# Patient Record
Sex: MALE | Race: BLACK OR AFRICAN AMERICAN | NOT HISPANIC OR LATINO | URBAN - METROPOLITAN AREA
[De-identification: names, ages, dates, MRNs, and addresses within clinical notes are randomized per-mention and may not be internally consistent; named-entity substitution may affect disease eponyms.]

---

## 2018-05-28 ENCOUNTER — EMERGENCY (EMERGENCY)
Facility: HOSPITAL | Age: 58
LOS: 1 days | End: 2018-05-28
Payer: MEDICAID

## 2018-05-28 PROCEDURE — 93970 EXTREMITY STUDY: CPT | Mod: 26

## 2018-05-28 PROCEDURE — 99285 EMERGENCY DEPT VISIT HI MDM: CPT

## 2018-05-28 PROCEDURE — 71046 X-RAY EXAM CHEST 2 VIEWS: CPT | Mod: 26

## 2019-05-20 ENCOUNTER — EMERGENCY (EMERGENCY)
Facility: HOSPITAL | Age: 59
LOS: 1 days | End: 2019-05-20
Admitting: EMERGENCY MEDICINE
Payer: MEDICAID

## 2019-05-20 PROCEDURE — 73630 X-RAY EXAM OF FOOT: CPT | Mod: 26,LT

## 2019-05-20 PROCEDURE — 99283 EMERGENCY DEPT VISIT LOW MDM: CPT

## 2019-08-16 PROCEDURE — 71045 X-RAY EXAM CHEST 1 VIEW: CPT | Mod: 26

## 2019-08-16 PROCEDURE — 99285 EMERGENCY DEPT VISIT HI MDM: CPT

## 2019-08-17 ENCOUNTER — OUTPATIENT (OUTPATIENT)
Dept: OUTPATIENT SERVICES | Facility: HOSPITAL | Age: 59
LOS: 1 days | End: 2019-08-17

## 2019-08-17 ENCOUNTER — INPATIENT (INPATIENT)
Facility: HOSPITAL | Age: 59
LOS: 2 days | Discharge: ANOTHER HOSPITAL | End: 2019-08-20
Payer: MEDICARE

## 2019-08-17 PROCEDURE — 93970 EXTREMITY STUDY: CPT | Mod: 26

## 2019-08-17 PROCEDURE — 99223 1ST HOSP IP/OBS HIGH 75: CPT

## 2019-08-17 PROCEDURE — 76856 US EXAM PELVIC COMPLETE: CPT | Mod: 26

## 2019-08-17 PROCEDURE — 76770 US EXAM ABDO BACK WALL COMP: CPT | Mod: 26

## 2019-08-18 ENCOUNTER — OUTPATIENT (OUTPATIENT)
Dept: OUTPATIENT SERVICES | Facility: HOSPITAL | Age: 59
LOS: 1 days | End: 2019-08-18

## 2019-08-18 PROCEDURE — 99232 SBSQ HOSP IP/OBS MODERATE 35: CPT

## 2019-08-18 PROCEDURE — G0365: CPT | Mod: 26

## 2019-08-18 PROCEDURE — 93306 TTE W/DOPPLER COMPLETE: CPT | Mod: 26

## 2019-08-19 ENCOUNTER — OUTPATIENT (OUTPATIENT)
Dept: OUTPATIENT SERVICES | Facility: HOSPITAL | Age: 59
LOS: 1 days | End: 2019-08-19

## 2019-08-19 PROCEDURE — 93010 ELECTROCARDIOGRAM REPORT: CPT

## 2019-08-19 PROCEDURE — 71045 X-RAY EXAM CHEST 1 VIEW: CPT | Mod: 26

## 2019-08-19 PROCEDURE — 99233 SBSQ HOSP IP/OBS HIGH 50: CPT

## 2019-08-20 ENCOUNTER — INPATIENT (INPATIENT)
Facility: HOSPITAL | Age: 59
LOS: 8 days | Discharge: HOME | DRG: 264 | End: 2019-08-29
Attending: THORACIC SURGERY (CARDIOTHORACIC VASCULAR SURGERY) | Admitting: THORACIC SURGERY (CARDIOTHORACIC VASCULAR SURGERY)
Payer: MEDICARE

## 2019-08-20 VITALS
OXYGEN SATURATION: 97 % | SYSTOLIC BLOOD PRESSURE: 161 MMHG | HEART RATE: 67 BPM | RESPIRATION RATE: 16 BRPM | DIASTOLIC BLOOD PRESSURE: 74 MMHG | HEIGHT: 65 IN | WEIGHT: 190.48 LBS

## 2019-08-20 DIAGNOSIS — E11.9 TYPE 2 DIABETES MELLITUS WITHOUT COMPLICATIONS: ICD-10-CM

## 2019-08-20 DIAGNOSIS — I25.10 ATHEROSCLEROTIC HEART DISEASE OF NATIVE CORONARY ARTERY WITHOUT ANGINA PECTORIS: ICD-10-CM

## 2019-08-20 DIAGNOSIS — E78.5 HYPERLIPIDEMIA, UNSPECIFIED: ICD-10-CM

## 2019-08-20 DIAGNOSIS — Z29.9 ENCOUNTER FOR PROPHYLACTIC MEASURES, UNSPECIFIED: ICD-10-CM

## 2019-08-20 DIAGNOSIS — I10 ESSENTIAL (PRIMARY) HYPERTENSION: ICD-10-CM

## 2019-08-20 DIAGNOSIS — N18.9 CHRONIC KIDNEY DISEASE, UNSPECIFIED: ICD-10-CM

## 2019-08-20 PROBLEM — Z00.00 ENCOUNTER FOR PREVENTIVE HEALTH EXAMINATION: Status: ACTIVE | Noted: 2019-08-20

## 2019-08-20 LAB
ABO RH CONFIRMATION: SIGNIFICANT CHANGE UP
ALBUMIN SERPL ELPH-MCNC: 3.1 G/DL — LOW (ref 3.3–5.2)
ALP SERPL-CCNC: 96 U/L — SIGNIFICANT CHANGE UP (ref 40–120)
ALT FLD-CCNC: 32 U/L — SIGNIFICANT CHANGE UP
ANION GAP SERPL CALC-SCNC: 16 MMOL/L — SIGNIFICANT CHANGE UP (ref 5–17)
APPEARANCE UR: CLEAR — SIGNIFICANT CHANGE UP
APTT BLD: 28.9 SEC — SIGNIFICANT CHANGE UP (ref 27.5–36.3)
APTT BLD: 32.3 SEC — SIGNIFICANT CHANGE UP (ref 27.5–36.3)
AST SERPL-CCNC: 21 U/L — SIGNIFICANT CHANGE UP
BACTERIA # UR AUTO: ABNORMAL
BASOPHILS # BLD AUTO: 0.06 K/UL — SIGNIFICANT CHANGE UP (ref 0–0.2)
BASOPHILS NFR BLD AUTO: 0.6 % — SIGNIFICANT CHANGE UP (ref 0–2)
BILIRUB SERPL-MCNC: 0.4 MG/DL — SIGNIFICANT CHANGE UP (ref 0.4–2)
BILIRUB UR-MCNC: NEGATIVE — SIGNIFICANT CHANGE UP
BLD GP AB SCN SERPL QL: SIGNIFICANT CHANGE UP
BUN SERPL-MCNC: 51 MG/DL — HIGH (ref 8–20)
CALCIUM SERPL-MCNC: 7.2 MG/DL — LOW (ref 8.6–10.2)
CHLORIDE SERPL-SCNC: 95 MMOL/L — LOW (ref 98–107)
CHOLEST SERPL-MCNC: 209 MG/DL — HIGH (ref 110–199)
CK MB CFR SERPL CALC: 3.2 NG/ML — SIGNIFICANT CHANGE UP (ref 0–6.7)
CK SERPL-CCNC: 190 U/L — SIGNIFICANT CHANGE UP (ref 30–200)
CO2 SERPL-SCNC: 25 MMOL/L — SIGNIFICANT CHANGE UP (ref 22–29)
COLOR SPEC: YELLOW — SIGNIFICANT CHANGE UP
CREAT ?TM UR-MCNC: 49 MG/DL — SIGNIFICANT CHANGE UP
CREAT SERPL-MCNC: 5.76 MG/DL — HIGH (ref 0.5–1.3)
DIFF PNL FLD: ABNORMAL
EOSINOPHIL # BLD AUTO: 0.19 K/UL — SIGNIFICANT CHANGE UP (ref 0–0.5)
EOSINOPHIL NFR BLD AUTO: 1.9 % — SIGNIFICANT CHANGE UP (ref 0–6)
EPI CELLS # UR: SIGNIFICANT CHANGE UP
FERRITIN SERPL-MCNC: 305 NG/ML — SIGNIFICANT CHANGE UP (ref 30–400)
GAS PNL BLDA: SIGNIFICANT CHANGE UP
GLUCOSE BLDC GLUCOMTR-MCNC: 112 MG/DL — HIGH (ref 70–99)
GLUCOSE BLDC GLUCOMTR-MCNC: 153 MG/DL — HIGH (ref 70–99)
GLUCOSE BLDC GLUCOMTR-MCNC: 160 MG/DL — HIGH (ref 70–99)
GLUCOSE SERPL-MCNC: 138 MG/DL — HIGH (ref 70–115)
GLUCOSE UR QL: NEGATIVE MG/DL — SIGNIFICANT CHANGE UP
HBA1C BLD-MCNC: 8.4 % — HIGH (ref 4–5.6)
HCT VFR BLD CALC: 39.7 % — SIGNIFICANT CHANGE UP (ref 39–50)
HCT VFR BLD CALC: 39.7 % — SIGNIFICANT CHANGE UP (ref 39–50)
HDLC SERPL-MCNC: 32 MG/DL — LOW
HGB BLD-MCNC: 12.4 G/DL — LOW (ref 13–17)
HGB BLD-MCNC: 12.7 G/DL — LOW (ref 13–17)
IMM GRANULOCYTES NFR BLD AUTO: 0.5 % — SIGNIFICANT CHANGE UP (ref 0–1.5)
INR BLD: 1.04 RATIO — SIGNIFICANT CHANGE UP (ref 0.88–1.16)
IRON SATN MFR SERPL: 18 % — SIGNIFICANT CHANGE UP (ref 16–55)
IRON SATN MFR SERPL: 37 UG/DL — LOW (ref 59–158)
KETONES UR-MCNC: NEGATIVE — SIGNIFICANT CHANGE UP
LEUKOCYTE ESTERASE UR-ACNC: NEGATIVE — SIGNIFICANT CHANGE UP
LIPID PNL WITH DIRECT LDL SERPL: 131 MG/DL — SIGNIFICANT CHANGE UP
LYMPHOCYTES # BLD AUTO: 2.66 K/UL — SIGNIFICANT CHANGE UP (ref 1–3.3)
LYMPHOCYTES # BLD AUTO: 26.2 % — SIGNIFICANT CHANGE UP (ref 13–44)
MAGNESIUM SERPL-MCNC: 1.7 MG/DL — SIGNIFICANT CHANGE UP (ref 1.6–2.6)
MCHC RBC-ENTMCNC: 25.8 PG — LOW (ref 27–34)
MCHC RBC-ENTMCNC: 26.3 PG — LOW (ref 27–34)
MCHC RBC-ENTMCNC: 31.2 GM/DL — LOW (ref 32–36)
MCHC RBC-ENTMCNC: 32 GM/DL — SIGNIFICANT CHANGE UP (ref 32–36)
MCV RBC AUTO: 82.2 FL — SIGNIFICANT CHANGE UP (ref 80–100)
MCV RBC AUTO: 82.5 FL — SIGNIFICANT CHANGE UP (ref 80–100)
MONOCYTES # BLD AUTO: 1.1 K/UL — HIGH (ref 0–0.9)
MONOCYTES NFR BLD AUTO: 10.8 % — SIGNIFICANT CHANGE UP (ref 2–14)
MRSA PCR RESULT.: SIGNIFICANT CHANGE UP
NEUTROPHILS # BLD AUTO: 6.1 K/UL — SIGNIFICANT CHANGE UP (ref 1.8–7.4)
NEUTROPHILS NFR BLD AUTO: 60 % — SIGNIFICANT CHANGE UP (ref 43–77)
NITRITE UR-MCNC: NEGATIVE — SIGNIFICANT CHANGE UP
NT-PROBNP SERPL-SCNC: HIGH PG/ML (ref 0–300)
PA ADP PRP-ACNC: 199 PRU — SIGNIFICANT CHANGE UP (ref 180–376)
PH UR: 7 — SIGNIFICANT CHANGE UP (ref 5–8)
PHOSPHATE SERPL-MCNC: 5.6 MG/DL — HIGH (ref 2.4–4.7)
PLATELET # BLD AUTO: 263 K/UL — SIGNIFICANT CHANGE UP (ref 150–400)
PLATELET # BLD AUTO: 263 K/UL — SIGNIFICANT CHANGE UP (ref 150–400)
POTASSIUM SERPL-MCNC: 4.5 MMOL/L — SIGNIFICANT CHANGE UP (ref 3.5–5.3)
POTASSIUM SERPL-SCNC: 4.5 MMOL/L — SIGNIFICANT CHANGE UP (ref 3.5–5.3)
PREALB SERPL-MCNC: 17 MG/DL — LOW (ref 18–38)
PROT ?TM UR-MCNC: 127 MG/DL — HIGH (ref 0–12)
PROT SERPL-MCNC: 7 G/DL — SIGNIFICANT CHANGE UP (ref 6.6–8.7)
PROT UR-MCNC: 100 MG/DL
PROT/CREAT UR-RTO: 2.6 RATIO — HIGH
PROTHROM AB SERPL-ACNC: 12 SEC — SIGNIFICANT CHANGE UP (ref 10–12.9)
RBC # BLD: 4.81 M/UL — SIGNIFICANT CHANGE UP (ref 4.2–5.8)
RBC # BLD: 4.83 M/UL — SIGNIFICANT CHANGE UP (ref 4.2–5.8)
RBC # FLD: 14.2 % — SIGNIFICANT CHANGE UP (ref 10.3–14.5)
RBC # FLD: 14.2 % — SIGNIFICANT CHANGE UP (ref 10.3–14.5)
RBC CASTS # UR COMP ASSIST: ABNORMAL /HPF (ref 0–4)
S AUREUS DNA NOSE QL NAA+PROBE: SIGNIFICANT CHANGE UP
SODIUM SERPL-SCNC: 136 MMOL/L — SIGNIFICANT CHANGE UP (ref 135–145)
SP GR SPEC: 1 — LOW (ref 1.01–1.02)
T4 FREE SERPL-MCNC: 1.3 NG/DL — SIGNIFICANT CHANGE UP (ref 0.9–1.8)
TIBC SERPL-MCNC: 204 UG/DL — LOW (ref 220–430)
TOTAL CHOLESTEROL/HDL RATIO MEASUREMENT: 7 RATIO — SIGNIFICANT CHANGE UP (ref 3.4–9.6)
TRANSFERRIN SERPL-MCNC: 143 MG/DL — LOW (ref 180–329)
TRIGL SERPL-MCNC: 229 MG/DL — HIGH (ref 10–200)
TROPONIN T SERPL-MCNC: 0.24 NG/ML — HIGH (ref 0–0.06)
TSH SERPL-MCNC: 1.97 UIU/ML — SIGNIFICANT CHANGE UP (ref 0.27–4.2)
UROBILINOGEN FLD QL: NEGATIVE MG/DL — SIGNIFICANT CHANGE UP
WBC # BLD: 10.16 K/UL — SIGNIFICANT CHANGE UP (ref 3.8–10.5)
WBC # BLD: 11.15 K/UL — HIGH (ref 3.8–10.5)
WBC # FLD AUTO: 10.16 K/UL — SIGNIFICANT CHANGE UP (ref 3.8–10.5)
WBC # FLD AUTO: 11.15 K/UL — HIGH (ref 3.8–10.5)
WBC UR QL: SIGNIFICANT CHANGE UP

## 2019-08-20 PROCEDURE — 71045 X-RAY EXAM CHEST 1 VIEW: CPT | Mod: 26

## 2019-08-20 PROCEDURE — 36620 INSERTION CATHETER ARTERY: CPT

## 2019-08-20 PROCEDURE — 90937 HEMODIALYSIS REPEATED EVAL: CPT

## 2019-08-20 PROCEDURE — 93880 EXTRACRANIAL BILAT STUDY: CPT | Mod: 26

## 2019-08-20 PROCEDURE — 76770 US EXAM ABDO BACK WALL COMP: CPT | Mod: 26

## 2019-08-20 PROCEDURE — 93010 ELECTROCARDIOGRAM REPORT: CPT | Mod: 77

## 2019-08-20 PROCEDURE — 93460 R&L HRT ART/VENTRICLE ANGIO: CPT | Mod: 26

## 2019-08-20 PROCEDURE — 93306 TTE W/DOPPLER COMPLETE: CPT | Mod: 26

## 2019-08-20 PROCEDURE — G0365: CPT | Mod: 26

## 2019-08-20 PROCEDURE — 99223 1ST HOSP IP/OBS HIGH 75: CPT | Mod: 25

## 2019-08-20 PROCEDURE — 93010 ELECTROCARDIOGRAM REPORT: CPT

## 2019-08-20 RX ORDER — ASPIRIN/CALCIUM CARB/MAGNESIUM 324 MG
81 TABLET ORAL DAILY
Refills: 0 | Status: DISCONTINUED | OUTPATIENT
Start: 2019-08-20 | End: 2019-08-29

## 2019-08-20 RX ORDER — ATORVASTATIN CALCIUM 80 MG/1
40 TABLET, FILM COATED ORAL AT BEDTIME
Refills: 0 | Status: DISCONTINUED | OUTPATIENT
Start: 2019-08-20 | End: 2019-08-29

## 2019-08-20 RX ORDER — IPRATROPIUM/ALBUTEROL SULFATE 18-103MCG
3 AEROSOL WITH ADAPTER (GRAM) INHALATION ONCE
Refills: 0 | Status: DISCONTINUED | OUTPATIENT
Start: 2019-08-20 | End: 2019-08-29

## 2019-08-20 RX ORDER — METOPROLOL TARTRATE 50 MG
12.5 TABLET ORAL
Refills: 0 | Status: DISCONTINUED | OUTPATIENT
Start: 2019-08-20 | End: 2019-08-20

## 2019-08-20 RX ORDER — SODIUM CHLORIDE 9 MG/ML
3 INJECTION INTRAMUSCULAR; INTRAVENOUS; SUBCUTANEOUS EVERY 8 HOURS
Refills: 0 | Status: DISCONTINUED | OUTPATIENT
Start: 2019-08-20 | End: 2019-08-29

## 2019-08-20 RX ORDER — METOPROLOL TARTRATE 50 MG
25 TABLET ORAL
Refills: 0 | Status: DISCONTINUED | OUTPATIENT
Start: 2019-08-20 | End: 2019-08-20

## 2019-08-20 RX ORDER — INSULIN HUMAN 100 [IU]/ML
INJECTION, SOLUTION SUBCUTANEOUS
Refills: 0 | Status: DISCONTINUED | OUTPATIENT
Start: 2019-08-20 | End: 2019-08-21

## 2019-08-20 RX ORDER — SEVELAMER CARBONATE 2400 MG/1
800 POWDER, FOR SUSPENSION ORAL
Refills: 0 | Status: DISCONTINUED | OUTPATIENT
Start: 2019-08-20 | End: 2019-08-29

## 2019-08-20 RX ORDER — HEPARIN SODIUM 5000 [USP'U]/ML
INJECTION INTRAVENOUS; SUBCUTANEOUS
Qty: 25000 | Refills: 0 | Status: DISCONTINUED | OUTPATIENT
Start: 2019-08-20 | End: 2019-08-20

## 2019-08-20 RX ORDER — HEPARIN SODIUM 5000 [USP'U]/ML
INJECTION INTRAVENOUS; SUBCUTANEOUS
Qty: 25000 | Refills: 0 | Status: DISCONTINUED | OUTPATIENT
Start: 2019-08-20 | End: 2019-08-23

## 2019-08-20 RX ADMIN — HEPARIN SODIUM 1000 UNIT(S)/HR: 5000 INJECTION INTRAVENOUS; SUBCUTANEOUS at 15:43

## 2019-08-20 RX ADMIN — HEPARIN SODIUM 1250 UNIT(S)/HR: 5000 INJECTION INTRAVENOUS; SUBCUTANEOUS at 23:51

## 2019-08-20 RX ADMIN — SODIUM CHLORIDE 3 MILLILITER(S): 9 INJECTION INTRAMUSCULAR; INTRAVENOUS; SUBCUTANEOUS at 21:28

## 2019-08-20 RX ADMIN — SEVELAMER CARBONATE 800 MILLIGRAM(S): 2400 POWDER, FOR SUSPENSION ORAL at 17:47

## 2019-08-20 RX ADMIN — SODIUM CHLORIDE 3 MILLILITER(S): 9 INJECTION INTRAMUSCULAR; INTRAVENOUS; SUBCUTANEOUS at 17:45

## 2019-08-20 RX ADMIN — ATORVASTATIN CALCIUM 40 MILLIGRAM(S): 80 TABLET, FILM COATED ORAL at 23:51

## 2019-08-20 NOTE — H&P ADULT - PROBLEM SELECTOR PLAN 1
8[place]20: [place] [facility] [place]  -Pre-surgical workup and diagnostic testing in progress  -Strict bedrest til 1PM (LHC via Rt groin today)  -Cont ASA  -Cont Lopressor   -Cont Lipitor  -Cont with supportive ICU care as needed  Above plan of care to be discussed with CT surgical team on AM rounds    continue post-op GI, DVT, abx prophylaxis 8/20: Transfer from [facility] [place]G workup  -Pre-surgical workup and diagnostic testing in progress  -Heparin gtt (Titrate to therapeutic PTT)  -Strict bedrest til 1PM (LHC via Rt groin today)  -Cont ASA  -Cont Lopressor   -Cont Lipitor  -Cont with supportive ICU care as needed  Above plan of care to be discussed with CT surgical team on AM rounds    continue post-op GI, DVT, abx prophylaxis

## 2019-08-20 NOTE — H&P ADULT - PROBLEM SELECTOR PLAN 2
Renal consult for planned HD  -Cont and maintain Rt HD PermCath  -Monitor strict I&O's, replace lytes PRN  -Hold nephrotoxic medications Renal consult for planned HD  -Cont and maintain Rt TDC HD PermCath  -Monitor strict I&O's, replace lytes PRN  -Hold nephrotoxic medications

## 2019-08-20 NOTE — PROGRESS NOTE ADULT - SUBJECTIVE AND OBJECTIVE BOX
Patient tolerated hemodialysis of 2hr 30 mins,     Patient was seen and evaluated on dialysis.   Patient is tolerating the procedure well, @ Onset,    T(C): 37.2 (08-21-19 @ 06:15), Max: 37.2 (08-21-19 @ 06:15)  HR: 69 (08-21-19 @ 06:00) (52 - 83)  BP: 153/73 (08-21-19 @ 06:00) (98/57 - 171/70)  Continue dialysis: in AM,   Dialyzer: Revaclear 300      QB:  300 ml.,       QD: 500ml.,  Goal UF 0.5 L  over 2.5  Hours ,    Patient became hypotensive during treatment.     Treatment ended.     No s/s or c/o pain observed at this time.     Patient maintains clear and patent access to right tunneled catheter .     Report given to primary nurse,     patient safety and comfort maintained.  D/W Tita ( SENTHIL )     Patient left in stable condition.    HD In AM,

## 2019-08-20 NOTE — PROGRESS NOTE ADULT - SUBJECTIVE AND OBJECTIVE BOX
DX : Stage - 2 HTN w. Hypertensive, Emergency , Acute CHF,     YUSUF - Baseline CKD - Stage ? - Poor F.U  ( > ESRD )    DM - 2, Old CVA,    ACS Excluded, S/P LHC  + CAD , Low EF,     Access : Non Tunneled Dialysis Catheter , ( R - IJ )    CABG Planned, Will Optimize ,    D/W Tita ( CICU )

## 2019-08-20 NOTE — H&P ADULT - HISTORY OF PRESENT ILLNESS
60y/o M with PMHx of DM2, HTN, HLD, prior CVA (Poor historian-Unclear of time, states no sensory/motor deficits), sciatica (Intermit Lt lower ext numbness), history of poor Rx medication compliance presented to [facility] [place] [facility] ED on 8/17/19 with sudden onset of SOB with concurrent chest discomfort while performing yard work. 60y/o M with PMHx of DM2, HTN, HLD, prior CVA (Poor historian-Unclear of time, states no sensory/motor deficits), sciatica (Intermit Lt lower ext numbness), history of medication non-compliance presents as a transfer from [facility] [place] for CTS evaluation for possible CABG, under care of Dr. Mims. Pt presented to [facility] [place] ED on 8/17/19 with sudden onset of SOB with concurrent chest discomfort while performing yard work. Reports this is the first occurrence of these symptoms and without identifying provoking/relieving events prior. In [facility] [place] ED pt found to be HTN-'s, 's (Treated with Labetalol, hydralazine, and procardia), with  signs of end organ damage: Creatinine 4.5 (Baseline 2018 Cr: 2.2), elevated transaminase (ALT:70, AST:95), Trop 0.123, BNP 22,000. CXR: increased pulm vascular congestion, ECHO: EF 35% with LVH, Mild/Mod MR, mild TR, mild AI, EKG: +ST/T-wave abnormality suggesting inferior & Anterolateral ischemia, Lower ext doppler: NEG DVT, Renal US consistent with renal disease (Pt has no known Hx of renal disease). 8/19 Continuing elevation in creatine (4.9-5.8) requiring HD (8/19 Rt IJ Permacath). 8/20: Cardiac cath (Held for Cr level optimization): +MVD showing dLAD 90%, D1 80%, pCXF 70%, mCXF 70%, OM1 80%, mRCA, 95%, RPDLS 70%.   Pt emergently transferred via helicopter to [facility] and admitted to [facility] CTICU. On admission pt is hemodynamically stable: HR 60's NSR, 's, sat 97% RA. Preop workup started and surgical interventions explained in detail and educated on risk/benefits. Pt currently in NAD and without acute complaints. Denies N/V/D HA, dizziness, blurry vision, numbness/tingling, SOB, cough, chest pain, palpitations, abd pain or urinary sx's. 58y/o M, active smoker, with PMHx of DM2, HTN, HLD, prior CVA (Poor historian-Unclear of time, Reports no sensory/motor deficits), sciatica (Intermit Lt lower ext numbness), history of medication non-compliance presents as a [place] [facility] [place]TS evaluation for possible CABG, under care of Dr. Mims. Pt presented to [facility] ED on 8/17/19 with sudden onset of SOB with concurrent chest discomfort while performing yard work. Reports this is the first occurrence of these symptoms and without identifiable provoking/relieving factors. In [facility] ED pt found to be HTN-'s, 's (Treated with Labetalol, hydralazine, and procardia), with  signs of end organ damage: Creatinine 4.5 (Baseline 2018 Cr: 2.2), elevated transaminase (ALT:70, AST:95), Trop 0.123, BNP 22,000. CXR: + increased pulm vascular congestion, ECHO: EF 35% with LVH, Mild/Mod MR, mild TR, mild AI, EKG: +ST/T-wave abnormality suggesting inferior & Anterolateral ischemia, Lower ext doppler: NEG DVT, Renal US consistent with renal disease (Pt has no known Hx of renal disease). 8/19 Continued Cr elevation (4.9-5.8) requiring HD (8/19 Rt IJ Permacath). 8/20: Cardiac cath (Held for Cr level optimization): +MVD showing dLAD 90%, D1 80%, pCXF 70%, mCXF 70%, OM1 80%, mRCA, 95%, RPDLS 70%.   Pt emergently transferred via helicopter to [facility] and admitted to [facility] CTICU. On admission pt is hemodynamically stable: HR 60's NSR, 's, sat 97% RA. Preop workup started and surgical interventions explained in detail and educated on risk/benefits. Pt currently in NAD and without acute complaints. Denies N/V/D HA, dizziness, blurry vision, numbness/tingling, SOB, cough, chest pain, palpitations, abd pain or urinary sx's. 58y/o M, active smoker, with PMHx of DM2, HTN, HLD, prior CVA (Poor historian-Unclear of time, Reports no sensory/motor deficits), sciatica (Intermit Lt lower ext numbness), history of medication non-compliance presents as a [place] [facility] [place] for possible CABG, under care of Dr. Mims. Pt presented to [facility] ED on 8/17/19 with sudden onset of SOB with concurrent chest discomfort while performing yard work. Reports this is the first occurrence of these symptoms and without identifiable provoking/relieving factors. In [facility] ED pt found to be HTN-'s, 's (Treated with Labetalol, hydralazine, and procardia), with  signs of end organ damage: Creatinine 4.5 (Baseline 2018 Cr: 2.2), elevated transaminase (ALT:70, AST:95), Trop 0.123, BNP 22,000. CXR: + increased pulm vascular congestion, ECHO: EF 35% with LVH, Mild/Mod MR, mild TR, mild AI, EKG: +ST/T-wave abnormality suggesting inferior & Anterolateral ischemia, Lower ext doppler: NEG DVT, Renal US consistent with renal disease (Pt has no known Hx of renal disease). 8/19 Continued Cr elevation (4.9-5.8) requiring HD (8/19 Rt TDC Permacath). 8/20: Cardiac cath (Held for Cr level optimization): +MVD showing dLAD 90%, D1 80%, pCXF 70%, mCXF 70%, OM1 80%, mRCA, 95%, RPDLS 70%.   Pt emergently transferred via helicopter to [facility] and admitted to [facility] CTICU. On admission pt is hemodynamically stable: HR 60's NSR, 's, sat 97% RA. Preop workup started and surgical interventions explained in detail and educated on risk/benefits. Pt currently in NAD and without acute complaints. Denies N/V/D HA, dizziness, blurry vision, numbness/tingling, SOB, cough, chest pain, palpitations, abd pain or urinary sx's.

## 2019-08-20 NOTE — H&P ADULT - NSICDXPASTMEDICALHX_GEN_ALL_CORE_FT
PAST MEDICAL HISTORY:  Cerebrovascular accident (CVA) Poor historian, unaware of date    DM2 (diabetes mellitus, type 2)     HLD (hyperlipidemia)     HTN (hypertension)

## 2019-08-20 NOTE — H&P ADULT - ASSESSMENT
60y/o M, active smoker, with PMHx of DM2, HTN, HLD, prior CVA (Poor historian-Unclear of time, Reports no sensory/motor deficits), sciatica (Intermit Lt lower ext numbness), history of medication non-compliance [place] [facility] [place] possible [place]G, under care of Dr. Mims.  [place]17[place] [facility] [place]/o sudden onset of SOB with concurrent chest discomfort while performing yard work.   ED workup: HTN-'s, 's (Treated with Labetalol, hydralazine, and procardia), with  signs of end organ damage: Creatinine 4.5 (Baseline 2018 Cr: 2.2), elevated transaminase (ALT:70, AST:95), Trop 0.123, BNP 22,000. CXR: + increased pulm vascular congestion, ECHO: EF 35% with LVH, Mild/Mod MR, mild TR, mild AI, EKG: +ST/T-wave abnormality suggesting inferior & Anterolateral ischemia, Lower ext doppler: NEG DVT, Renal US consistent with renal disease (Pt has no known Hx of renal disease).   8/19 Continued Cr elevation (4.9-5.8) requiring HD (8/19 Rt IJ Permacath).   8/20: Cardiac cath (Held for Cr level optimization): +MVD showing dLAD 90%, D1 80%, pCXF 70%, mCXF 70%, OM1 80%, mRCA, 95%, RPDLS 70%.     Pt emergently transferred via helicopter to [facility] and admitted to [facility] CTICU. On admission pt is hemodynamically stable: HR 60's NSR, 's, sat 97% RA. Preop workup started. 60y/o M, active smoker, with PMHx of DM2, HTN, HLD, prior CVA (Poor historian-Unclear of time, Reports no sensory/motor deficits), sciatica (Intermit Lt lower ext numbness), history of medication non-compliance presents as a [place] [facility] [place] for possible CABG, under care of Dr. Mims.  8/17: [facility] ED c/o sudden onset of SOB with concurrent chest discomfort while performing yard work.   ED workup: HTN-'s, 's (Treated with Labetalol, hydralazine, and procardia), with  signs of end organ damage: Creatinine 4.5 (Baseline 2018 Cr: 2.2), elevated transaminase (ALT:70, AST:95), Trop 0.123, BNP 22,000. CXR: + increased pulm vascular congestion, ECHO: EF 35% with LVH, Mild/Mod MR, mild TR, mild AI, EKG: +ST/T-wave abnormality suggesting inferior & Anterolateral ischemia, Lower ext doppler: NEG DVT, Renal US consistent with renal disease (Pt has no known Hx of renal disease).   8/19 Continued Cr elevation (4.9-5.8) requiring HD (8/19 Rt TDC Permacath).   8/20: Cardiac cath (Held for Cr level optimization): +MVD showing dLAD 90%, D1 80%, pCXF 70%, mCXF 70%, OM1 80%, mRCA, 95%, RPDLS 70%.     Pt emergently transferred via helicopter to [facility] and admitted to [facility] CTICU. On admission pt is hemodynamically stable: HR 60's NSR, 's, sat 97% RA. Preop workup started.

## 2019-08-20 NOTE — CHART NOTE - NSCHARTNOTEFT_GEN_A_CORE
notified by RN at change of shift that pt was unresponsive. pt was on HD and started to slump over in bed and stare off into space. RN asked if he was ok and pt responded yes, but then appeared to have LOC.  Upon arrival of PA to bedside, HR noted to be 40s, pt slowly respondeding to sternal rub. initally thought to have had neurologic event, however, unable to obtain BP by cuff. fluid bolus via HD circuit and pt HR and BP improved as did mental status. pt also noted to be diaphoretic and clammy and reported dizziness. SBP 110s on cuff. pt alert and oriented, no neuro deficit noted once BP improved. HR SR 60s. emergent ashanti placed L radial for BP monitoring, stat labs, and abg.  HD aborted. Dr. Mims made aware. will continue ot monitor.

## 2019-08-20 NOTE — H&P ADULT - NSHPSOCIALHISTORY_GEN_ALL_CORE
Currently on disability, living with friend in single-story home requiring 1 step to enter. Performs ADL without assistance or limitations.  Smoking: Admits to daily smoking, Avg 1PPD  Drinking: Denies  Illicit Drugs, Denies  Assisted Devices: NONE

## 2019-08-20 NOTE — H&P ADULT - NSHPPHYSICALEXAM_GEN_ALL_CORE
General: WN/WD NAD  Neurology: A&Ox3, nonfocal, ESTRADA x 4  Neck: Supple, No JVD, Trachea midline.   Respiratory: CTA B/L. No wheezing, rales or rhonchi noted b/l.  CV: RRR, S1S2, no murmurs, rubs or gallops  Abdominal: Soft, NT, ND +BS, Last BM  Extremities: No edema, + peripheral pulses upper/lower ext b/l  Access: Rt subclavian PermCath [place] [facility] [place] per documentation) General: WN/WD NAD  Neurology: A&Ox3, nonfocal, ESTRADA x 4  Neck: Supple, No JVD, Trachea midline.   Respiratory: CTA B/L. No wheezing, rales or rhonchi noted b/l.  CV: RRR, S1S2, no murmurs, rubs or gallops  Abdominal: Soft, NT, ND +BS, Last BM  Extremities: No edema, + peripheral pulses upper/lower ext b/l  Access: Rt subclavian PermCath from [facility] (8/19 per documentation)  Groins: Rt LHC access site-D/I without hematoma.

## 2019-08-20 NOTE — H&P ADULT - NSHPLABSRESULTS_GEN_ALL_CORE
12.4   10.16 )-----------( 263      ( 20 Aug 2019 13:09 )             39.7     08-20    136  |  95<L>  |  51.0<H>  ----------------------------<  138<H>  4.5   |  25.0  |  5.76<H>    Ca    7.2<L>      20 Aug 2019 13:09  Phos  5.6     08-20  Mg     1.7     08-20    TPro  7.0  /  Alb  3.1<L>  /  TBili  0.4  /  DBili  x   /  AST  21  /  ALT  32  /  AlkPhos  96  08-20    *Addional labs pending

## 2019-08-21 LAB
24R-OH-CALCIDIOL SERPL-MCNC: 11.3 NG/ML — LOW (ref 30–80)
ALT FLD-CCNC: 22 U/L — SIGNIFICANT CHANGE UP
ANION GAP SERPL CALC-SCNC: 16 MMOL/L — SIGNIFICANT CHANGE UP (ref 5–17)
APTT BLD: 37.2 SEC — HIGH (ref 27.5–36.3)
APTT BLD: 50.5 SEC — HIGH (ref 27.5–36.3)
APTT BLD: 52.7 SEC — HIGH (ref 27.5–36.3)
BUN SERPL-MCNC: 38 MG/DL — HIGH (ref 8–20)
CALCIUM SERPL-MCNC: 6.9 MG/DL — LOW (ref 8.4–10.5)
CALCIUM SERPL-MCNC: 7.2 MG/DL — LOW (ref 8.6–10.2)
CHLORIDE SERPL-SCNC: 98 MMOL/L — SIGNIFICANT CHANGE UP (ref 98–107)
CK SERPL-CCNC: 138 U/L — SIGNIFICANT CHANGE UP (ref 30–200)
CO2 SERPL-SCNC: 22 MMOL/L — SIGNIFICANT CHANGE UP (ref 22–29)
CREAT SERPL-MCNC: 4.66 MG/DL — HIGH (ref 0.5–1.3)
CULTURE RESULTS: SIGNIFICANT CHANGE UP
GLUCOSE BLDC GLUCOMTR-MCNC: 119 MG/DL — HIGH (ref 70–99)
GLUCOSE BLDC GLUCOMTR-MCNC: 129 MG/DL — HIGH (ref 70–99)
GLUCOSE BLDC GLUCOMTR-MCNC: 137 MG/DL — HIGH (ref 70–99)
GLUCOSE BLDC GLUCOMTR-MCNC: 205 MG/DL — HIGH (ref 70–99)
GLUCOSE SERPL-MCNC: 144 MG/DL — HIGH (ref 70–115)
HCT VFR BLD CALC: 38.8 % — LOW (ref 39–50)
HCV AB S/CO SERPL IA: 0.12 S/CO — SIGNIFICANT CHANGE UP (ref 0–0.99)
HCV AB SERPL-IMP: SIGNIFICANT CHANGE UP
HGB BLD-MCNC: 12.1 G/DL — LOW (ref 13–17)
MAGNESIUM SERPL-MCNC: 1.8 MG/DL — SIGNIFICANT CHANGE UP (ref 1.6–2.6)
MCHC RBC-ENTMCNC: 25.9 PG — LOW (ref 27–34)
MCHC RBC-ENTMCNC: 31.2 GM/DL — LOW (ref 32–36)
MCV RBC AUTO: 82.9 FL — SIGNIFICANT CHANGE UP (ref 80–100)
PLATELET # BLD AUTO: 265 K/UL — SIGNIFICANT CHANGE UP (ref 150–400)
POTASSIUM SERPL-MCNC: 4.2 MMOL/L — SIGNIFICANT CHANGE UP (ref 3.5–5.3)
POTASSIUM SERPL-SCNC: 4.2 MMOL/L — SIGNIFICANT CHANGE UP (ref 3.5–5.3)
PTH-INTACT FLD-MCNC: 456 PG/ML — HIGH (ref 15–65)
RBC # BLD: 4.68 M/UL — SIGNIFICANT CHANGE UP (ref 4.2–5.8)
RBC # FLD: 14.3 % — SIGNIFICANT CHANGE UP (ref 10.3–14.5)
SODIUM SERPL-SCNC: 136 MMOL/L — SIGNIFICANT CHANGE UP (ref 135–145)
SPECIMEN SOURCE: SIGNIFICANT CHANGE UP
TROPONIN T SERPL-MCNC: 0.22 NG/ML — HIGH (ref 0–0.06)
WBC # BLD: 11.04 K/UL — HIGH (ref 3.8–10.5)
WBC # FLD AUTO: 11.04 K/UL — HIGH (ref 3.8–10.5)

## 2019-08-21 PROCEDURE — 71045 X-RAY EXAM CHEST 1 VIEW: CPT | Mod: 26

## 2019-08-21 PROCEDURE — 93010 ELECTROCARDIOGRAM REPORT: CPT | Mod: 59

## 2019-08-21 PROCEDURE — 99232 SBSQ HOSP IP/OBS MODERATE 35: CPT

## 2019-08-21 PROCEDURE — 90937 HEMODIALYSIS REPEATED EVAL: CPT

## 2019-08-21 PROCEDURE — 99222 1ST HOSP IP/OBS MODERATE 55: CPT

## 2019-08-21 RX ORDER — METOPROLOL TARTRATE 50 MG
25 TABLET ORAL EVERY 12 HOURS
Refills: 0 | Status: DISCONTINUED | OUTPATIENT
Start: 2019-08-22 | End: 2019-08-29

## 2019-08-21 RX ORDER — METOPROLOL TARTRATE 50 MG
25 TABLET ORAL ONCE
Refills: 0 | Status: COMPLETED | OUTPATIENT
Start: 2019-08-21 | End: 2019-08-21

## 2019-08-21 RX ORDER — INSULIN LISPRO 100/ML
VIAL (ML) SUBCUTANEOUS
Refills: 0 | Status: DISCONTINUED | OUTPATIENT
Start: 2019-08-21 | End: 2019-08-29

## 2019-08-21 RX ORDER — TUBERCULIN PURIFIED PROTEIN DERIVATIVE 5 [IU]/.1ML
5 INJECTION, SOLUTION INTRADERMAL ONCE
Refills: 0 | Status: COMPLETED | OUTPATIENT
Start: 2019-08-21 | End: 2019-08-21

## 2019-08-21 RX ADMIN — SEVELAMER CARBONATE 800 MILLIGRAM(S): 2400 POWDER, FOR SUSPENSION ORAL at 08:01

## 2019-08-21 RX ADMIN — Medication 25 MILLIGRAM(S): at 22:21

## 2019-08-21 RX ADMIN — SEVELAMER CARBONATE 800 MILLIGRAM(S): 2400 POWDER, FOR SUSPENSION ORAL at 14:38

## 2019-08-21 RX ADMIN — TUBERCULIN PURIFIED PROTEIN DERIVATIVE 5 UNIT(S): 5 INJECTION, SOLUTION INTRADERMAL at 12:00

## 2019-08-21 RX ADMIN — HEPARIN SODIUM 1500 UNIT(S)/HR: 5000 INJECTION INTRAVENOUS; SUBCUTANEOUS at 14:01

## 2019-08-21 RX ADMIN — Medication 2: at 16:20

## 2019-08-21 RX ADMIN — HEPARIN SODIUM 1700 UNIT(S)/HR: 5000 INJECTION INTRAVENOUS; SUBCUTANEOUS at 21:33

## 2019-08-21 RX ADMIN — SODIUM CHLORIDE 3 MILLILITER(S): 9 INJECTION INTRAMUSCULAR; INTRAVENOUS; SUBCUTANEOUS at 21:48

## 2019-08-21 RX ADMIN — SEVELAMER CARBONATE 800 MILLIGRAM(S): 2400 POWDER, FOR SUSPENSION ORAL at 16:21

## 2019-08-21 RX ADMIN — SODIUM CHLORIDE 3 MILLILITER(S): 9 INJECTION INTRAMUSCULAR; INTRAVENOUS; SUBCUTANEOUS at 05:53

## 2019-08-21 RX ADMIN — HEPARIN SODIUM 1500 UNIT(S)/HR: 5000 INJECTION INTRAVENOUS; SUBCUTANEOUS at 07:10

## 2019-08-21 RX ADMIN — SODIUM CHLORIDE 3 MILLILITER(S): 9 INJECTION INTRAMUSCULAR; INTRAVENOUS; SUBCUTANEOUS at 14:40

## 2019-08-21 RX ADMIN — Medication 81 MILLIGRAM(S): at 14:38

## 2019-08-21 RX ADMIN — ATORVASTATIN CALCIUM 40 MILLIGRAM(S): 80 TABLET, FILM COATED ORAL at 21:34

## 2019-08-21 NOTE — PROGRESS NOTE ADULT - ASSESSMENT
60y/o M, active smoker, with PMHx of DM2, HTN, HLD, prior CVA (Poor historian-Unclear of time, Reports no sensory/motor deficits), sciatica (Intermit Lt lower ext numbness), history of medication non-compliance presents as a [place] [facility] [place] for possible CABG, under care of Dr. Mims.  8/17: [facility] [place]/o sudden onset of SOB with concurrent chest discomfort while performing yard work.   ED workup: HTN-'s, 's (Treated with Labetalol, hydralazine, and procardia), with  signs of end organ damage: Creatinine 4.5 (Baseline 2018 Cr: 2.2), elevated transaminase (ALT:70, AST:95), Trop 0.123, BNP 22,000. CXR: + increased pulm vascular congestion, ECHO: EF 35% with LVH, Mild/Mod MR, mild TR, mild AI, EKG: +ST/T-wave abnormality suggesting inferior & Anterolateral ischemia, Lower ext doppler: NEG DVT, Renal US consistent with renal disease (Pt has no known Hx of renal disease).   8/19 Continued Cr elevation (4.9-5.8) requiring HD (8/19 Rt TDC Permacath).   8/20: Cardiac cath (Held for Cr level optimization): +MVD showing dLAD 90%, D1 80%, pCXF 70%, mCXF 70%, OM1 80%, mRCA, 95%, RPDLS 70%.     Pt emergently transferred via helicopter to [facility] and admitted to [facility] CTICU. On admission pt is hemodynamically stable: HR 60's NSR, 's, sat 97% RA. Preop workup started.    8/20 During dialysis session 2.5 hours in pt became obtunded unable to obtain BP off cuff. Volume bolus given and dialysis stopped with improvement in mental status and hemodynamics. Pt is in CTICU stabble at this time.

## 2019-08-21 NOTE — CONSULT NOTE ADULT - ATTENDING COMMENTS
ARISE presrved for AVF creation    Vein diameters adequate    Will proceed with LUE AVF creation on 8/16/19

## 2019-08-21 NOTE — PROGRESS NOTE ADULT - PROBLEM SELECTOR PLAN 1
8/20: Transfer from [facility] [place] for CABG workup  -Pre-surgical workup and diagnostic testing in progress  -Heparin gtt (Titrate to therapeutic PTT)  -Strict bedrest til 1PM (LHC via Rt groin today)  -Cont ASA  -lopressor held jorge recent hypotension during dialysis    -Cont Lipitor  -Cont with supportive ICU care as needed  Above plan of care to be discussed with CT surgical team on AM rounds    continue post-op GI, DVT, abx prophylaxis

## 2019-08-21 NOTE — PROGRESS NOTE ADULT - SUBJECTIVE AND OBJECTIVE BOX
Subjective: Pt resting in bed comfortably. No acute events overnight. Pt denies any heacdache, syncope, chest pain, palpitations, SOB, difficulty breathing, nausea, vomiting, fevers, chills, abdominal discomfort, urinary retention.     T(C): 36.6 (08-20-19 @ 19:21), Max: 36.9 (08-20-19 @ 14:00)  HR: 77 (08-21-19 @ 02:00) (52 - 83)  BP: 138/69 (08-21-19 @ 02:00) (98/57 - 171/70)  ABP: 142/42 (08-21-19 @ 02:00) (31/29 - 156/55)  ABP(mean): 94 (08-21-19 @ 02:00) (30 - 111)  RR: 16 (08-21-19 @ 02:00) (12 - 37)  SpO2: 95% (08-21-19 @ 02:00) (88% - 99%)  Wt(kg): --  CVP(mm Hg): --  CO: --  CI: --  PA: --       I&O's Detail    20 Aug 2019 07:01  -  21 Aug 2019 03:24  --------------------------------------------------------  IN:    heparin  Infusion.: 87.5 mL    Oral Fluid: 240 mL  Total IN: 327.5 mL    OUT:    Other: 300 mL    Voided: 900 mL  Total OUT: 1200 mL    Total NET: -872.5 mL          LABS: All Lab data reviewed and analyzed                        12.1 11.04 )-----------( 265      ( 21 Aug 2019 02:48 )             38.8     08-20    136  |  95<L>  |  51.0<H>  ----------------------------<  138<H>  4.5   |  25.0  |  5.76<H>    Ca    7.2<L>      20 Aug 2019 13:09  Phos  5.6     08-20  Mg     1.7     08-20    TPro  7.0  /  Alb  3.1<L>  /  TBili  0.4  /  DBili  x   /  AST  21  /  ALT  32  /  AlkPhos  96  08-20    PT/INR - ( 20 Aug 2019 13:09 )   PT: 12.0 sec;   INR: 1.04 ratio         PTT - ( 20 Aug 2019 22:33 )  PTT:32.3 sec  CARDIAC MARKERS ( 20 Aug 2019 18:55 )   U/L / CKMB3.2 ng/mL / Troponin T0.24 ng/mL /      ABG - ( 20 Aug 2019 19:33 )  pH, Arterial: 7.46  pH, Blood: x     /  pCO2: 38    /  pO2: 90    / HCO3: 27    / Base Excess: 3.3   /  SaO2: 97                CAPILLARY BLOOD GLUCOSE      POCT Blood Glucose.: 153 mg/dL (20 Aug 2019 22:20)  POCT Blood Glucose.: 160 mg/dL (20 Aug 2019 19:12)  POCT Blood Glucose.: 112 mg/dL (20 Aug 2019 16:35)           RADIOLOGY: - Reviewed and analyzed   CXR: pending    HOSPITAL MEDICATIONS: All medications reviewed and analyzed  MEDICATIONS  (STANDING):  aspirin enteric coated 81 milliGRAM(s) Oral daily  atorvastatin 40 milliGRAM(s) Oral at bedtime  heparin  Infusion.  Unit(s)/Hr (10 mL/Hr) IV Continuous <Continuous>  insulin regular  human corrective regimen sliding scale   SubCutaneous Before meals and at bedtime  sevelamer carbonate 800 milliGRAM(s) Oral three times a day with meals  sodium chloride 0.9% lock flush 3 milliLiter(s) IV Push every 8 hours    MEDICATIONS  (PRN):  ALBUTerol/ipratropium for Nebulization. 3 milliLiter(s) Nebulizer once PRN Shortness of Breath          Lines: right IJ permacath and left radial ashanti       Neuro: A+O x 3, non-focal, speech clear and intact  HEENT: PERRL, EOMI, oral mucosa pink and moist  Neck: supple, no JVD  CV: regular rate, regular rhythm, +S1S2, no murmurs or rub  Pulm/chest: lung sounds CTA and equal bilaterally, no accessory muscle use noted  Abd: soft, NT, ND, +BS  Ext: ESTRADA x 4, no C/C/E  Skin: warm, well perfused         Case including assessment/plan of care discussed with   CT surgery attending   Care time:   35   minutes of noncontinuos critical care time spent evaluating/treating, reviewing imaging, labs, discussing case with multidisciplinary team, discussing plans/goals of care with patient/family to prevent further life threatening depreciation of the patient's condition. Non-inclusive is procedure time.       59yMale with PMH         PAST MEDICAL & SURGICAL HISTORY:  HLD (hyperlipidemia)  Cerebrovascular accident (CVA): Poor historian, unaware of date  DM2 (diabetes mellitus, type 2)  HTN (hypertension)  No significant past surgical history

## 2019-08-21 NOTE — PROGRESS NOTE ADULT - ASSESSMENT
1) ESRD on HD  2) MBD of renal dx  3) Anemia of renal dx  4) Vol HTN    Plan for tunneled catheter;  Spoke with vascular surgery  Evenutal CABG  HD today    d/w CTS

## 2019-08-21 NOTE — PROGRESS NOTE ADULT - SUBJECTIVE AND OBJECTIVE BOX
[facility] DIVISION OF KIDNEY DISEASES AND HYPERTENSION -- FOLLOW UP NOTE  --------------------------------------------------------------------------------  Chief Complaint: ESRD HD    24 hour events/subjective:  Seen/examined  On HD today      PAST HISTORY  --------------------------------------------------------------------------------  No significant changes to PMH, PSH, FHx, SHx, unless otherwise noted    ALLERGIES & MEDICATIONS  --------------------------------------------------------------------------------  Allergies    lactose (Flatulence (Severe); Diarrhea (Severe); Stomach Upset (Severe); Nausea (Severe))  No Known Drug Allergies    Intolerances      Standing Inpatient Medications  aspirin enteric coated 81 milliGRAM(s) Oral daily  atorvastatin 40 milliGRAM(s) Oral at bedtime  heparin  Infusion.  Unit(s)/Hr IV Continuous <Continuous>  insulin regular  human corrective regimen sliding scale   SubCutaneous Before meals and at bedtime  sevelamer carbonate 800 milliGRAM(s) Oral three times a day with meals  sodium chloride 0.9% lock flush 3 milliLiter(s) IV Push every 8 hours    PRN Inpatient Medications  ALBUTerol/ipratropium for Nebulization. 3 milliLiter(s) Nebulizer once PRN      REVIEW OF SYSTEMS  --------------------------------------------------------------------------------  Gen: No weight changes, fatigue, fevers/chills, weakness  Skin: No rashes  Head/Eyes/Ears/Mouth: No headache; Normal hearing; Normal vision w/o blurriness; No sinus pain/discomfort, sore throat  Respiratory: No dyspnea, cough, wheezing, hemoptysis  CV: No chest pain, PND, orthopnea  GI: No abdominal pain, diarrhea, constipation, nausea, vomiting, melena, hematochezia  : No increased frequency, dysuria, hematuria, nocturia  MSK: No joint pain/swelling; no back pain; no edema  Neuro: No dizziness/lightheadedness, weakness, seizures, numbness, tingling  Heme: No easy bruising or bleeding  Endo: No heat/cold intolerance  Psych: No significant nervousness, anxiety, stress, depression    All other systems were reviewed and are negative, except as noted.    VITALS/PHYSICAL EXAM  --------------------------------------------------------------------------------  T(C): 36.9 (08-21-19 @ 14:29), Max: 37.2 (08-21-19 @ 06:15)  HR: 84 (08-21-19 @ 14:29) (52 - 85)  BP: 173/70 (08-21-19 @ 14:29) (98/57 - 176/85)  RR: 17 (08-21-19 @ 14:29) (15 - 37)  SpO2: 98% (08-21-19 @ 14:29) (88% - 100%)  Wt(kg): --  Height (cm): 162.6 (08-20-19 @ 12:00)  Weight (kg): 86.4 (08-20-19 @ 12:00)  BMI (kg/m2): 32.7 (08-20-19 @ 12:00)  BSA (m2): 1.92 (08-20-19 @ 12:00)      08-20-19 @ 07:01  -  08-21-19 @ 07:00  --------------------------------------------------------  IN: 327.5 mL / OUT: 1200 mL / NET: -872.5 mL    08-21-19 @ 07:01  -  08-21-19 @ 15:18  --------------------------------------------------------  IN: 465 mL / OUT: 700 mL / NET: -235 mL      Physical Exam:  	Gen: NAD, well-appearing  	HEENT: PERRL, supple neck, clear oropharynx  	Pulm: CTA B/L  	CV: RRR, S1S2; no rub  	Back: No spinal or CVA tenderness; no sacral edema  	Abd: +BS, soft, nontender/nondistended  	: No suprapubic tenderness  	UE: Warm, FROM, no clubbing, intact strength; no edema; no asterixis  	LE: Warm, FROM, no clubbing, intact strength; no edema  	Neuro: No focal deficits, intact gait  	Psych: Normal affect and mood  	Skin: Warm, without rashes  	Vascular access:    LABS/STUDIES  --------------------------------------------------------------------------------              12.1   11.04 >-----------<  265      [08-21-19 @ 02:48]              38.8     136  |  98  |  38.0  ----------------------------<  144      [08-21-19 @ 02:48]  4.2   |  22.0  |  4.66        Ca     7.2     [08-21-19 @ 02:48]      Mg     1.8     [08-21-19 @ 02:48]      Phos  5.6     [08-20-19 @ 13:09]    TPro  x   /  Alb  x   /  TBili  x   /  DBili  x   /  AST  x   /  ALT  22  /  AlkPhos  x   [08-21-19 @ 13:27]    PT/INR: PT 12.0 , INR 1.04       [08-20-19 @ 13:09]  PTT: 52.7       [08-21-19 @ 13:28]    Troponin 0.22      [08-21-19 @ 02:48]        [08-21-19 @ 02:48]    Creatinine Trend:  SCr 4.66 [08-21 @ 02:48]  SCr 5.76 [08-20 @ 13:09]    Urinalysis - [08-20-19 @ 13:32]      Color Yellow / Appearance Clear / SG 1.005 / pH 7.0      Gluc Negative / Ketone Negative  / Bili Negative / Urobili Negative       Blood Small / Protein 100 / Leuk Est Negative / Nitrite Negative      RBC 3-5 / WBC 0-2 / Hyaline  / Gran  / Sq Epi  / Non Sq Epi Occasional / Bacteria Occasional    Urine Creatinine 49      [08-20-19 @ 15:44]  Urine Protein 127.0      [08-20-19 @ 15:44]    Iron 37, TIBC 204, %sat 18      [08-20-19 @ 15:20]  Ferritin 305      [08-20-19 @ 15:20]  PTH -- (Ca 6.9)      [08-20-19 @ 23:33]   456  Vitamin D (25OH) 11.3      [08-21-19 @ 00:00]  HbA1c 8.4      [08-20-19 @ 13:09]  TSH 1.97      [08-20-19 @ 13:09]  Lipid: chol 209, , HDL 32,       [08-20-19 @ 13:09]

## 2019-08-22 DIAGNOSIS — N17.9 ACUTE KIDNEY FAILURE, UNSPECIFIED: ICD-10-CM

## 2019-08-22 DIAGNOSIS — I50.41 ACUTE COMBINED SYSTOLIC (CONGESTIVE) AND DIASTOLIC (CONGESTIVE) HEART FAILURE: ICD-10-CM

## 2019-08-22 DIAGNOSIS — J44.9 CHRONIC OBSTRUCTIVE PULMONARY DISEASE, UNSPECIFIED: ICD-10-CM

## 2019-08-22 LAB
ALDOLASE SERPL-CCNC: 6.4 U/L — SIGNIFICANT CHANGE UP (ref 3.3–10.3)
ANION GAP SERPL CALC-SCNC: 14 MMOL/L — SIGNIFICANT CHANGE UP (ref 5–17)
APTT BLD: 60.7 SEC — HIGH (ref 27.5–36.3)
APTT BLD: 72.2 SEC — HIGH (ref 27.5–36.3)
BUN SERPL-MCNC: 33 MG/DL — HIGH (ref 8–20)
CALCIUM SERPL-MCNC: 7.1 MG/DL — LOW (ref 8.6–10.2)
CHLORIDE SERPL-SCNC: 98 MMOL/L — SIGNIFICANT CHANGE UP (ref 98–107)
CK SERPL-CCNC: 109 U/L — SIGNIFICANT CHANGE UP (ref 30–200)
CO2 SERPL-SCNC: 24 MMOL/L — SIGNIFICANT CHANGE UP (ref 22–29)
CREAT SERPL-MCNC: 4.24 MG/DL — HIGH (ref 0.5–1.3)
GLUCOSE BLDC GLUCOMTR-MCNC: 125 MG/DL — HIGH (ref 70–99)
GLUCOSE BLDC GLUCOMTR-MCNC: 144 MG/DL — HIGH (ref 70–99)
GLUCOSE BLDC GLUCOMTR-MCNC: 151 MG/DL — HIGH (ref 70–99)
GLUCOSE BLDC GLUCOMTR-MCNC: 229 MG/DL — HIGH (ref 70–99)
GLUCOSE SERPL-MCNC: 153 MG/DL — HIGH (ref 70–115)
HAV IGM SER-ACNC: SIGNIFICANT CHANGE UP
HBV CORE AB SER-ACNC: SIGNIFICANT CHANGE UP
HBV CORE IGM SER-ACNC: SIGNIFICANT CHANGE UP
HBV SURFACE AB SER-ACNC: <3 MIU/ML — LOW
HBV SURFACE AG SER-ACNC: SIGNIFICANT CHANGE UP
HCT VFR BLD CALC: 38.3 % — LOW (ref 39–50)
HGB BLD-MCNC: 12 G/DL — LOW (ref 13–17)
MAGNESIUM SERPL-MCNC: 1.8 MG/DL — SIGNIFICANT CHANGE UP (ref 1.6–2.6)
MCHC RBC-ENTMCNC: 25.8 PG — LOW (ref 27–34)
MCHC RBC-ENTMCNC: 31.3 GM/DL — LOW (ref 32–36)
MCV RBC AUTO: 82.4 FL — SIGNIFICANT CHANGE UP (ref 80–100)
PLATELET # BLD AUTO: 245 K/UL — SIGNIFICANT CHANGE UP (ref 150–400)
POTASSIUM SERPL-MCNC: 4 MMOL/L — SIGNIFICANT CHANGE UP (ref 3.5–5.3)
POTASSIUM SERPL-SCNC: 4 MMOL/L — SIGNIFICANT CHANGE UP (ref 3.5–5.3)
RBC # BLD: 4.65 M/UL — SIGNIFICANT CHANGE UP (ref 4.2–5.8)
RBC # FLD: 14.2 % — SIGNIFICANT CHANGE UP (ref 10.3–14.5)
SODIUM SERPL-SCNC: 136 MMOL/L — SIGNIFICANT CHANGE UP (ref 135–145)
TROPONIN T SERPL-MCNC: 0.2 NG/ML — HIGH (ref 0–0.06)
WBC # BLD: 12.78 K/UL — HIGH (ref 3.8–10.5)
WBC # FLD AUTO: 12.78 K/UL — HIGH (ref 3.8–10.5)

## 2019-08-22 PROCEDURE — 99232 SBSQ HOSP IP/OBS MODERATE 35: CPT

## 2019-08-22 PROCEDURE — 99223 1ST HOSP IP/OBS HIGH 75: CPT

## 2019-08-22 PROCEDURE — 71045 X-RAY EXAM CHEST 1 VIEW: CPT | Mod: 26

## 2019-08-22 PROCEDURE — 93010 ELECTROCARDIOGRAM REPORT: CPT

## 2019-08-22 PROCEDURE — 99233 SBSQ HOSP IP/OBS HIGH 50: CPT

## 2019-08-22 RX ORDER — SODIUM CHLORIDE 9 MG/ML
1000 INJECTION, SOLUTION INTRAVENOUS
Refills: 0 | Status: DISCONTINUED | OUTPATIENT
Start: 2019-08-22 | End: 2019-08-29

## 2019-08-22 RX ORDER — BUDESONIDE AND FORMOTEROL FUMARATE DIHYDRATE 160; 4.5 UG/1; UG/1
2 AEROSOL RESPIRATORY (INHALATION)
Refills: 0 | Status: DISCONTINUED | OUTPATIENT
Start: 2019-08-22 | End: 2019-08-29

## 2019-08-22 RX ORDER — DEXTROSE 50 % IN WATER 50 %
25 SYRINGE (ML) INTRAVENOUS ONCE
Refills: 0 | Status: DISCONTINUED | OUTPATIENT
Start: 2019-08-22 | End: 2019-08-29

## 2019-08-22 RX ORDER — GLUCAGON INJECTION, SOLUTION 0.5 MG/.1ML
1 INJECTION, SOLUTION SUBCUTANEOUS ONCE
Refills: 0 | Status: DISCONTINUED | OUTPATIENT
Start: 2019-08-22 | End: 2019-08-29

## 2019-08-22 RX ORDER — DEXTROSE 50 % IN WATER 50 %
12.5 SYRINGE (ML) INTRAVENOUS ONCE
Refills: 0 | Status: DISCONTINUED | OUTPATIENT
Start: 2019-08-22 | End: 2019-08-29

## 2019-08-22 RX ORDER — INSULIN LISPRO 100/ML
2 VIAL (ML) SUBCUTANEOUS
Refills: 0 | Status: DISCONTINUED | OUTPATIENT
Start: 2019-08-22 | End: 2019-08-29

## 2019-08-22 RX ORDER — DEXTROSE 50 % IN WATER 50 %
15 SYRINGE (ML) INTRAVENOUS ONCE
Refills: 0 | Status: DISCONTINUED | OUTPATIENT
Start: 2019-08-22 | End: 2019-08-29

## 2019-08-22 RX ORDER — HYDRALAZINE HCL 50 MG
10 TABLET ORAL ONCE
Refills: 0 | Status: COMPLETED | OUTPATIENT
Start: 2019-08-22 | End: 2019-08-22

## 2019-08-22 RX ADMIN — SEVELAMER CARBONATE 800 MILLIGRAM(S): 2400 POWDER, FOR SUSPENSION ORAL at 08:30

## 2019-08-22 RX ADMIN — SODIUM CHLORIDE 3 MILLILITER(S): 9 INJECTION INTRAMUSCULAR; INTRAVENOUS; SUBCUTANEOUS at 21:49

## 2019-08-22 RX ADMIN — HEPARIN SODIUM 1700 UNIT(S)/HR: 5000 INJECTION INTRAVENOUS; SUBCUTANEOUS at 04:20

## 2019-08-22 RX ADMIN — SODIUM CHLORIDE 3 MILLILITER(S): 9 INJECTION INTRAMUSCULAR; INTRAVENOUS; SUBCUTANEOUS at 05:44

## 2019-08-22 RX ADMIN — Medication 2: at 11:18

## 2019-08-22 RX ADMIN — Medication 25 MILLIGRAM(S): at 18:20

## 2019-08-22 RX ADMIN — BUDESONIDE AND FORMOTEROL FUMARATE DIHYDRATE 2 PUFF(S): 160; 4.5 AEROSOL RESPIRATORY (INHALATION) at 20:52

## 2019-08-22 RX ADMIN — Medication 81 MILLIGRAM(S): at 11:18

## 2019-08-22 RX ADMIN — Medication 10 MILLIGRAM(S): at 16:12

## 2019-08-22 RX ADMIN — HEPARIN SODIUM 1700 UNIT(S)/HR: 5000 INJECTION INTRAVENOUS; SUBCUTANEOUS at 16:34

## 2019-08-22 RX ADMIN — SEVELAMER CARBONATE 800 MILLIGRAM(S): 2400 POWDER, FOR SUSPENSION ORAL at 16:35

## 2019-08-22 RX ADMIN — BUDESONIDE AND FORMOTEROL FUMARATE DIHYDRATE 2 PUFF(S): 160; 4.5 AEROSOL RESPIRATORY (INHALATION) at 10:20

## 2019-08-22 RX ADMIN — ATORVASTATIN CALCIUM 40 MILLIGRAM(S): 80 TABLET, FILM COATED ORAL at 21:53

## 2019-08-22 RX ADMIN — SEVELAMER CARBONATE 800 MILLIGRAM(S): 2400 POWDER, FOR SUSPENSION ORAL at 11:19

## 2019-08-22 RX ADMIN — SODIUM CHLORIDE 3 MILLILITER(S): 9 INJECTION INTRAMUSCULAR; INTRAVENOUS; SUBCUTANEOUS at 14:09

## 2019-08-22 NOTE — DIETITIAN INITIAL EVALUATION ADULT. - PROBLEM SELECTOR PLAN 2
Renal consult for planned HD  -Cont and maintain Rt TDC HD PermCath  -Monitor strict I&O's, replace lytes PRN  -Hold nephrotoxic medications

## 2019-08-22 NOTE — DIETITIAN INITIAL EVALUATION ADULT. - PROBLEM SELECTOR PLAN 1
8/20: Transfer from [facility] for CABG workup  -Pre-surgical workup and diagnostic testing in progress  -Heparin gtt (Titrate to therapeutic PTT)  -Strict bedrest til 1PM (LHC via Rt groin today)  -Cont ASA  -Cont Lopressor   -Cont Lipitor  -Cont with supportive ICU care as needed  Above plan of care to be discussed with CT surgical team on AM rounds    continue post-op GI, DVT, abx prophylaxis

## 2019-08-22 NOTE — PROGRESS NOTE ADULT - SUBJECTIVE AND OBJECTIVE BOX
ICU Vital Signs Last 24 Hrs,    T(C): 37.2 (22 Aug 2019 14:00), Max: 37.4 (22 Aug 2019 10:00)  T(F): 99 (22 Aug 2019 14:00), Max: 99.4 (22 Aug 2019 10:00)  HR: 64 (22 Aug 2019 15:00) (62 - 84)  BP: 176/79 (22 Aug 2019 15:00) (131/63 - 178/82)  BP(mean): 114 (22 Aug 2019 15:00) (87 - 118)  RR: 21 (22 Aug 2019 15:00) (15 - 26)  SpO2: 96% (22 Aug 2019 15:00) (94% - 100%)    136    |  98     |  33.0<H>  ----------------------------<  153<H>  Ca:7.1<L> (22 Aug 2019 03:53)  4.0     |  24.0   |  4.24<H>    TPro  x      /  Alb  x      /  TBili  x      /  DBili  x      /  AST  x      /  ALT  22     /  AlkPhos  x      21 Aug 2019 13:27                               12.0<L>  12.78<H> )-----------( 245      ( 22 Aug 2019 03:53 )                    38.3<L>    Phos:-- M.8 mg/dL PTH:-- Uric acid:-- Serum Osm:--  Ferritin:-- Iron:-- TIBC:-- Tsat:--  B12:-- TSH:-- ( @ 03:53)    Urinalysis Basic - ( 20 Aug 2019 13:32 )  Color: Yellow / Appearance: Clear / S.005<L> / pH: x  Gluc: x / Ketone: Negative  / Bili: Negative / Urobili: Negative mg/dL   Blood: x / Protein: 100 mg/dL<!> / Nitrite: Negative   Leuk Esterase: Negative / RBC: 3-5 /HPF<!> / WBC 0-2   Sq Epi: x / Non Sq Epi: Occasional / Bacteria: Occasional<!>    UProt:-- UCr:49 mg/dL P/C Ratio:2.6 Ratio<H> 24 hour Prot:-- UVol:-- CrCl:--  Isma:-- UOsm:-- UVol:-- UCl:-- [place]:-- ( @ 15:44)    Chief Complaint: ESRD HD    24 hour events/subjective:  Seen/examined    PAST HISTORY  --------------------------------------------------------------------------------  No significant changes to PMH, PSH, FHx, SHx, unless otherwise noted    ALLERGIES & MEDICATIONS  --------------------------------------------------------------------------------  Allergies    lactose (Flatulence (Severe); Diarrhea (Severe); Stomach Upset (Severe); Nausea (Severe))  No Known Drug Allergies    Standing Inpatient Medications  aspirin enteric coated 81 milliGRAM(s) Oral daily  atorvastatin 40 milliGRAM(s) Oral at bedtime  heparin  Infusion.  Unit(s)/Hr IV Continuous <Continuous>  insulin regular  human corrective regimen sliding scale   SubCutaneous Before meals and at bedtime  sevelamer carbonate 800 milliGRAM(s) Oral three times a day with meals  sodium chloride 0.9% lock flush 3 milliLiter(s) IV Push every 8 hours    PRN Inpatient Medications  ALBUTerol/ipratropium for Nebulization. 3 milliLiter(s) Nebulizer once PRN    REVIEW OF SYSTEMS  --------------------------------------------------------------------------------  Gen: No weight changes, fatigue, fevers/chills, weakness  Skin: No rashes  Head/Eyes/Ears/Mouth: No headache; Normal hearing; Normal vision w/o blurriness; No sinus pain/discomfort, sore throat  Respiratory: No dyspnea, cough, wheezing, hemoptysis  CV: No chest pain, PND, orthopnea  GI: No abdominal pain, diarrhea, constipation, nausea, vomiting, melena, hematochezia  : No increased frequency, dysuria, hematuria, nocturia  MSK: No joint pain/swelling; no back pain; no edema  Neuro: No dizziness/lightheadedness, weakness, seizures, numbness, tingling  Heme: No easy bruising or bleeding  Endo: No heat/cold intolerance  Psych: No significant nervousness, anxiety, stress, depression    All other systems were reviewed and are negative, except as noted.    VITALS/PHYSICAL EXAM  --------------------------------------------------------------------------------  T(C): 36.9 (19 @ 14:29), Max: 37.2 (19 @ 06:15)  HR: 84 (19 @ 14:29) (52 - 85)  BP: 173/70 (19 @ 14:29) (98/57 - 176/85)  RR: 17 (19 14:29) (15 - 37)  SpO2: 98% (19 14:29) (88% - 100%)  Height (cm): 162.6 (19 @ 12:00)  Weight (kg): 86.4 (19 @ 12:00)  BMI (kg/m2): 32.7 (19 @ 12:00)  BSA (m2): 1.92 (19 @ 12:00)    19 @ 07:01  -  19 @ 07:00  --------------------------------------------------------  IN: 327.5 mL / OUT: 1200 mL / NET: -872.5 mL    19 @ 07:01  -  19 @ 15:18  --------------------------------------------------------  IN: 465 mL / OUT: 700 mL / NET: -235 mL    Physical Exam:  	Gen: NAD, well-appearing  	HEENT: PERRL, supple neck, clear oropharynx  	Pulm: CTA B/L  	CV: RRR, S1S2; no rub  	Back: No spinal or CVA tenderness; no sacral edema  	Abd: +BS, soft, nontender/nondistended  	: No suprapubic tenderness  	UE: Warm, FROM, no clubbing, intact strength; no edema; no asterixis  	LE: Warm, FROM, no clubbing, intact strength; no edema  	Neuro: No focal deficits, intact gait  	Psych: Normal affect and mood  	Skin: Warm, without rashes  	Vascular access:    LABS/STUDIES  --------------------------------------------------------------------------------              12.1   11.04 >-----------<  265      [19 @ 02:48]              38.8     136  |  98  |  38.0  ----------------------------<  144      [19 @ 02:48]  4.2   |  22.0  |  4.66        Ca     7.2     [19 @ 02:48]      Mg     1.8     [19 @ 02:48]      Phos  5.6     [19 @ 13:09]    TPro  x   /  Alb  x   /  TBili  x   /  DBili  x   /  AST  x   /  ALT  22  /  AlkPhos  x   [19 @ 13:27]    PT/INR: PT 12.0 , INR 1.04       [19 @ 13:09]  PTT: 52.7       [19 13:28]    Troponin 0.22      [19 @ 02:48]        [19 02:48]    Creatinine Trend:  SCr 4.66 [ 02:48]  SCr 5.76 [ 13:09]    Urinalysis - [19 @ 13:32]      Color Yellow / Appearance Clear / SG 1.005 / pH 7.0      Gluc Negative / Ketone Negative  / Bili Negative / Urobili Negative       Blood Small / Protein 100 / Leuk Est Negative / Nitrite Negative      RBC 3-5 / WBC 0-2 / Hyaline  / Gran  / Sq Epi  / Non Sq Epi Occasional / Bacteria Occasional    Urine Creatinine 49      [19 @ 15:44]  Urine Protein 127.0      [19 @ 15:44]    Iron 37, TIBC 204, %sat 18      [19 @ 15:20]  Ferritin 305      [19 @ 15:20]  PTH -- (Ca 6.9)      [19 @ 23:33]   456  Vitamin D (25OH) 11.3      [19 @ 00:00]  HbA1c 8.4      [19 @ 13:09]  TSH 1.97      [19 @ 13:09]  Lipid: chol 209, , HDL 32,       [19 @ 13:09]    1) ESRD on HD  2) MBD of renal dx  3) Anemia of renal dx  4) Vol HTN    S/P tunneled  Dialysis catheter;  Eventual CABG  HD in AM,

## 2019-08-22 NOTE — PROGRESS NOTE ADULT - SUBJECTIVE AND OBJECTIVE BOX
Brief Hospital Course:   : presented to [facility] w/ c/o sudden onset of SOB with concurrent chest discomfort while performing yard work. Admitted with HTN emergency, acute on CKD. + NSTEMI   transferred from [facility] after p/w Subsequent LHC revealed multivessel CAD (dLAD 90%, D1 80%, pCXF 70%, mCXF 70%, OM1 80%, mRCA, 95%, RPDLS 70%) for which pt will need CABG. During HD at [facility], pt noted to be obtunded, found to be hypotensive, IVF resuscitation given, BP improved, and mental status returned to baseline.   dialysis for clearance only, no fluid removed. Vascular surgery consulted (R chest wall permacath may need to be removed d/t non-occlusive thrombus seen, preserve LUE for eventual AVF).      Subjective: "tired". Denies CP, SOB, N/V, fever, chills, abd pain, palpitations, dizziness, weakness, other c/o.  Review of Systems negative x 10 systems except as noted above      Patient is a 59y old  Male who presents with a chief complaint of CAD-Preop CABG (21 Aug 2019 15:18)    HPI:  60y/o M, active smoker, with PMHx of DM2, HTN, HLD, prior CVA (Poor historian-Unclear of time, Reports no sensory/motor deficits), sciatica (Intermit Lt lower ext numbness), history of medication non-compliance presents as a transfer from [facility] for CTS evaluation for possible CABG, under care of Dr. Mims. Pt presented to [facility] ED on 19 with sudden onset of SOB with concurrent chest discomfort while performing yard work. Reports this is the first occurrence of these symptoms and without identifiable provoking/relieving factors. In [facility] ED pt found to be HTN-'s, 's (Treated with Labetalol, hydralazine, and procardia), with  signs of end organ damage: Creatinine 4.5 (Baseline 2018 Cr: 2.2), elevated transaminase (ALT:70, AST:95), Trop 0.123, BNP 22,000. CXR: + increased pulm vascular congestion, ECHO: EF 35% with LVH, Mild/Mod MR, mild TR, mild AI, EKG: +ST/T-wave abnormality suggesting inferior & Anterolateral ischemia, Lower ext doppler: NEG DVT, Renal US consistent with renal disease (Pt has no known Hx of renal disease).  Continued Cr elevation (4.9-5.8) requiring HD ( Rt TDC Permacath). : Cardiac cath (Held for Cr level optimization): +MVD showing dLAD 90%, D1 80%, pCXF 70%, mCXF 70%, OM1 80%, mRCA, 95%, RPDLS 70%.   Pt emergently transferred via helicopter to [facility] and admitted to [facility] CTICU. On admission pt is hemodynamically stable: HR 60's NSR, 's, sat 97% RA. Preop workup started and surgical interventions explained in detail and educated on risk/benefits. Pt currently in NAD and without acute complaints. Denies N/V/D HA, dizziness, blurry vision, numbness/tingling, SOB, cough, chest pain, palpitations, abd pain or urinary sx's. (20 Aug 2019 12:12)    PAST MEDICAL & SURGICAL HISTORY:  HLD (hyperlipidemia)  Cerebrovascular accident (CVA): Poor historian, unaware of date  DM2 (diabetes mellitus, type 2)  HTN (hypertension)  No significant past surgical history    FAMILY HISTORY:  FH: HTN (hypertension)  FH: CAD (coronary artery disease)      Vitals   ICU Vital Signs Last 24 Hrs  T(C): 37.3 (22 Aug 2019 02:00), Max: 37.3 (21 Aug 2019 22:00)  T(F): 99.1 (22 Aug 2019 02:00), Max: 99.2 (21 Aug 2019 22:00)  HR: 62 (22 Aug 2019 05:00) (62 - 85), NSR with PACs  BP: 140/62 (22 Aug 2019 05:00) (131/63 - 176/85)  BP(mean): 89 (22 Aug 2019 05:00) (87 - 122)  ABP: 168/168 (21 Aug 2019 07:00) (168/168 - 234/234)  ABP(mean): 168 (21 Aug 2019 07:00) (168 - 234)  RR: 15 (22 Aug 2019 05:00) (15 - 20)  SpO2: 95% (22 Aug 2019 05:00) (94% - 100%)      I&O's Detail    20 Aug 2019 07:01  -  21 Aug 2019 07:00  --------------------------------------------------------  Total IN: 327.5 mL  Total OUT: 1200 mL  Total NET: -872.5 mL    21 Aug 2019 07:01  -  22 Aug 2019 05:31  --------------------------------------------------------  IN:    heparin  Infusion.: 346 mL    Oral Fluid: 720 mL  Total IN: 1066 mL    OUT:    Voided: 1200 mL  Total OUT: 1200 mL    Total NET: -134 mL      LABS                        12.0   12.78 )-----------( 245      ( 22 Aug 2019 03:53 )             38.3         136  |  98  |  33.0<H>  ----------------------------<  153<H>  4.0   |  24.0  |  4.24<H>    Ca    7.1<L>      22 Aug 2019 03:53  Mg     1.8         Phos  5.6         TPro  x   /  Alb  x   /  TBili  x   /  DBili  x   /  AST  x   /  ALT  22  /  AlkPhos  x       PT/INR - ( 20 Aug 2019 13:09 )   PT: 12.0 sec;   INR: 1.04 ratio      PTT - ( 22 Aug 2019 03:52 )  PTT:72.2 sec    CARDIAC MARKERS ( 22 Aug 2019 03:53 )   U/L / CKMBx     / Troponin T0.20 ng/mL /    Blood Gas Profile - Arterial (19 @ 19:33)    pH, Arterial: 7.46    pCO2, Arterial: 38 mmHg    pO2, Arterial: 90 mmHg    HCO3, Arterial: 27 mmoL/L    Base Excess, Arterial: 3.3 mmol/L    Oxygen Saturation, Arterial: 97 %    FIO2, Arterial: .50    Blood Gas Comments Arterial: 50% venti mask    Blood Gas Source Arterial: Arterial    Blood Gas Arterial, Lactate (19 @ 19:33)    Blood Gas Arterial, Lactate: 1.2 mmoL/L    Urinalysis Basic - ( 20 Aug 2019 13:32 )  Color: Yellow / Appearance: Clear / S.005 / pH: x  Gluc: x / Ketone: Negative  / Bili: Negative / Urobili: Negative mg/dL   Blood: x / Protein: 100 mg/dL / Nitrite: Negative   Leuk Esterase: Negative / RBC: 3-5 /HPF / WBC 0-2   Sq Epi: x / Non Sq Epi: Occasional / Bacteria: Occasional    POCT Blood Glucose.: 129 mg/dL (19 @ 21:38)  POCT Blood Glucose.: 205 mg/dL (19 @ 15:57)  POCT Blood Glucose.: 119 mg/dL (19 @ 13:25)  POCT Blood Glucose.: 137 mg/dL (19 @ 07:30)      < from: Xray Chest 1 View- PORTABLE-Routine (19 @ 06:04) >  FINDINGS:  The airway is midline. Continued application of the double-lumen right IJ line.  There are no airspace consolidations.  There is no pleural effusion or pneumothorax.   The cardiac silhouette is normal size.   The bones are normal.   IMPRESSION: No acute cardiopulmonary findings.  < end of copied text >    < from: US Vessel Mapping Hemodialysis (19 @ 23:37) >  IMPRESSION:    Nonocclusive thrombus seen around the dialysis catheter in the right IJ vein.    No DVT in the upper arms bilaterally.  Basilic and brachial veins are widely patent and have superficial courses bilaterally.  < end of copied text >    < from: US Kidney and Bladder (19 @ 23:24) >  IMPRESSION:   No hydronephrosis.  Small amount of debris in the urinary bladder; correlation is recommended with a urinalysis.  Incidental note of a small right pleural effusion.  2 cm hypoechoic focus at the lateral aspect of the upper pole of the right kidney, possibly related to perinephric fat, however an exophytic renal lesion cannot be excluded; a nonemergent noncontrast MRI of the abdomen is recommended for further evaluation.  < end of copied text >    < from: US Duplex Carotid Arteries Complete, Bilateral (19 @ 23:24) >  IMPRESSION: 50-69% stenosis in the mid left internal carotid artery.  < end of copied text >      < from: TTE Echo Complete w/Doppler (19 @ 14:55) >  PHYSICIAN INTERPRETATION:  Left Ventricle: Endocardial visualization was enhanced with intravenous echo contrast. The left ventricular internal cavity size is normal. Global LV systolic function was moderately decreased. Left ventricular ejection fraction, by visual estimation, is 35 to 40%. Spectral Doppler shows impaired relaxation pattern of left ventricular myocardial filling (Grade I diastolic dysfunction).  Right Ventricle: Normal right ventricular size and function. The right ventricular size is normal. RV systolic function is normal.  Left Atrium: Normal left atrial size.  Right Atrium: Normal right atrial size.  Pericardium: There is no evidence of pericardial effusion.  Mitral Valve: Mild thickening of the anterior and posterior mitral valve leaflets. Mild mitral valve regurgitation is seen.  Tricuspid Valve: Trivial tricuspid regurgitation is visualized. Adequate TR velocity was not obtained to accurately assess RVSP.  Aortic Valve: Sclerotic aortic valve with normal opening. Mild aortic valve regurgitation is seen.  Pulmonic Valve: Structurally normal pulmonic valve, with normal leaflet excursion. Trace pulmonic valve regurgitation.  Aorta: The aortic root is normal in size and structure.  Pulmonary Artery: The main pulmonary artery is normal in size.  Venous: The inferior vena cava was normal sized, with respiratory size variation greater than 50%.  Summary:   1. Left ventricular ejection fraction, by visual estimation, is 35 to 40%.   2. Moderately decreased global left ventricular systolic function.   3. Spectral Doppler shows impaired relaxation pattern of left ventricular myocardial filling (Grade I diastolic dysfunction).   4. There is severe concentric left ventricular hypertrophy.   5. Normal right ventricular size and function.   6. There is no evidence of pericardial effusion.   7. Mild mitral valve regurgitation.   8. Mild thickening of the anterior and posterior mitral valve leaflets.   9. Mild aortic regurgitation.  10. Sclerotic aortic valve with normal opening.  11. Endocardial visualization was enhanced with intravenous echo contrast.  < end of copied text >    < from: 12 Lead ECG (19 @ 05:33) >  Ventricular Rate 71 BPM  Atrial Rate 71 BPM  P-R Interval 168 ms  QRS Duration 110 ms  Q-T Interval 450 ms  QTC Calculation(Bezet) 489 ms  P Axis 67 degrees  R Axis -31 degrees  T Axis 227 degrees  Diagnosis Line Sinus rhythm withoccasional Premature ventricular complexes  Left axis deviation  Septal infarct , age undetermined  ST & T wave abnormality, consider lateral ischemia  Abnormal ECG  < end of copied text >      MEDICATIONS  (STANDING):  aspirin enteric coated 81 milliGRAM(s) Oral daily  atorvastatin 40 milliGRAM(s) Oral at bedtime  heparin  Infusion.  Unit(s)/Hr (10 mL/Hr) IV Continuous   insulin lispro (HumaLOG) corrective regimen sliding scale   SubCutaneous Before meals and at bedtime  metoprolol tartrate 25 milliGRAM(s) Oral every 12 hours  sevelamer carbonate 800 milliGRAM(s) Oral three times a day with meals  sodium chloride 0.9% lock flush 3 milliLiter(s) IV Push every 8 hours    MEDICATIONS  (PRN):  ALBUTerol/ipratropium for Nebulization. 3 milliLiter(s) Nebulizer once PRN Shortness of Breath    Allergies:  lactose (Flatulence (Severe); Diarrhea (Severe); Stomach Upset (Severe); Nausea (Severe))  No Known Drug Allergies      Physical Exam:   Constitutional: NAD, well-groomed, well-developed  HEENT: PERRLA, EOMI, no drainage or redness  Neck: supple,  No JVD  Chest: R anterior lateral chest wall with double lumen catheter. Site C/D/I without heat, erythema.  Respiratory: Breath Sounds equal & clear bilaterally to auscultation, no rales/rhonchi/wheezing, no accessory muscle use noted  Cardiovascular: Regular rate, regular rhythm, normal S1, S2; no murmurs or rub  Gastrointestinal: Soft, non-tender, non distended, + bowel sounds  Extremities: ESTRADA x 4, no peripheral edema, no cyanosis, no clubbing   Neurological: A+O x 3; speech clear and intact; no sensory, motor  deficits, normal reflexes  Skin: warm, dry, well perfused      Code Status: full code

## 2019-08-22 NOTE — PROGRESS NOTE ADULT - ASSESSMENT
58y/o M, active smoker, with PMHx of DM2, HTN, HLD, prior CVA (Poor historian-Unclear of time, Reports no sensory/motor deficits), sciatica (Intermit Lt lower ext numbness), history of medication non-compliance.   [place]17: [place] [facility] onset of SOB with concurrent chest discomfort while performing yard work. Admitted with HTN emergency, acute on CKD. + NSTEMI  8[place]20 [place] [facility] [place] LHC revealed multivessel CAD (dLAD 90%, D1 80%, pCXF 70%, mCXF 70%, OM1 80%, mRCA, 95%, RPDLS 70%) for which pt will need CABG. During HD at [facility], pt noted to be obtunded, found to be hypotensive, IVF resuscitation given, BP improved, and mental status returned to baseline.  8/21 dialysis for clearance only, no fluid removed. Vascular surgery consulted (R chest wall  Catheter  may need to be removed d/t non-occlusive thrombus seen, preserve LUE for eventual AVF).      ·  Problem: CAD (coronary artery disease).  Plan: 8/20: [place] [facility] [place]    medical optimization in progress  continue heparin infusion (titrate per ACS nomogram)  continue aspirin, statin  low dose beta-blocker restarted      ·  Problem: Acute renal failure superimposed on chronic kidney disease, unspecified CKD stage, unspecified acute renal failure type.    save LUE for eventual AVF  continue daily assessment to evaluate need for dialysis/clearance  adjust meds per  HD Dosing,   Hold nephrotoxic meds  Trend urine output  Trend BMP.       ·  Problem: Acute combined systolic and diastolic heart failure.  Plan: unclear if acute (likely is chronic though no baseline to compare to )    UF As [place].,       ·  Problem: DM2 (diabetes mellitus, type 2).  Plan: Avoid  Metformin,   FS AC + HS with  Insulin coverage        ·  Problem: Chronic obstructive pulmonary disease, unspecified COPD type.      Plan: On Duoneb + Symbicort      Problem: HTN (hypertension). Plan: On BB,

## 2019-08-22 NOTE — CONSULT NOTE ADULT - CONSULT REASON
Nicotine Dependence  Perioperative Pulmonary Assessment
Uncontrolled DM
hemodialysis access
[facility] - [facility]

## 2019-08-22 NOTE — PROGRESS NOTE ADULT - PROBLEM SELECTOR PLAN 1
8/20: [place] [facility] [place]  continue pre-op work-up/routine (awaiting date)  medical optimization in progess  continue heparin infusion (titrate per ACS nomogram)  continue aspirin, statin  low dose beta-blocker restarted  above plan of care to be discussed with CT surgical team on AM rounds

## 2019-08-22 NOTE — DIETITIAN INITIAL EVALUATION ADULT. - PERTINENT LABORATORY DATA
08-22 Na136 mmol/L Glu 153 mg/dL<H> K+ 4.0 mmol/L Cr  4.24 mg/dL<H> BUN 33.0 mg/dL<H> Phos n/a   Alb n/a   PAB n/a

## 2019-08-22 NOTE — PROGRESS NOTE ADULT - ASSESSMENT
58y/o M, active smoker, with PMHx of DM2, HTN, HLD, prior CVA (Poor historian-Unclear of time, Reports no sensory/motor deficits), sciatica (Intermit Lt lower ext numbness), history of medication non-compliance.   8/17: [place] to [facility] w/ c/o sudden onset of SOB with concurrent chest discomfort while performing yard work. Admitted with HTN emergency, acute on CKD. + NSTEMI  8/20 transferred from [facility] after subsequent LHC revealed multivessel CAD (dLAD 90%, D1 80%, pCXF 70%, mCXF 70%, OM1 80%, mRCA, 95%, RPDLS 70%) for which pt will need CABG. During HD at [facility], pt noted to be obtunded, found to be hypotensive, IVF resuscitation given, BP improved, and mental status returned to baseline.  8/21 dialysis for clearance only, no fluid removed. Vascular surgery consulted (R chest wall permacath may need to be removed d/t non-occlusive thrombus seen, preserve LUE for eventual AVF).

## 2019-08-22 NOTE — DIETITIAN INITIAL EVALUATION ADULT. - OTHER INFO
per MD note: Patient is a 59 year old male with type 2 DM and associated nephropathy, CVA who presented to [facility] with c/o dyspnea and was found to have hypertensive emergency with NSTEMI.  A cardiac cath showed multivessel disease so he was transferred here for consideration of CABG.  He was also found to have acute on chronic renal failure requiring initiation of HD and insertion of permacath.  Patient became hypotensive during last session of HD.  Plan is for fistula placement and deferral of CABG for several weeks until renal status has stabilized.    Spoke with pt and family at length, has not been following any diet at home. meal planning with the plate model and renal failure diet education given. family then asked if they could bring in fruit and "popeyes" for him. advised that following the therapeutic diet provided by the hospital was the best course of action at this time. contact information left with pt and family. follow up education will probably be necessary .

## 2019-08-22 NOTE — CONSULT NOTE ADULT - ASSESSMENT
Optimize - HD,    Review all Records, Will Call  in [facility] in AM,    Eventual TDC , Vessel Mapping,     CABG - To be scheduled,
59 year old male with type 2 DM with associated nephropathy admitted with Hypertensive emergency, NSTEMI and acute on chronic renal failure.  He has multivessel CAD and will require CABG.      1.  Type 2 DM-  while inpatient, would continue insulin therapy.  Due to slight hyperglycemia at times would add standing dose of humalog 2 units before meals.  Depending on insulin requirements over next 24 hours, will consider adding low dose of basal insulin.  Would plan for discharge on basal insulin + renally dosed DPPIV- inhibitor.    2.  CAD/ NSTEMI-  on heparin gtt.  Plan for eventual CABG as per CT surgery.  3.  Renal failure-  plan is for fistula for HD as per renal  4.  Hyperlipidemia-  continue atorvastatin
Assess    Nicotine dependence  Probable mild COPD    Rec    LABA/ICS vs LAMA - whichever causes less problems  OP pulmonary assessment - probably nearer to his home but with us if convenient  Patient should have yearly Low Dose CT lung cancer screening  Smoke cessation/ nicotine replacement indicated

## 2019-08-23 LAB
ANION GAP SERPL CALC-SCNC: 16 MMOL/L — SIGNIFICANT CHANGE UP (ref 5–17)
APTT BLD: 65.2 SEC — HIGH (ref 27.5–36.3)
BUN SERPL-MCNC: 52 MG/DL — HIGH (ref 8–20)
CALCIUM SERPL-MCNC: 6.8 MG/DL — LOW (ref 8.6–10.2)
CHLORIDE SERPL-SCNC: 98 MMOL/L — SIGNIFICANT CHANGE UP (ref 98–107)
CO2 SERPL-SCNC: 20 MMOL/L — LOW (ref 22–29)
CREAT SERPL-MCNC: 5.01 MG/DL — HIGH (ref 0.5–1.3)
GLUCOSE BLDC GLUCOMTR-MCNC: 124 MG/DL — HIGH (ref 70–99)
GLUCOSE BLDC GLUCOMTR-MCNC: 127 MG/DL — HIGH (ref 70–99)
GLUCOSE BLDC GLUCOMTR-MCNC: 127 MG/DL — HIGH (ref 70–99)
GLUCOSE BLDC GLUCOMTR-MCNC: 141 MG/DL — HIGH (ref 70–99)
GLUCOSE BLDC GLUCOMTR-MCNC: 151 MG/DL — HIGH (ref 70–99)
GLUCOSE SERPL-MCNC: 155 MG/DL — HIGH (ref 70–115)
HCT VFR BLD CALC: 36.3 % — LOW (ref 39–50)
HGB BLD-MCNC: 11.7 G/DL — LOW (ref 13–17)
MAGNESIUM SERPL-MCNC: 1.8 MG/DL — SIGNIFICANT CHANGE UP (ref 1.6–2.6)
MCHC RBC-ENTMCNC: 26.3 PG — LOW (ref 27–34)
MCHC RBC-ENTMCNC: 32.2 GM/DL — SIGNIFICANT CHANGE UP (ref 32–36)
MCV RBC AUTO: 81.6 FL — SIGNIFICANT CHANGE UP (ref 80–100)
PLATELET # BLD AUTO: 247 K/UL — SIGNIFICANT CHANGE UP (ref 150–400)
POTASSIUM SERPL-MCNC: 4 MMOL/L — SIGNIFICANT CHANGE UP (ref 3.5–5.3)
POTASSIUM SERPL-SCNC: 4 MMOL/L — SIGNIFICANT CHANGE UP (ref 3.5–5.3)
RBC # BLD: 4.45 M/UL — SIGNIFICANT CHANGE UP (ref 4.2–5.8)
RBC # FLD: 14 % — SIGNIFICANT CHANGE UP (ref 10.3–14.5)
SODIUM SERPL-SCNC: 134 MMOL/L — LOW (ref 135–145)
WBC # BLD: 12.57 K/UL — HIGH (ref 3.8–10.5)
WBC # FLD AUTO: 12.57 K/UL — HIGH (ref 3.8–10.5)

## 2019-08-23 PROCEDURE — 90937 HEMODIALYSIS REPEATED EVAL: CPT

## 2019-08-23 PROCEDURE — 99232 SBSQ HOSP IP/OBS MODERATE 35: CPT

## 2019-08-23 PROCEDURE — 71045 X-RAY EXAM CHEST 1 VIEW: CPT | Mod: 26

## 2019-08-23 PROCEDURE — 99233 SBSQ HOSP IP/OBS HIGH 50: CPT

## 2019-08-23 RX ORDER — HEPARIN SODIUM 5000 [USP'U]/ML
5000 INJECTION INTRAVENOUS; SUBCUTANEOUS EVERY 8 HOURS
Refills: 0 | Status: DISCONTINUED | OUTPATIENT
Start: 2019-08-23 | End: 2019-08-25

## 2019-08-23 RX ORDER — ALTEPLASE 100 MG
2 KIT INTRAVENOUS ONCE
Refills: 0 | Status: COMPLETED | OUTPATIENT
Start: 2019-08-23 | End: 2019-08-23

## 2019-08-23 RX ADMIN — Medication 2 UNIT(S): at 07:53

## 2019-08-23 RX ADMIN — SODIUM CHLORIDE 3 MILLILITER(S): 9 INJECTION INTRAMUSCULAR; INTRAVENOUS; SUBCUTANEOUS at 09:13

## 2019-08-23 RX ADMIN — ALTEPLASE 2 MILLIGRAM(S): KIT at 15:26

## 2019-08-23 RX ADMIN — SEVELAMER CARBONATE 800 MILLIGRAM(S): 2400 POWDER, FOR SUSPENSION ORAL at 07:53

## 2019-08-23 RX ADMIN — BUDESONIDE AND FORMOTEROL FUMARATE DIHYDRATE 2 PUFF(S): 160; 4.5 AEROSOL RESPIRATORY (INHALATION) at 20:06

## 2019-08-23 RX ADMIN — Medication 25 MILLIGRAM(S): at 17:24

## 2019-08-23 RX ADMIN — SEVELAMER CARBONATE 800 MILLIGRAM(S): 2400 POWDER, FOR SUSPENSION ORAL at 17:24

## 2019-08-23 RX ADMIN — TUBERCULIN PURIFIED PROTEIN DERIVATIVE 5 UNIT(S): 5 INJECTION, SOLUTION INTRADERMAL at 07:54

## 2019-08-23 RX ADMIN — Medication 81 MILLIGRAM(S): at 13:45

## 2019-08-23 RX ADMIN — HEPARIN SODIUM 5000 UNIT(S): 5000 INJECTION INTRAVENOUS; SUBCUTANEOUS at 13:45

## 2019-08-23 RX ADMIN — ATORVASTATIN CALCIUM 40 MILLIGRAM(S): 80 TABLET, FILM COATED ORAL at 21:09

## 2019-08-23 RX ADMIN — HEPARIN SODIUM 5000 UNIT(S): 5000 INJECTION INTRAVENOUS; SUBCUTANEOUS at 21:09

## 2019-08-23 RX ADMIN — SODIUM CHLORIDE 3 MILLILITER(S): 9 INJECTION INTRAMUSCULAR; INTRAVENOUS; SUBCUTANEOUS at 05:38

## 2019-08-23 RX ADMIN — Medication 1: at 21:10

## 2019-08-23 RX ADMIN — ALTEPLASE 2 MILLIGRAM(S): KIT at 15:25

## 2019-08-23 RX ADMIN — Medication 25 MILLIGRAM(S): at 05:38

## 2019-08-23 RX ADMIN — SODIUM CHLORIDE 3 MILLILITER(S): 9 INJECTION INTRAMUSCULAR; INTRAVENOUS; SUBCUTANEOUS at 21:08

## 2019-08-23 RX ADMIN — Medication 2 UNIT(S): at 17:24

## 2019-08-23 NOTE — PROGRESS NOTE ADULT - ASSESSMENT
1) ESRD on HD  2) MBD of renal dx  3) Anemia of renal dx  4) Vol HTN    Plan for tunneled catheter  Spoke with vascular surgery  Evenutal CABG  HD today  Continue current management at this time

## 2019-08-23 NOTE — PROGRESS NOTE ADULT - PROBLEM SELECTOR PLAN 1
8/20: [place] [facility] [place]  likely d/c home when optimized and return as outpt for elective surgery  cont heparin gtt  asa, lipitor and lopressor  cont supportive care  likely transfer to floor today  d/w team In AM rounds

## 2019-08-23 NOTE — PROGRESS NOTE ADULT - SUBJECTIVE AND OBJECTIVE BOX
[facility] DIVISION OF KIDNEY DISEASES AND HYPERTENSION -- FOLLOW UP NOTE  --------------------------------------------------------------------------------  Chief Complaint:  ESRD on HD    24 hour events/subjective:  Pt seen and examined  NAD  HD today  CABG planned, not scheduled yet      PAST HISTORY  --------------------------------------------------------------------------------  No significant changes to PMH, PSH, FHx, SHx, unless otherwise noted    ALLERGIES & MEDICATIONS  --------------------------------------------------------------------------------  Allergies    lactose (Flatulence (Severe); Diarrhea (Severe); Stomach Upset (Severe); Nausea (Severe))  No Known Drug Allergies    Intolerances      Standing Inpatient Medications  aspirin enteric coated 81 milliGRAM(s) Oral daily  atorvastatin 40 milliGRAM(s) Oral at bedtime  buDESOnide 160 MICROgram(s)/formoterol 4.5 MICROgram(s) Inhaler 2 Puff(s) Inhalation two times a day  dextrose 5%. 1000 milliLiter(s) IV Continuous <Continuous>  dextrose 50% Injectable 12.5 Gram(s) IV Push once  dextrose 50% Injectable 25 Gram(s) IV Push once  dextrose 50% Injectable 25 Gram(s) IV Push once  heparin  Injectable 5000 Unit(s) SubCutaneous every 8 hours  insulin lispro (HumaLOG) corrective regimen sliding scale   SubCutaneous Before meals and at bedtime  insulin lispro Injectable (HumaLOG) 2 Unit(s) SubCutaneous three times a day before meals  metoprolol tartrate 25 milliGRAM(s) Oral every 12 hours  sevelamer carbonate 800 milliGRAM(s) Oral three times a day with meals  sodium chloride 0.9% lock flush 3 milliLiter(s) IV Push every 8 hours    PRN Inpatient Medications  ALBUTerol/ipratropium for Nebulization. 3 milliLiter(s) Nebulizer once PRN  dextrose 40% Gel 15 Gram(s) Oral once PRN  glucagon  Injectable 1 milliGRAM(s) IntraMuscular once PRN      REVIEW OF SYSTEMS  --------------------------------------------------------------------------------  Gen: No weight changes, fatigue, fevers/chills, weakness  Skin: No rashes  Head/Eyes/Ears/Mouth: No headache; Normal hearing; Normal vision w/o blurriness; No sinus pain/discomfort, sore throat  Respiratory: No dyspnea, cough, wheezing, hemoptysis  CV: No chest pain, PND, orthopnea  GI: No abdominal pain, diarrhea, constipation, nausea, vomiting, melena, hematochezia  : No increased frequency, dysuria, hematuria, nocturia  MSK: No joint pain/swelling; no back pain; no edema  Neuro: No dizziness/lightheadedness, weakness, seizures, numbness, tingling  Heme: No easy bruising or bleeding  Endo: No heat/cold intolerance  Psych: No significant nervousness, anxiety, stress, depression    All other systems were reviewed and are negative, except as noted.    VITALS/PHYSICAL EXAM  --------------------------------------------------------------------------------  T(C): 36.6 (08-23-19 @ 10:20), Max: 37.2 (08-22-19 @ 14:00)  HR: 60 (08-23-19 @ 10:20) (58 - 83)  BP: 137/60 (08-23-19 @ 10:20) (133/62 - 195/93)  RR: 18 (08-23-19 @ 10:20) (15 - 25)  SpO2: 99% (08-23-19 @ 10:20) (94% - 100%)  Wt(kg): --        08-22-19 @ 07:01  -  08-23-19 @ 07:00  --------------------------------------------------------  IN: 1144 mL / OUT: 2300 mL / NET: -1156 mL      Physical Exam:  	Gen: NAD, well-appearing  	HEENT: PERRL, supple neck, clear oropharynx  	Pulm: CTA B/L  	CV: RRR, S1S2; no rub  	Back: No spinal or CVA tenderness; no sacral edema  	Abd: +BS, soft, nontender/nondistended  	: No suprapubic tenderness  	UE: Warm, FROM, no clubbing, intact strength; no edema; no asterixis  	LE: Warm, FROM, no clubbing, intact strength; no edema  	Neuro: No focal deficits, intact gait  	Psych: Normal affect and mood  	Skin: Warm, without rashes  	Vascular access:    LABS/STUDIES  --------------------------------------------------------------------------------              11.7   12.57 >-----------<  247      [08-23-19 @ 05:46]              36.3     134  |  98  |  52.0  ----------------------------<  155      [08-23-19 @ 05:46]  4.0   |  20.0  |  5.01        Ca     6.8     [08-23-19 @ 05:46]      Mg     1.8     [08-23-19 @ 05:46]    TPro  x   /  Alb  x   /  TBili  x   /  DBili  x   /  AST  x   /  ALT  22  /  AlkPhos  x   [08-21-19 @ 13:27]      PTT: 65.2       [08-23-19 @ 05:46]    Troponin 0.20      [08-22-19 @ 03:53]        [08-22-19 @ 03:53]    Creatinine Trend:  SCr 5.01 [08-23 @ 05:46]  SCr 4.24 [08-22 @ 03:53]  SCr 4.66 [08-21 @ 02:48]  SCr 5.76 [08-20 @ 13:09]    Urinalysis - [08-20-19 @ 13:32]      Color Yellow / Appearance Clear / SG 1.005 / pH 7.0      Gluc Negative / Ketone Negative  / Bili Negative / Urobili Negative       Blood Small / Protein 100 / Leuk Est Negative / Nitrite Negative      RBC 3-5 / WBC 0-2 / Hyaline  / Gran  / Sq Epi  / Non Sq Epi Occasional / Bacteria Occasional    Urine Creatinine 49      [08-20-19 @ 15:44]  Urine Protein 127.0      [08-20-19 @ 15:44]    Iron 37, TIBC 204, %sat 18      [08-20-19 @ 15:20]  Ferritin 305      [08-20-19 @ 15:20]  PTH -- (Ca 6.9)      [08-20-19 @ 23:33]   456  Vitamin D (25OH) 11.3      [08-21-19 @ 00:00]  HbA1c 8.4      [08-20-19 @ 13:09]  TSH 1.97      [08-20-19 @ 13:09]  Lipid: chol 209, , HDL 32,       [08-20-19 @ 13:09]    HBsAb <3.0      [08-21-19 @ 22:09]  HBsAg Nonreact      [08-21-19 @ 22:09]  HBcAb Nonreact      [08-21-19 @ 22:09]  HCV 0.12, Nonreact      [08-21-19 @ 17:20]

## 2019-08-23 NOTE — PROGRESS NOTE ADULT - SUBJECTIVE AND OBJECTIVE BOX
Seen for follow up of Type 2 DM    Interval HPI/ Overnight Events:  Seen while on HD today.  Denies any CP or Dyspnea.    MEDICATIONS  (STANDING):  alteplase for catheter clearance 2 milliGRAM(s) Catheter once  alteplase for catheter clearance 2 milliGRAM(s) Catheter once  aspirin enteric coated 81 milliGRAM(s) Oral daily  atorvastatin 40 milliGRAM(s) Oral at bedtime  buDESOnide 160 MICROgram(s)/formoterol 4.5 MICROgram(s) Inhaler 2 Puff(s) Inhalation two times a day  dextrose 5%. 1000 milliLiter(s) (50 mL/Hr) IV Continuous <Continuous>  dextrose 50% Injectable 12.5 Gram(s) IV Push once  dextrose 50% Injectable 25 Gram(s) IV Push once  dextrose 50% Injectable 25 Gram(s) IV Push once  heparin  Injectable 5000 Unit(s) SubCutaneous every 8 hours  insulin lispro (HumaLOG) corrective regimen sliding scale   SubCutaneous Before meals and at bedtime  insulin lispro Injectable (HumaLOG) 2 Unit(s) SubCutaneous three times a day before meals  metoprolol tartrate 25 milliGRAM(s) Oral every 12 hours  sevelamer carbonate 800 milliGRAM(s) Oral three times a day with meals  sodium chloride 0.9% lock flush 3 milliLiter(s) IV Push every 8 hours    Vital Signs Last 24 Hrs  T(C): 36.6 (23 Aug 2019 10:20), Max: 37.2 (22 Aug 2019 14:00)  T(F): 97.9 (23 Aug 2019 10:20), Max: 99 (22 Aug 2019 14:00)  HR: 60 (23 Aug 2019 10:20) (58 - 83)  BP: 137/60 (23 Aug 2019 10:20) (133/62 - 195/93)  BP(mean): 90 (23 Aug 2019 08:00) (82 - 146)  RR: 18 (23 Aug 2019 10:20) (15 - 25)  SpO2: 99% (23 Aug 2019 10:20) (94% - 100%)    PE:  Gen: AAO, NAD  HEENT:  sclera anicteric, MMM  Neck:  no thyromegaly, no LAD  CV:  nl S1 + S2, RRR, no m/r/g  Resp:  nl respiratory effort, CTA b/l  GI/ Abd: soft, NT/ ND, BS +  MS:  no c/c/e, nl muscle tone  Skin:  no acanthosis    LABS:  08-23    134<L>  |  98  |  52.0<H>  ----------------------------<  155<H>  4.0   |  20.0<L>  |  5.01<H>    Ca    6.8<L>      23 Aug 2019 05:46  Mg     1.8     08-23    TPro  x   /  Alb  x   /  TBili  x   /  DBili  x   /  AST  x   /  ALT  22  /  AlkPhos  x   08-21                          11.7   12.57 )-----------( 247      ( 23 Aug 2019 05:46 )             36.3       CAPILLARY BLOOD GLUCOSE  POCT Blood Glucose.: 124 mg/dL (23 Aug 2019 12:06)  POCT Blood Glucose.: 141 mg/dL (23 Aug 2019 07:16)  POCT Blood Glucose.: 151 mg/dL (22 Aug 2019 21:46)  POCT Blood Glucose.: 125 mg/dL (22 Aug 2019 15:59)

## 2019-08-23 NOTE — PROGRESS NOTE ADULT - SUBJECTIVE AND OBJECTIVE BOX
pt's HD tx. for 2hrs 30 mins done.     HD catheter  function is poor.     0.5 kg fluid removal.     post access care done without complications.     HD Scheduled for AM ,

## 2019-08-23 NOTE — CHART NOTE - NSCHARTNOTEFT_GEN_A_CORE
CTS Transfer Acceptance Note to 4 CTU from CTICU    Pt seen at bedside.  RN at bedside preparing  for dialysis. VSS. Medications and labs reviewed.  Pt PPD is negative.  Plan for AVF on Monday then dischrge home when he has a seat in the community.  Pt will follow up as an outpatient for CABG

## 2019-08-23 NOTE — PHYSICAL THERAPY INITIAL EVALUATION ADULT - ADDITIONAL COMMENTS
Pt reports living with s/o, son, and nephew in a private house. 2 steps to enter with post to hold onto. No stairs inside. Ambulates with straight cane, independently. Denies other DME.

## 2019-08-23 NOTE — PHYSICAL THERAPY INITIAL EVALUATION ADULT - GENERAL OBSERVATIONS, REHAB EVAL
Pt received lying in bed on 4 east, (+) cardiac monitor, NAD. Agreeable to PT. RN: Miki present t/o session.

## 2019-08-23 NOTE — PHYSICAL THERAPY INITIAL EVALUATION ADULT - LEVEL OF INDEPENDENCE: STAIR NEGOTIATION, REHAB EVAL
to be assessed when appropriate. Pt with c/o bilateral LE's, burning with ambulation. Increasing with further distance.

## 2019-08-23 NOTE — PHYSICAL THERAPY INITIAL EVALUATION ADULT - PERTINENT HX OF CURRENT PROBLEM, REHAB EVAL
58 y[place] [facility] [place] with CP while doing yard work. Presented to [facility] with HTN, elevated creatinine, and an EF 35% on echo.  Plan for AV fistula placement, then CABG in a few weeks.

## 2019-08-23 NOTE — PROGRESS NOTE ADULT - ASSESSMENT
60y/o M, active smoker, with PMHx of DM2, HTN, HLD, prior CVA (Poor historian-Unclear of time, Reports no sensory/motor deficits), sciatica (Intermit Lt lower ext numbness), history of medication non-compliance.   8/17: presented to [facility] [place] w/ c/o sudden onset of SOB with concurrent chest discomfort while performing yard work. Admitted with HTN emergency, acute on CKD. + NSTEMI  8/20 transferred from [facility] [place] after subsequent LHC revealed multivessel CAD (dLAD 90%, D1 80%, pCXF 70%, mCXF 70%, OM1 80%, mRCA, 95%, RPDLS 70%) for which pt will need CABG. During HD at [facility], pt noted to be obtunded, found to be hypotensive, IVF resuscitation given, BP improved, and mental status returned to baseline.  8/21 dialysis for clearance only, no fluid removed. Vascular surgery consulted (R chest wall permacath may need to be removed d/t non-occlusive thrombus seen, preserve LUE for eventual AVF).  8/22 tentative AVF on Monday 8/26 with vascular

## 2019-08-23 NOTE — PROGRESS NOTE ADULT - PROBLEM SELECTOR PLAN 4
Hold Metformin-Elevated BUN/Cr  FS AC+HS with coverage  endocrine consult appreciated, humalog added, will likely need basal insulin started for discharge

## 2019-08-23 NOTE — PROGRESS NOTE ADULT - SUBJECTIVE AND OBJECTIVE BOX
Brief Hospital Course: 59M transferred from [facility] 8/20 with multivessal dz, new onset HD, acute systolic HF, uncontrolled DM, developed brief episode of hypotension and AMS/obtunded on HD, resolved with fluid bolus and cessation of HD, otherwise has remained hemodynamically stable;    Significant recent/past 24 hr events: none    Subjective: "i'm better thanks" denies CP, palpitations, SOB, cough, fever, chills, HA, diaphoresis, itchiness/rash, dizziness/lightheadedness, numbness/tingling, vision changes, abd pain, N/V;    Review of Systems       ROS negative x 10 systems except as noted above    Patient is a 59y old  Male who presents with a chief complaint of CAD-Preop CABG (22 Aug 2019 17:06)    HPI:  58y/o M, active smoker, with PMHx of DM2, HTN, HLD, prior CVA (Poor historian-Unclear of time, Reports no sensory/motor deficits), sciatica (Intermit Lt lower ext numbness), history of medication non-compliance presents as a transfer from [facility] for CTS evaluation for possible CABG, under care of Dr. Mims. Pt presented to [facility] ED on 8/17/19 with sudden onset of SOB with concurrent chest discomfort while performing yard work. Reports this is the first occurrence of these symptoms and without identifiable provoking/relieving factors. In [facility] ED pt found to be HTN-'s, 's (Treated with Labetalol, hydralazine, and procardia), with  signs of end organ damage: Creatinine 4.5 (Baseline 2018 Cr: 2.2), elevated transaminase (ALT:70, AST:95), Trop 0.123, BNP 22,000. CXR: + increased pulm vascular congestion, ECHO: EF 35% with LVH, Mild/Mod MR, mild TR, mild AI, EKG: +ST/T-wave abnormality suggesting inferior & Anterolateral ischemia, Lower ext doppler: NEG DVT, Renal US consistent with renal disease (Pt has no known Hx of renal disease). 8/19 Continued Cr elevation (4.9-5.8) requiring HD (8/19 Rt TDC Permacath). 8/20: Cardiac cath (Held for Cr level optimization): +MVD showing dLAD 90%, D1 80%, pCXF 70%, mCXF 70%, OM1 80%, mRCA, 95%, RPDLS 70%.   Pt emergently transferred via helicopter to [facility] and admitted to [facility]. On admission pt is hemodynamically stable: HR 60's NSR, 's, sat 97% RA. Preop workup started and surgical interventions explained in detail and educated on risk/benefits. Pt currently in NAD and without acute complaints. Denies N/V/D HA, dizziness, blurry vision, numbness/tingling, SOB, cough, chest pain, palpitations, abd pain or urinary sx's. (20 Aug 2019 12:12)    PAST MEDICAL & SURGICAL HISTORY:  HLD (hyperlipidemia)  Cerebrovascular accident (CVA): Poor historian, unaware of date  DM2 (diabetes mellitus, type 2)  HTN (hypertension)  No significant past surgical history    FAMILY HISTORY:  FH: HTN (hypertension)  FH: CAD (coronary artery disease)    Vitals   ICU Vital Signs Last 24 Hrs  T(C): 37.1 (23 Aug 2019 00:10), Max: 37.4 (22 Aug 2019 10:00)  T(F): 98.8 (23 Aug 2019 00:10), Max: 99.4 (22 Aug 2019 10:00)  HR: 71 (23 Aug 2019 00:00) (62 - 83)  BP: 171/79 (23 Aug 2019 00:00) (131/63 - 195/93)  BP(mean): 112 (23 Aug 2019 00:00) (86 - 146)  RR: 18 (23 Aug 2019 00:00) (15 - 26)  SpO2: 98% (23 Aug 2019 00:00) (94% - 100%)    I&O's Detail    21 Aug 2019 07:01  -  22 Aug 2019 07:00  --------------------------------------------------------  IN:    heparin  Infusion.: 380 mL    Oral Fluid: 720 mL  Total IN: 1100 mL    OUT:    Voided: 1450 mL  Total OUT: 1450 mL    Total NET: -350 mL    22 Aug 2019 07:01  -  23 Aug 2019 02:19  --------------------------------------------------------  IN:    heparin  Infusion.: 170 mL    Oral Fluid: 770 mL  Total IN: 940 mL    OUT:    Voided: 900 mL  Total OUT: 900 mL    Total NET: 40 mL    LABS                        12.0   12.78 )-----------( 245      ( 22 Aug 2019 03:53 )             38.3     08-22    136  |  98  |  33.0<H>  ----------------------------<  153<H>  4.0   |  24.0  |  4.24<H>    Ca    7.1<L>      22 Aug 2019 03:53  Mg     1.8     08-22    TPro  x   /  Alb  x   /  TBili  x   /  DBili  x   /  AST  x   /  ALT  22  /  AlkPhos  x   08-21    LIVER FUNCTIONS - ( 21 Aug 2019 13:27 )  Alb: x     / Pro: x     / ALK PHOS: x     / ALT: 22 U/L / AST: x     / GGT: x           PTT - ( 22 Aug 2019 10:26 )  PTT:60.7 sec  CARDIAC MARKERS ( 22 Aug 2019 03:53 )   U/L / CKMBx     / Troponin T0.20 ng/mL /    POCT Blood Glucose.: 151 mg/dL (08-22-19 @ 21:46)  POCT Blood Glucose.: 125 mg/dL (08-22-19 @ 15:59)  POCT Blood Glucose.: 229 mg/dL (08-22-19 @ 11:06)  POCT Blood Glucose.: 144 mg/dL (08-22-19 @ 07:11)    MEDICATIONS  (STANDING):  aspirin enteric coated 81 milliGRAM(s) Oral daily  atorvastatin 40 milliGRAM(s) Oral at bedtime  buDESOnide 160 MICROgram(s)/formoterol 4.5 MICROgram(s) Inhaler 2 Puff(s) Inhalation two times a day  dextrose 5%. 1000 milliLiter(s) (50 mL/Hr) IV Continuous <Continuous>  dextrose 50% Injectable 12.5 Gram(s) IV Push once  dextrose 50% Injectable 25 Gram(s) IV Push once  dextrose 50% Injectable 25 Gram(s) IV Push once  heparin  Infusion.  Unit(s)/Hr (10 mL/Hr) IV Continuous <Continuous>  insulin lispro (HumaLOG) corrective regimen sliding scale   SubCutaneous Before meals and at bedtime  insulin lispro Injectable (HumaLOG) 2 Unit(s) SubCutaneous three times a day before meals  metoprolol tartrate 25 milliGRAM(s) Oral every 12 hours  sevelamer carbonate 800 milliGRAM(s) Oral three times a day with meals  sodium chloride 0.9% lock flush 3 milliLiter(s) IV Push every 8 hours    MEDICATIONS  (PRN):  ALBUTerol/ipratropium for Nebulization. 3 milliLiter(s) Nebulizer once PRN Shortness of Breath  dextrose 40% Gel 15 Gram(s) Oral once PRN Blood Glucose LESS THAN 70 milliGRAM(s)/deciliter  glucagon  Injectable 1 milliGRAM(s) IntraMuscular once PRN Glucose LESS THAN 70 milligrams/deciliter    Allergies:  lactose (Flatulence (Severe); Diarrhea (Severe); Stomach Upset (Severe); Nausea (Severe))  No Known Drug Allergies    Physical Exam:   Constitutional: NAD, well-groomed, well-developed  HEENT: PERRLA, no drainage or redness  Neck: supple,  No JVD  Respiratory: crackles Left base, clear on right  Cardiovascular: S1S2 RRR, no murmurs/rubs  Gastrointestinal: + BS soft NT ND  Extremities: ESTRADA x 4,no edema/clubbing/cyanosis   Vascular: 2+ DP and radial pulses b/l  Neurological: A+O x 3; speech clear and intact, non focal  Psychiatric: calm, normal mood, normal affect  Skin: warm, dry, well perfused, no rashes  R chest permacath, dsg C/D/I

## 2019-08-23 NOTE — PROGRESS NOTE ADULT - ASSESSMENT
59 year old male with type 2 DM with associated nephropathy admitted with Hypertensive emergency, NSTEMI and acute on chronic renal failure.  He has multivessel CAD and will require CABG.  His glycemic control is acceptable on current insulin regimen.       1.  Type 2 DM-   continue prandial insulin as ordered.  On discharge will consider low dose of Lantus + januvia 25 mg daily.    2.  CAD/ NSTEMI- As per CT surgery team.  Plan for eventual CABG as per CT surgery.  3.  Renal failure-   On HD as per renal.  Getting fistula next week.    4.  Hyperlipidemia-  continue atorvastatin

## 2019-08-24 LAB
ANION GAP SERPL CALC-SCNC: 17 MMOL/L — SIGNIFICANT CHANGE UP (ref 5–17)
BUN SERPL-MCNC: 51 MG/DL — HIGH (ref 8–20)
CALCIUM SERPL-MCNC: 6.9 MG/DL — LOW (ref 8.6–10.2)
CHLORIDE SERPL-SCNC: 97 MMOL/L — LOW (ref 98–107)
CO2 SERPL-SCNC: 22 MMOL/L — SIGNIFICANT CHANGE UP (ref 22–29)
CREAT SERPL-MCNC: 4.77 MG/DL — HIGH (ref 0.5–1.3)
GLUCOSE BLDC GLUCOMTR-MCNC: 110 MG/DL — HIGH (ref 70–99)
GLUCOSE BLDC GLUCOMTR-MCNC: 121 MG/DL — HIGH (ref 70–99)
GLUCOSE BLDC GLUCOMTR-MCNC: 153 MG/DL — HIGH (ref 70–99)
GLUCOSE BLDC GLUCOMTR-MCNC: 181 MG/DL — HIGH (ref 70–99)
GLUCOSE SERPL-MCNC: 138 MG/DL — HIGH (ref 70–115)
HCT VFR BLD CALC: 38.1 % — LOW (ref 39–50)
HGB BLD-MCNC: 12 G/DL — LOW (ref 13–17)
MAGNESIUM SERPL-MCNC: 1.9 MG/DL — SIGNIFICANT CHANGE UP (ref 1.6–2.6)
MCHC RBC-ENTMCNC: 26.1 PG — LOW (ref 27–34)
MCHC RBC-ENTMCNC: 31.5 GM/DL — LOW (ref 32–36)
MCV RBC AUTO: 83 FL — SIGNIFICANT CHANGE UP (ref 80–100)
PLATELET # BLD AUTO: 227 K/UL — SIGNIFICANT CHANGE UP (ref 150–400)
POTASSIUM SERPL-MCNC: 4 MMOL/L — SIGNIFICANT CHANGE UP (ref 3.5–5.3)
POTASSIUM SERPL-SCNC: 4 MMOL/L — SIGNIFICANT CHANGE UP (ref 3.5–5.3)
RBC # BLD: 4.59 M/UL — SIGNIFICANT CHANGE UP (ref 4.2–5.8)
RBC # FLD: 14.1 % — SIGNIFICANT CHANGE UP (ref 10.3–14.5)
RENIN PLAS-CCNC: 1.65 NG/ML/HR — SIGNIFICANT CHANGE UP (ref 0.17–5.38)
SODIUM SERPL-SCNC: 136 MMOL/L — SIGNIFICANT CHANGE UP (ref 135–145)
WBC # BLD: 12.5 K/UL — HIGH (ref 3.8–10.5)
WBC # FLD AUTO: 12.5 K/UL — HIGH (ref 3.8–10.5)

## 2019-08-24 PROCEDURE — 90937 HEMODIALYSIS REPEATED EVAL: CPT

## 2019-08-24 PROCEDURE — 99232 SBSQ HOSP IP/OBS MODERATE 35: CPT

## 2019-08-24 RX ORDER — CALCIUM CARBONATE 500(1250)
2 TABLET ORAL
Refills: 0 | Status: DISCONTINUED | OUTPATIENT
Start: 2019-08-24 | End: 2019-08-29

## 2019-08-24 RX ORDER — PARICALCITOL 2 UG/1
2 CAPSULE, GELATIN COATED ORAL
Refills: 0 | Status: DISCONTINUED | OUTPATIENT
Start: 2019-08-24 | End: 2019-08-29

## 2019-08-24 RX ADMIN — Medication 2 UNIT(S): at 12:49

## 2019-08-24 RX ADMIN — Medication 2 UNIT(S): at 09:04

## 2019-08-24 RX ADMIN — Medication 81 MILLIGRAM(S): at 09:04

## 2019-08-24 RX ADMIN — Medication 1: at 21:54

## 2019-08-24 RX ADMIN — SODIUM CHLORIDE 3 MILLILITER(S): 9 INJECTION INTRAMUSCULAR; INTRAVENOUS; SUBCUTANEOUS at 05:58

## 2019-08-24 RX ADMIN — SODIUM CHLORIDE 3 MILLILITER(S): 9 INJECTION INTRAMUSCULAR; INTRAVENOUS; SUBCUTANEOUS at 10:54

## 2019-08-24 RX ADMIN — SODIUM CHLORIDE 3 MILLILITER(S): 9 INJECTION INTRAMUSCULAR; INTRAVENOUS; SUBCUTANEOUS at 21:48

## 2019-08-24 RX ADMIN — Medication 25 MILLIGRAM(S): at 06:00

## 2019-08-24 RX ADMIN — SEVELAMER CARBONATE 800 MILLIGRAM(S): 2400 POWDER, FOR SUSPENSION ORAL at 12:48

## 2019-08-24 RX ADMIN — Medication 1: at 12:48

## 2019-08-24 RX ADMIN — Medication 2 UNIT(S): at 17:11

## 2019-08-24 RX ADMIN — Medication 2 TABLET(S): at 17:11

## 2019-08-24 RX ADMIN — HEPARIN SODIUM 5000 UNIT(S): 5000 INJECTION INTRAVENOUS; SUBCUTANEOUS at 06:00

## 2019-08-24 RX ADMIN — SEVELAMER CARBONATE 800 MILLIGRAM(S): 2400 POWDER, FOR SUSPENSION ORAL at 17:10

## 2019-08-24 RX ADMIN — BUDESONIDE AND FORMOTEROL FUMARATE DIHYDRATE 2 PUFF(S): 160; 4.5 AEROSOL RESPIRATORY (INHALATION) at 09:40

## 2019-08-24 RX ADMIN — HEPARIN SODIUM 5000 UNIT(S): 5000 INJECTION INTRAVENOUS; SUBCUTANEOUS at 17:11

## 2019-08-24 RX ADMIN — ATORVASTATIN CALCIUM 40 MILLIGRAM(S): 80 TABLET, FILM COATED ORAL at 21:55

## 2019-08-24 RX ADMIN — SEVELAMER CARBONATE 800 MILLIGRAM(S): 2400 POWDER, FOR SUSPENSION ORAL at 09:04

## 2019-08-24 RX ADMIN — HEPARIN SODIUM 5000 UNIT(S): 5000 INJECTION INTRAVENOUS; SUBCUTANEOUS at 21:55

## 2019-08-24 RX ADMIN — BUDESONIDE AND FORMOTEROL FUMARATE DIHYDRATE 2 PUFF(S): 160; 4.5 AEROSOL RESPIRATORY (INHALATION) at 21:32

## 2019-08-24 RX ADMIN — Medication 25 MILLIGRAM(S): at 17:10

## 2019-08-24 NOTE — PROGRESS NOTE ADULT - PROBLEM SELECTOR PLAN 1
8/20: Transferred from [facility] [place] for CABG workup  likely d/c home when optimized and return as outpt for elective surgery  cont heparin gtt  asa, lipitor and lopressor  cont supportive care  likely transfer to floor today  d/w team In AM rounds 8/20: Transferred from [facility] for CABG workup  likely d/c home when optimized/seat available and return as outpt for elective surgery  off heparin gtt  asa, lipitor and lopressor  discussed with Dr Mims in AM rounds

## 2019-08-24 NOTE — PROGRESS NOTE ADULT - SUBJECTIVE AND OBJECTIVE BOX
Subjective:    VITAL SIGNS  Vital Signs Last 24 Hrs  T(C): 36.4 (19 @ 10:00), Max: 37.4 (19 @ 21:05)  T(F): 97.6 (19 @ 10:00), Max: 99.3 (19 @ 21:05)  HR: 54 (19 @ 10:00) (54 - 82)  BP: 135/57 (19 @ 10:00) (123/50 - 144/72)  RR: 18 (19 @ 10:00) (16 - 18)  SpO2: 100% (19 @ 10:00) (96% - 100%)  on (O2)              Telemetry/Alarms:    LVEF:     MEDICATIONS  ALBUTerol/ipratropium for Nebulization. 3 milliLiter(s) Nebulizer once PRN  aspirin enteric coated 81 milliGRAM(s) Oral daily  atorvastatin 40 milliGRAM(s) Oral at bedtime  buDESOnide 160 MICROgram(s)/formoterol 4.5 MICROgram(s) Inhaler 2 Puff(s) Inhalation two times a day  calcium carbonate   1250 mG (OsCal) 2 Tablet(s) Oral two times a day  dextrose 40% Gel 15 Gram(s) Oral once PRN  dextrose 5%. 1000 milliLiter(s) IV Continuous <Continuous>  dextrose 50% Injectable 12.5 Gram(s) IV Push once  dextrose 50% Injectable 25 Gram(s) IV Push once  dextrose 50% Injectable 25 Gram(s) IV Push once  glucagon  Injectable 1 milliGRAM(s) IntraMuscular once PRN  heparin  Injectable 5000 Unit(s) SubCutaneous every 8 hours  insulin lispro (HumaLOG) corrective regimen sliding scale   SubCutaneous Before meals and at bedtime  insulin lispro Injectable (HumaLOG) 2 Unit(s) SubCutaneous three times a day before meals  metoprolol tartrate 25 milliGRAM(s) Oral every 12 hours  paricalcitol Injectable 2 MICROGram(s) IV Push <User Schedule>  sevelamer carbonate 800 milliGRAM(s) Oral three times a day with meals  sodium chloride 0.9% lock flush 3 milliLiter(s) IV Push every 8 hours      PHYSICAL EXAM  General: well nourished, well developed, no acute distress  Neurology: alert and oriented x 3, nonfocal, no gross deficits  Respiratory: clear to auscultation bilaterally  CV: regular rate and rhythm, normal S1, S2  Abdomen: soft, nontender, nondistended, positive bowel sounds, last bowel movement   Extremities: warm, well perfused. no edema. + DP pulses  R SC permacath       @ 07:  -   @ 07:00  --------------------------------------------------------  IN: 30 mL / OUT: 700 mL / NET: -670 mL     @ 07:  -   @ 13:03  --------------------------------------------------------  IN: 240 mL / OUT: 0 mL / NET: 240 mL        Weights:  Daily Height in cm: 162.56 (23 Aug 2019 21:05)    Daily Weight in k (24 Aug 2019 10:00)  Admit Wt: Drug Dosing Weight  Height (cm): 162.6 (23 Aug 2019 21:05)  Weight (kg): 86.4 (20 Aug 2019 12:00)  BMI (kg/m2): 32.7 (23 Aug 2019 21:05)  BSA (m2): 1.92 (23 Aug 2019 21:05)    All laboratory results, radiology and medications reviewed.    LABS      136  |  97<L>  |  51.0<H>  ----------------------------<  138<H>  4.0   |  22.0  |  4.77<H>    Ca    6.9<L>      24 Aug 2019 06:32  Mg     1.9                                        12.0   12.50 )-----------( 227      ( 24 Aug 2019 06:32 )             38.1          PTT - ( 23 Aug 2019 05:46 )  PTT:65.2 sec    CAPILLARY BLOOD GLUCOSE      POCT Blood Glucose.: 153 mg/dL (24 Aug 2019 12:11)  POCT Blood Glucose.: 121 mg/dL (24 Aug 2019 08:19)  POCT Blood Glucose.: 127 mg/dL (23 Aug 2019 21:49)  POCT Blood Glucose.: 151 mg/dL (23 Aug 2019 21:00)  POCT Blood Glucose.: 127 mg/dL (23 Aug 2019 17:07)           Today's CXR:    Today's EKG:    PAST MEDICAL & SURGICAL HISTORY:  HLD (hyperlipidemia)  Cerebrovascular accident (CVA): Poor historian, unaware of date  DM2 (diabetes mellitus, type 2)  HTN (hypertension)  No significant past surgical history Subjective: "I feel ok" Denies CP, SOB. Seen while undergoing dialysis. Reports presented with SOB but is now improved.     VITAL SIGNS  Vital Signs Last 24 Hrs  T(C): 36.4 (19 @ 10:00), Max: 37.4 (19 @ 21:05)  T(F): 97.6 (19 @ 10:00), Max: 99.3 (19 @ 21:05)  HR: 54 (19 @ 10:00) (54 - 82)  BP: 135/57 (19 @ 10:00) (123/50 - 144/72)  RR: 18 (19 @ 10:00) (16 - 18)  SpO2: 100% (19 @ 10:00) (96% - 100%)  onRA            Telemetry/Alarms:  SR  with PACs PVCs  LVEF:     MEDICATIONS  ALBUTerol/ipratropium for Nebulization. 3 milliLiter(s) Nebulizer once PRN  aspirin enteric coated 81 milliGRAM(s) Oral daily  atorvastatin 40 milliGRAM(s) Oral at bedtime  buDESOnide 160 MICROgram(s)/formoterol 4.5 MICROgram(s) Inhaler 2 Puff(s) Inhalation two times a day  calcium carbonate   1250 mG (OsCal) 2 Tablet(s) Oral two times a day  dextrose 40% Gel 15 Gram(s) Oral once PRN  dextrose 5%. 1000 milliLiter(s) IV Continuous <Continuous>  dextrose 50% Injectable 12.5 Gram(s) IV Push once  dextrose 50% Injectable 25 Gram(s) IV Push once  dextrose 50% Injectable 25 Gram(s) IV Push once  glucagon  Injectable 1 milliGRAM(s) IntraMuscular once PRN  heparin  Injectable 5000 Unit(s) SubCutaneous every 8 hours  insulin lispro (HumaLOG) corrective regimen sliding scale   SubCutaneous Before meals and at bedtime  insulin lispro Injectable (HumaLOG) 2 Unit(s) SubCutaneous three times a day before meals  metoprolol tartrate 25 milliGRAM(s) Oral every 12 hours  paricalcitol Injectable 2 MICROGram(s) IV Push <User Schedule>  sevelamer carbonate 800 milliGRAM(s) Oral three times a day with meals  sodium chloride 0.9% lock flush 3 milliLiter(s) IV Push every 8 hours      PHYSICAL EXAM  General: well nourished, well developed, no acute distress  Neurology: alert and oriented x 3, nonfocal, no gross deficits  Respiratory: clear to auscultation bilaterally  CV: regular rate and rhythm, normal S1, S2  Abdomen: soft, nontender, nondistended, positive bowel sounds, last bowel movement   Extremities: warm, well perfused. no edema. + DP pulses  R SC permacath       @ 07:  -   @ 07:00  --------------------------------------------------------  IN: 30 mL / OUT: 700 mL / NET: -670 mL     @ 07:  -   @ 13:03  --------------------------------------------------------  IN: 240 mL / OUT: 0 mL / NET: 240 mL        Weights:  Daily Height in cm: 162.56 (23 Aug 2019 21:05)    Daily Weight in k (24 Aug 2019 10:00)  Admit Wt: Drug Dosing Weight  Height (cm): 162.6 (23 Aug 2019 21:05)  Weight (kg): 86.4 (20 Aug 2019 12:00)  BMI (kg/m2): 32.7 (23 Aug 2019 21:05)  BSA (m2): 1.92 (23 Aug 2019 21:05)    All laboratory results, radiology and medications reviewed.    LABS      136  |  97<L>  |  51.0<H>  ----------------------------<  138<H>  4.0   |  22.0  |  4.77<H>    Ca    6.9<L>      24 Aug 2019 06:32  Mg     1.9                                        12.0   12.50 )-----------( 227      ( 24 Aug 2019 06:32 )             38.1          PTT - ( 23 Aug 2019 05:46 )  PTT:65.2 sec    CAPILLARY BLOOD GLUCOSE      POCT Blood Glucose.: 153 mg/dL (24 Aug 2019 12:11)  POCT Blood Glucose.: 121 mg/dL (24 Aug 2019 08:19)  POCT Blood Glucose.: 127 mg/dL (23 Aug 2019 21:49)  POCT Blood Glucose.: 151 mg/dL (23 Aug 2019 21:00)  POCT Blood Glucose.: 127 mg/dL (23 Aug 2019 17:07)           Today's CXR:    Today's EKG:    PAST MEDICAL & SURGICAL HISTORY:  HLD (hyperlipidemia)  Cerebrovascular accident (CVA): Poor historian, unaware of date  DM2 (diabetes mellitus, type 2)  HTN (hypertension)  No significant past surgical history Subjective: "I feel ok" Denies CP, SOB. Seen while undergoing dialysis. Reports presented with SOB but is now improved.     VITAL SIGNS  Vital Signs Last 24 Hrs  T(C): 36.4 (19 @ 10:00), Max: 37.4 (19 @ 21:05)  T(F): 97.6 (19 @ 10:00), Max: 99.3 (19 @ 21:05)  HR: 54 (19 @ 10:00) (54 - 82)  BP: 135/57 (19 @ 10:00) (123/50 - 144/72)  RR: 18 (19 @ 10:00) (16 - 18)  SpO2: 100% (19 @ 10:00) (96% - 100%)  onRA            Telemetry/Alarms:  SR  with PACs PVCs  LVEF: 35%    MEDICATIONS  ALBUTerol/ipratropium for Nebulization. 3 milliLiter(s) Nebulizer once PRN  aspirin enteric coated 81 milliGRAM(s) Oral daily  atorvastatin 40 milliGRAM(s) Oral at bedtime  buDESOnide 160 MICROgram(s)/formoterol 4.5 MICROgram(s) Inhaler 2 Puff(s) Inhalation two times a day  calcium carbonate   1250 mG (OsCal) 2 Tablet(s) Oral two times a day  dextrose 40% Gel 15 Gram(s) Oral once PRN  dextrose 5%. 1000 milliLiter(s) IV Continuous <Continuous>  dextrose 50% Injectable 12.5 Gram(s) IV Push once  dextrose 50% Injectable 25 Gram(s) IV Push once  dextrose 50% Injectable 25 Gram(s) IV Push once  glucagon  Injectable 1 milliGRAM(s) IntraMuscular once PRN  heparin  Injectable 5000 Unit(s) SubCutaneous every 8 hours  insulin lispro (HumaLOG) corrective regimen sliding scale   SubCutaneous Before meals and at bedtime  insulin lispro Injectable (HumaLOG) 2 Unit(s) SubCutaneous three times a day before meals  metoprolol tartrate 25 milliGRAM(s) Oral every 12 hours  paricalcitol Injectable 2 MICROGram(s) IV Push <User Schedule>  sevelamer carbonate 800 milliGRAM(s) Oral three times a day with meals  sodium chloride 0.9% lock flush 3 milliLiter(s) IV Push every 8 hours      PHYSICAL EXAM  General: well nourished, well developed, no acute distress  Neurology: alert and oriented x 3, nonfocal, no gross deficits  Respiratory: clear to auscultation bilaterally  CV: regular rate and rhythm, normal S1, S2  Abdomen: soft, nontender, nondistended, positive bowel sounds   Extremities: warm, well perfused. no edema. + DP pulses  R SC permacath       @ 07:  -   @ 07:00  --------------------------------------------------------  IN: 30 mL / OUT: 700 mL / NET: -670 mL     @ :  -   @ 13:03  --------------------------------------------------------  IN: 240 mL / OUT: 0 mL / NET: 240 mL        Weights:  Daily Height in cm: 162.56 (23 Aug 2019 21:05)    Daily Weight in k (24 Aug 2019 10:00)  Admit Wt: Drug Dosing Weight  Height (cm): 162.6 (23 Aug 2019 21:05)  Weight (kg): 86.4 (20 Aug 2019 12:00)  BMI (kg/m2): 32.7 (23 Aug 2019 21:05)  BSA (m2): 1.92 (23 Aug 2019 21:05)    All laboratory results, radiology and medications reviewed.    LABS      136  |  97<L>  |  51.0<H>  ----------------------------<  138<H>  4.0   |  22.0  |  4.77<H>    Ca    6.9<L>      24 Aug 2019 06:32  Mg     1.9                                        12.0   12.50 )-----------( 227      ( 24 Aug 2019 06:32 )             38.1          PTT - ( 23 Aug 2019 05:46 )  PTT:65.2 sec    CAPILLARY BLOOD GLUCOSE      POCT Blood Glucose.: 153 mg/dL (24 Aug 2019 12:11)  POCT Blood Glucose.: 121 mg/dL (24 Aug 2019 08:19)  POCT Blood Glucose.: 127 mg/dL (23 Aug 2019 21:49)  POCT Blood Glucose.: 151 mg/dL (23 Aug 2019 21:00)  POCT Blood Glucose.: 127 mg/dL (23 Aug 2019 17:07)           Last CXR: clear lungs   < from: Xray Chest 1 View- PORTABLE-Routine (19 @ 05:05) >    Single frontal view of the chest demonstrates the lungs to be clear. The   cardiomediastinal silhouette is normal. No acute osseous abnormalities.   Overlying EKG leads and wires are noted. Right-sided dialysis catheter is   redemonstrated.    IMPRESSION: No acute cardiopulmonary disease process.    < end of copied text >    Today's EKG: < from: 12 Lead ECG (19 @ 05:19) >    Diagnosis Line Sinus rhythm withfrequent Premature ventricular complexes -ventricular quadrigeminy  -lahb  Minimal voltage criteria for LVH, may be normal variant  Septal infarct , age undetermined  ST & T wave abnormality, consider inferior ischemia  ST & T wave abnormality, consider anterolateral ischemia  Prolonged QT  Abnormal ECG    < end of copied text >      PAST MEDICAL & SURGICAL HISTORY:  HLD (hyperlipidemia)  Cerebrovascular accident (CVA): Poor historian, unaware of date  DM2 (diabetes mellitus, type 2)  HTN (hypertension)  No significant past surgical history

## 2019-08-24 NOTE — PROGRESS NOTE ADULT - PROBLEM SELECTOR PLAN 8
on full dose heparin infusion and SCDs for DVT prophylaxis  Protonix for GI prophylaxis on subcutaneous heparin and SCDs for DVT prophylaxis  Protonix for GI prophylaxis

## 2019-08-24 NOTE — PROGRESS NOTE ADULT - ASSESSMENT
59 year old male with CKD on HD getting dialyzed via R IJ permacath tolerating well after permacath was flushed with tPa yesterday    -continue scheduled HD sessions via permacath  -plan for left upper extremity AV fistula on 8/26 with Dr. Roberts  -please preserve Left upper extremity for OR. no blood draws or BP cuffs  -rest of care as per primary team

## 2019-08-24 NOTE — PROGRESS NOTE ADULT - PROBLEM SELECTOR PLAN 4
Hold Metformin-Elevated BUN/Cr  FS AC+HS with coverage  endocrine consult appreciated, humalog added, will likely need basal insulin started for discharge Hold Metformin-Elevated BUN/Cr  FS AC+HS with coverage  Endocrine consult appreciated, humalog added, may need basal insulin started for discharge + Januvia 25 mg per endocrine, however is fairly controlled on low dose premeal humalog at this point. Possible to try only januvia 25 mg on discharge?

## 2019-08-24 NOTE — PROGRESS NOTE ADULT - SUBJECTIVE AND OBJECTIVE BOX
Patient was seen and evaluated on dialysis.   No c/o CP SOB NV  no F/C  + swelling    T(C): 36.4 (08-24-19 @ 10:00), Max: 37.4 (08-23-19 @ 21:05)  HR: 54 (08-24-19 @ 10:00) (54 - 82)  BP: 135/57 (08-24-19 @ 10:00) (123/50 - 144/72)    PE ;  NAD , Pale,   lungs - CTA  CV gr 3/6  murmur,  No gallop or rub  Abd : soft, NT BS +, No masses  Ext- No edema  Neuro : Grossly intact, moving extremities                       12.0   12.50 )-----------( 227      ( 24 Aug 2019 06:32 )             38.1     08-24    136  |  97<L>  |  51.0<H>  ----------------------------<  138<H>  4.0   |  22.0  |  4.77<H>    Ca    6.9<L>      24 Aug 2019 06:32  Mg     1.9     08-24    MEDICATIONS  (STANDING):  ALBUTerol/ipratropium for Nebulization. PRN  aspirin enteric coated  atorvastatin  buDESOnide 160 MICROgram(s)/formoterol 4.5 MICROgram(s) Inhaler  calcium carbonate   1250 mG (OsCal)  dextrose 40% Gel PRN  dextrose 5%.  dextrose 50% Injectable  dextrose 50% Injectable  dextrose 50% Injectable  glucagon  Injectable PRN  heparin  Injectable  insulin lispro (HumaLOG) corrective regimen sliding scale  insulin lispro Injectable (HumaLOG)  metoprolol tartrate  paricalcitol Injectable  sevelamer carbonate  sodium chloride 0.9% lock flush      Patient stable  [facility] HD easily, Qb 350 ml.,   Continue HD on Tuesday,    AVF on Monday,  Elective CABG  @ a Later Day,     OP HD Arrangements in Progress,     D/W Alice ( [facility] )

## 2019-08-24 NOTE — PROGRESS NOTE ADULT - SUBJECTIVE AND OBJECTIVE BOX
No acute events overnight. Undergoing HD via R IJ permacath during evaluation. No complaints of pain at permacath site. tolerating diet.   denies fevers, chills, nausea, vomiting, chest pain, shortness of breath       MEDICATIONS  (STANDING):  aspirin enteric coated 81 milliGRAM(s) Oral daily  atorvastatin 40 milliGRAM(s) Oral at bedtime  buDESOnide 160 MICROgram(s)/formoterol 4.5 MICROgram(s) Inhaler 2 Puff(s) Inhalation two times a day  calcium carbonate   1250 mG (OsCal) 2 Tablet(s) Oral two times a day  dextrose 5%. 1000 milliLiter(s) (50 mL/Hr) IV Continuous <Continuous>  dextrose 50% Injectable 12.5 Gram(s) IV Push once  dextrose 50% Injectable 25 Gram(s) IV Push once  dextrose 50% Injectable 25 Gram(s) IV Push once  heparin  Injectable 5000 Unit(s) SubCutaneous every 8 hours  insulin lispro (HumaLOG) corrective regimen sliding scale   SubCutaneous Before meals and at bedtime  insulin lispro Injectable (HumaLOG) 2 Unit(s) SubCutaneous three times a day before meals  metoprolol tartrate 25 milliGRAM(s) Oral every 12 hours  paricalcitol Injectable 2 MICROGram(s) IV Push <User Schedule>  sevelamer carbonate 800 milliGRAM(s) Oral three times a day with meals  sodium chloride 0.9% lock flush 3 milliLiter(s) IV Push every 8 hours    MEDICATIONS  (PRN):  ALBUTerol/ipratropium for Nebulization. 3 milliLiter(s) Nebulizer once PRN Shortness of Breath  dextrose 40% Gel 15 Gram(s) Oral once PRN Blood Glucose LESS THAN 70 milliGRAM(s)/deciliter  glucagon  Injectable 1 milliGRAM(s) IntraMuscular once PRN Glucose LESS THAN 70 milligrams/deciliter      Vital Signs Last 24 Hrs  T(C): 36.4 (24 Aug 2019 10:00), Max: 37.4 (23 Aug 2019 21:05)  T(F): 97.6 (24 Aug 2019 10:00), Max: 99.3 (23 Aug 2019 21:05)  HR: 54 (24 Aug 2019 10:00) (54 - 82)  BP: 135/57 (24 Aug 2019 10:00) (123/50 - 144/72)  BP(mean): --  RR: 18 (24 Aug 2019 10:00) (16 - 18)  SpO2: 100% (24 Aug 2019 10:00) (96% - 100%)    Physical Exam:    general: no acute distress, aoX3  Respiratory: Breath Sounds equal & clear to auscultation, no accessory muscle use  Cardiovascular: Regular rate & rhythm, normal S1, S2; no murmurs, gallops or rubs  Gastrointestinal: Soft, non-tender, normal bowel sounds  Extremities: No peripheral edema, No cyanosis, clubbing. motor/sensation grossly intact    Vascular: 2+radial pulses bilaterally. extremities warm to touch    Musculoskeletal: No joint pain, swelling or deformity; no limitation of movement    Skin:no erythema, drainage or swelling at right IJ permacath site.       I&O's Detail    23 Aug 2019 07:01  -  24 Aug 2019 07:00  --------------------------------------------------------  IN:    Oral Fluid: 30 mL  Total IN: 30 mL    OUT:    Voided: 700 mL  Total OUT: 700 mL    Total NET: -670 mL      24 Aug 2019 07:01  -  24 Aug 2019 12:21  --------------------------------------------------------  IN:    Oral Fluid: 240 mL  Total IN: 240 mL    OUT:  Total OUT: 0 mL    Total NET: 240 mL          LABS:                        12.0   12.50 )-----------( 227      ( 24 Aug 2019 06:32 )             38.1     08-24    136  |  97<L>  |  51.0<H>  ----------------------------<  138<H>  4.0   |  22.0  |  4.77<H>    Ca    6.9<L>      24 Aug 2019 06:32  Mg     1.9     08-24      PTT - ( 23 Aug 2019 05:46 )  PTT:65.2 sec      RADIOLOGY & ADDITIONAL STUDIES:

## 2019-08-24 NOTE — PROGRESS NOTE ADULT - SUBJECTIVE AND OBJECTIVE BOX
60y/o AA M, active smoker, with PMH: of DM2, HTN, prior CVA ,    8/17: presented to [facility] [place] w/ c/o sudden onset of SOB with concurrent chest discomfort while performing yard work. Admitted with HTN emergency, acute on CKD  + NSTEMI  8/20 transferred from [facility] [place] after subsequent LHC revealed multivessel CAD (dLAD 90%, D1 80%, pCXF 70%, mCXF 70%, OM1 80%, mRCA, 95%, RPDLS 70%) for which pt will need CABG.     During HD ( # 2 )  Had an episode of intra Dialytic Hypotension, UF D.C , @ [employer] [facility],     AVF on Monday 8/26 planned,     ·> CAD (coronary artery disease).      Plan: 8/20: Transferred from [facility] [place] for CABG, [facility] Complete - Elective Surgery,     ·>ESRD - HD ( T Th S )       ·> Acute combined systolic and diastolic heart failure.       ·> DM2 (diabetes mellitus, type 2).      FS AC+ HS with coverage ( Humalog )    ·>COPD, HTN - on BB, Nebulizers & Statin,      AVF On Monday,    D/W Alice ( CTS )

## 2019-08-24 NOTE — PROGRESS NOTE ADULT - ASSESSMENT
60y/o M, active smoker, with PMHx of DM2, HTN, HLD, prior CVA (Poor historian-Unclear of time, Reports no sensory/motor deficits), sciatica (Intermit Lt lower ext numbness), history of medication non-compliance.   8/17: presented to [facility] w/ c/o sudden onset of SOB with concurrent chest discomfort while performing yard work. Admitted with HTN emergency, acute on CKD. + NSTEMI  8/20 transferred from [facility] after subsequent LHC revealed multivessel CAD (dLAD 90%, D1 80%, pCXF 70%, mCXF 70%, OM1 80%, mRCA, 95%, RPDLS 70%) for which pt will need CABG. During HD at [facility], pt noted to be obtunded, found to be hypotensive, IVF resuscitation given, BP improved, and mental status returned to baseline.  8/21 dialysis for clearance only, no fluid removed. Vascular surgery consulted (R chest wall permacath may need to be removed d/t non-occlusive thrombus seen, preserve LUE for eventual AVF).  8/22 tentative AVF on Monday 8/26 with vascular 60y/o M, active smoker, with PMHx of DM2, HTN, HLD, prior CVA (Poor historian-Unclear of time, Reports no sensory/motor deficits), sciatica (Intermit Lt lower ext numbness), history of medication non-compliance.   8/17: [facility] w/ c/o sudden onset of SOB with concurrent chest discomfort while performing yard work. Admitted with HTN emergency, acute on CKD. + NSTEMI  8/20 [facility] after subsequent LHC revealed multivessel CAD (dLAD 90%, D1 80%, pCXF 70%, mCXF 70%, OM1 80%, mRCA, 95%, RPDLS 70%) for which pt will need CABG.     During HD at [facility], pt noted to be obtunded, found to be hypotensive, IVF resuscitation given, BP improved, and mental status returned to baseline.  8/21 dialysis for clearance only, no fluid removed. Vascular surgery consulted (R chest wall permacath may need to be removed d/t non-occlusive thrombus seen, preserve LUE for eventual AVF).  8/22 tentative AVF on Monday 8/26 with vascular

## 2019-08-25 LAB
BLD GP AB SCN SERPL QL: SIGNIFICANT CHANGE UP
GLUCOSE BLDC GLUCOMTR-MCNC: 128 MG/DL — HIGH (ref 70–99)
GLUCOSE BLDC GLUCOMTR-MCNC: 134 MG/DL — HIGH (ref 70–99)
GLUCOSE BLDC GLUCOMTR-MCNC: 148 MG/DL — HIGH (ref 70–99)
HCT VFR BLD CALC: 37.7 % — LOW (ref 39–50)
HGB BLD-MCNC: 11.8 G/DL — LOW (ref 13–17)
MCHC RBC-ENTMCNC: 26 PG — LOW (ref 27–34)
MCHC RBC-ENTMCNC: 31.3 GM/DL — LOW (ref 32–36)
MCV RBC AUTO: 83.2 FL — SIGNIFICANT CHANGE UP (ref 80–100)
PLATELET # BLD AUTO: 238 K/UL — SIGNIFICANT CHANGE UP (ref 150–400)
RBC # BLD: 4.53 M/UL — SIGNIFICANT CHANGE UP (ref 4.2–5.8)
RBC # FLD: 13.8 % — SIGNIFICANT CHANGE UP (ref 10.3–14.5)
WBC # BLD: 12.45 K/UL — HIGH (ref 3.8–10.5)
WBC # FLD AUTO: 12.45 K/UL — HIGH (ref 3.8–10.5)

## 2019-08-25 PROCEDURE — 99232 SBSQ HOSP IP/OBS MODERATE 35: CPT

## 2019-08-25 PROCEDURE — 99233 SBSQ HOSP IP/OBS HIGH 50: CPT

## 2019-08-25 RX ADMIN — Medication 2 UNIT(S): at 12:57

## 2019-08-25 RX ADMIN — SODIUM CHLORIDE 3 MILLILITER(S): 9 INJECTION INTRAMUSCULAR; INTRAVENOUS; SUBCUTANEOUS at 22:34

## 2019-08-25 RX ADMIN — Medication 81 MILLIGRAM(S): at 13:14

## 2019-08-25 RX ADMIN — SEVELAMER CARBONATE 800 MILLIGRAM(S): 2400 POWDER, FOR SUSPENSION ORAL at 08:45

## 2019-08-25 RX ADMIN — SEVELAMER CARBONATE 800 MILLIGRAM(S): 2400 POWDER, FOR SUSPENSION ORAL at 13:14

## 2019-08-25 RX ADMIN — Medication 25 MILLIGRAM(S): at 17:15

## 2019-08-25 RX ADMIN — HEPARIN SODIUM 5000 UNIT(S): 5000 INJECTION INTRAVENOUS; SUBCUTANEOUS at 13:14

## 2019-08-25 RX ADMIN — BUDESONIDE AND FORMOTEROL FUMARATE DIHYDRATE 2 PUFF(S): 160; 4.5 AEROSOL RESPIRATORY (INHALATION) at 20:54

## 2019-08-25 RX ADMIN — SODIUM CHLORIDE 3 MILLILITER(S): 9 INJECTION INTRAMUSCULAR; INTRAVENOUS; SUBCUTANEOUS at 05:10

## 2019-08-25 RX ADMIN — Medication 25 MILLIGRAM(S): at 05:14

## 2019-08-25 RX ADMIN — SODIUM CHLORIDE 3 MILLILITER(S): 9 INJECTION INTRAMUSCULAR; INTRAVENOUS; SUBCUTANEOUS at 13:14

## 2019-08-25 RX ADMIN — ATORVASTATIN CALCIUM 40 MILLIGRAM(S): 80 TABLET, FILM COATED ORAL at 22:38

## 2019-08-25 RX ADMIN — HEPARIN SODIUM 5000 UNIT(S): 5000 INJECTION INTRAVENOUS; SUBCUTANEOUS at 05:14

## 2019-08-25 RX ADMIN — Medication 2 TABLET(S): at 17:14

## 2019-08-25 RX ADMIN — Medication 2 UNIT(S): at 17:14

## 2019-08-25 RX ADMIN — SEVELAMER CARBONATE 800 MILLIGRAM(S): 2400 POWDER, FOR SUSPENSION ORAL at 17:14

## 2019-08-25 RX ADMIN — BUDESONIDE AND FORMOTEROL FUMARATE DIHYDRATE 2 PUFF(S): 160; 4.5 AEROSOL RESPIRATORY (INHALATION) at 08:46

## 2019-08-25 RX ADMIN — Medication 2 TABLET(S): at 05:14

## 2019-08-25 NOTE — PROGRESS NOTE ADULT - ASSESSMENT
58y/o M, active smoker, with PMHx of DM2, HTN, HLD, prior CVA (Poor historian-Unclear of time, Reports no sensory/motor deficits), sciatica (Intermit Lt lower ext numbness), history of medication non-compliance.   8/17: presented to [place] w/ c/o sudden onset of SOB with concurrent chest discomfort while performing yard work. Admitted with HTN emergency, acute on CKD. + NSTEMI  8/20 transferred from [place] after subsequent LHC revealed multivessel CAD (dLAD 90%, D1 80%, pCXF 70%, mCXF 70%, OM1 80%, mRCA, 95%, RPDLS 70%) for which pt will need CABG.     During HD at [facility], pt noted to be obtunded, found to be hypotensive, IVF resuscitation given, BP improved, and mental status returned to baseline.  8/21 dialysis for clearance only, no fluid removed. Vascular surgery consulted (R chest wall permacath may need to be removed d/t non-occlusive thrombus seen, preserve LUE for eventual AVF).  8/22 tentative AVF on Monday 8/26 with vascular

## 2019-08-25 NOTE — PROGRESS NOTE ADULT - ASSESSMENT
59 year old male with CKD on HD sp full HD on 8/24 via R IJ permacath tolerated well after permacath was flushed with tPa on 8/23    -continue scheduled HD sessions via permacath  -plan for left upper extremity AV fistula on 8/26 with Dr. Roberts  -please preserve Left upper extremity for OR. no blood draws or BP cuffs  -rest of care as per primary team

## 2019-08-25 NOTE — PROGRESS NOTE ADULT - SUBJECTIVE AND OBJECTIVE BOX
Patient seen and examined    I&O's Summary    24 Aug 2019 07:01  -  25 Aug 2019 07:00  --------------------------------------------------------  IN: 240 mL / OUT: 850 mL / NET: -610 mL    25 Aug 2019 07:01  -  25 Aug 2019 14:17  --------------------------------------------------------  IN: 0 mL / OUT: 900 mL / NET: -900 mL    REVIEW OF SYSTEMS:    CONSTITUTIONAL: No F/C  RESPIRATORY: No cough or SOB  CARDIOVASCULAR: No CP/palpitations,    GASTROINTESTINAL: No abdominal pain , NVD   GENITOURINARY: No UTI sx  NEUROLOGICAL: No headaches/wk/numbness  MUSCULOSKELETAL:  No joint pain/swelling; No LBP  EXTREMITIES : no swelling,    Vital Signs Last 24 Hrs  T(C): 37.2 (25 Aug 2019 12:00), Max: 37.2 (25 Aug 2019 12:00)  T(F): 98.9 (25 Aug 2019 12:00), Max: 98.9 (25 Aug 2019 12:00)  HR: 68 (25 Aug 2019 12:00) (63 - 81)  BP: 138/62 (25 Aug 2019 12:00) (118/64 - 138/62)  RR: 16 (25 Aug 2019 12:00) (16 - 18)  SpO2: 87% (25 Aug 2019 12:00) (87% - 100%)    PHYSICAL EXAM:    GENERAL: NAD,   EYES:  conjunctiva and sclera clear  NECK: Supple, No JVD/Bruit  NERVOUS SYSTEM:  A/O x3,   CHEST:  CTA ,No rales or rhonchi  HEART:  RRR, No murmurs  ABDOMEN: Soft, NT/ND BS+  EXTREMITIES:  No Edema;  SKIN: No rashes    LABS:                        11.8   12.45 )-----------( 238      ( 25 Aug 2019 06:49 )             37.7     08-24    136  |  97<L>  |  51.0<H>  ----------------------------<  138<H>  4.0   |  22.0  |  4.77<H>    Ca    6.9<L>      24 Aug 2019 06:32  Mg     1.9     08-24    MEDICATIONS  (STANDING):  ALBUTerol/ipratropium for Nebulization. PRN  aspirin enteric coated  atorvastatin  buDESOnide 160 MICROgram(s)/formoterol 4.5 MICROgram(s) Inhaler  calcium carbonate   1250 mG (OsCal)  dextrose 40% Gel PRN  dextrose 5%.  dextrose 50% Injectable  dextrose 50% Injectable  dextrose 50% Injectable  glucagon  Injectable PRN  heparin  Injectable  insulin lispro (HumaLOG) corrective regimen sliding scale  insulin lispro Injectable (HumaLOG)  metoprolol tartrate  paricalcitol Injectable  sevelamer carbonate  sodium chloride 0.9% lock flush

## 2019-08-25 NOTE — PROGRESS NOTE ADULT - PROBLEM SELECTOR PLAN 1
8/20: Transferred from [facility] [place] for CABG workup  likely d/c home when optimized/seat available and return as outpt for elective surgery  asa, lipitor and lopressor

## 2019-08-25 NOTE — PROGRESS NOTE ADULT - SUBJECTIVE AND OBJECTIVE BOX
HPI/OVERNIGHT EVENTS: Patient seen and examined at bedside. Patient tolerated HD via RIJ permcath yesterday. Permcath flushed with TPA after early termination of HD on 8/23. No pain at permcath site. Denies any other concerning symptoms. patient remains afebrile with stable vital signs.    Vital Signs Last 24 Hrs  T(C): 37.1 (24 Aug 2019 21:49), Max: 37.1 (24 Aug 2019 15:29)  T(F): 98.8 (24 Aug 2019 21:49), Max: 98.8 (24 Aug 2019 15:29)  HR: 68 (24 Aug 2019 21:49) (54 - 81)  BP: 118/64 (24 Aug 2019 21:49) (118/64 - 150/46)  BP(mean): --  RR: 16 (24 Aug 2019 21:49) (16 - 18)  SpO2: 100% (24 Aug 2019 21:49) (96% - 100%)    I&O's Detail    23 Aug 2019 07:01  -  24 Aug 2019 07:00  --------------------------------------------------------  IN:    Oral Fluid: 30 mL  Total IN: 30 mL    OUT:    Voided: 700 mL  Total OUT: 700 mL    Total NET: -670 mL      24 Aug 2019 07:01  -  25 Aug 2019 00:33  --------------------------------------------------------  IN:    Oral Fluid: 240 mL  Total IN: 240 mL    OUT:    Other: 500 mL    Voided: 350 mL  Total OUT: 850 mL    Total NET: -610 mL      general: no acute distress, aoX3  Respiratory: non labored, no accessory muscle use  Cardiovascular: Regular rate & rhythm  Gastrointestinal: Soft, non-tender  Extremities: No peripheral edema, No cyanosis, clubbing. motor/sensation grossly intact  Vascular: extremities warm to touch and well perfused  Musculoskeletal: No joint pain, swelling or deformity; no limitation of movement  Skin: no erythema, drainage or swelling at right IJ permacath site.     LABS:                        12.0   12.50 )-----------( 227      ( 24 Aug 2019 06:32 )             38.1     08-24    136  |  97<L>  |  51.0<H>  ----------------------------<  138<H>  4.0   |  22.0  |  4.77<H>    Ca    6.9<L>      24 Aug 2019 06:32  Mg     1.9     08-24      PTT - ( 23 Aug 2019 05:46 )  PTT:65.2 sec      MEDICATIONS  (STANDING):  aspirin enteric coated 81 milliGRAM(s) Oral daily  atorvastatin 40 milliGRAM(s) Oral at bedtime  buDESOnide 160 MICROgram(s)/formoterol 4.5 MICROgram(s) Inhaler 2 Puff(s) Inhalation two times a day  calcium carbonate   1250 mG (OsCal) 2 Tablet(s) Oral two times a day  dextrose 5%. 1000 milliLiter(s) (50 mL/Hr) IV Continuous <Continuous>  dextrose 50% Injectable 12.5 Gram(s) IV Push once  dextrose 50% Injectable 25 Gram(s) IV Push once  dextrose 50% Injectable 25 Gram(s) IV Push once  heparin  Injectable 5000 Unit(s) SubCutaneous every 8 hours  insulin lispro (HumaLOG) corrective regimen sliding scale   SubCutaneous Before meals and at bedtime  insulin lispro Injectable (HumaLOG) 2 Unit(s) SubCutaneous three times a day before meals  metoprolol tartrate 25 milliGRAM(s) Oral every 12 hours  paricalcitol Injectable 2 MICROGram(s) IV Push <User Schedule>  sevelamer carbonate 800 milliGRAM(s) Oral three times a day with meals  sodium chloride 0.9% lock flush 3 milliLiter(s) IV Push every 8 hours    MEDICATIONS  (PRN):  ALBUTerol/ipratropium for Nebulization. 3 milliLiter(s) Nebulizer once PRN Shortness of Breath  dextrose 40% Gel 15 Gram(s) Oral once PRN Blood Glucose LESS THAN 70 milliGRAM(s)/deciliter  glucagon  Injectable 1 milliGRAM(s) IntraMuscular once PRN Glucose LESS THAN 70 milligrams/deciliter

## 2019-08-25 NOTE — PROGRESS NOTE ADULT - ASSESSMENT
60y/o M, active smoker, with PMHx of DM2, HTN, HLD, prior CVA (Poor historian-Unclear of time, Reports no sensory/motor deficits), sciatica (Intermit Lt lower ext numbness), history of medication non-compliance.   8/17: presented to [facility] [place] w/ c/o sudden onset of SOB with concurrent chest discomfort while performing yard work. Admitted with HTN emergency, acute on CKD. + NSTEMI  8/20 transferred from [facility] [place] after subsequent LHC revealed multivessel CAD (dLAD 90%, D1 80%, pCXF 70%, mCXF 70%, OM1 80%, mRCA, 95%, RPDLS 70%) for which pt will need CABG.     ·  CAD (coronary artery disease).  Plan: 8/20: Transferred from [facility] [place] for CABG workup      ·  ESRD - HD on T Th S,     ·  Acute combined systolic and diastolic heart failure.  Plan: unclear if acute (likely is chronic though no baseline to compare to )      ·  DM2 (diabetes mellitus, type 2,    D.C Metformin,     FS AC+ HS with coverage          HTN (hypertension). Plan: titrate lopressor,    AVF in AM,

## 2019-08-25 NOTE — PROGRESS NOTE ADULT - SUBJECTIVE AND OBJECTIVE BOX
Subjective:  "Im ok, just tired, they wake me up too early"  Supine in bed    Tele:      SR   60s                          T(C): 36.9 (08-25-19 @ 05:11), Max: 37.1 (08-24-19 @ 15:29)  HR: 67 (08-25-19 @ 05:11) (63 - 81)  BP: 120/70 (08-25-19 @ 05:11) (118/64 - 130/56)  RR: 16 (08-25-19 @ 05:11) (16 - 18)  SpO2: 100% (08-25-19 @ 05:11) (97% - 100%)    LVEF: 35%      08-24    136  |  97<L>  |  51.0<H>  ----------------------------<  138<H>  4.0   |  22.0  |  4.77<H>    Ca    6.9<L>      24 Aug 2019 06:32  Mg     1.9     08-24                                 11.8   12.45 )-----------( 238      ( 25 Aug 2019 06:49 )             37.7            CAPILLARY BLOOD GLUCOSE      POCT Blood Glucose.: 128 mg/dL (25 Aug 2019 08:31)  POCT Blood Glucose.: 181 mg/dL (24 Aug 2019 21:30)  POCT Blood Glucose.: 110 mg/dL (24 Aug 2019 17:05)      Assessment    General: well nourished, well developed, no acute distress  Neurology: alert and oriented x 3, nonfocal, no gross deficits  Respiratory: clear to auscultation bilaterally  CV: regular rate and rhythm, normal S1, S2  Abdomen: soft, nontender, nondistended, positive bowel sounds   Extremities: warm, well perfused. no edema. + DP pulses  R SC permacath        MEDICATIONS  (STANDING):  aspirin enteric coated 81 milliGRAM(s) Oral daily  atorvastatin 40 milliGRAM(s) Oral at bedtime  buDESOnide 160 MICROgram(s)/formoterol 4.5 MICROgram(s) Inhaler 2 Puff(s) Inhalation two times a day  calcium carbonate   1250 mG (OsCal) 2 Tablet(s) Oral two times a day  dextrose 5%. 1000 milliLiter(s) (50 mL/Hr) IV Continuous <Continuous>  dextrose 50% Injectable 12.5 Gram(s) IV Push once  dextrose 50% Injectable 25 Gram(s) IV Push once  dextrose 50% Injectable 25 Gram(s) IV Push once  heparin  Injectable 5000 Unit(s) SubCutaneous every 8 hours  insulin lispro (HumaLOG) corrective regimen sliding scale   SubCutaneous Before meals and at bedtime  insulin lispro Injectable (HumaLOG) 2 Unit(s) SubCutaneous three times a day before meals  metoprolol tartrate 25 milliGRAM(s) Oral every 12 hours  paricalcitol Injectable 2 MICROGram(s) IV Push <User Schedule>  sevelamer carbonate 800 milliGRAM(s) Oral three times a day with meals  sodium chloride 0.9% lock flush 3 milliLiter(s) IV Push every 8 hours       PAST MEDICAL & SURGICAL HISTORY:  HLD (hyperlipidemia)  Cerebrovascular accident (CVA): Poor historian, unaware of date  DM2 (diabetes mellitus, type 2)  HTN (hypertension)  No significant past surgical history

## 2019-08-25 NOTE — PROGRESS NOTE ADULT - PROBLEM SELECTOR PLAN 4
Hold Metformin-Elevated BUN/Cr  FS AC+HS with coverage  Endocrine consult appreciated, humalog added, may need basal insulin started for discharge + Januvia 25 mg per endocrine, however is fairly controlled on low dose premeal humalog at this point. Possible to try only januvia 25 mg on discharge?

## 2019-08-26 LAB
ALBUMIN SERPL ELPH-MCNC: 3.1 G/DL — LOW (ref 3.3–5.2)
ALP SERPL-CCNC: 92 U/L — SIGNIFICANT CHANGE UP (ref 40–120)
ALT FLD-CCNC: 19 U/L — SIGNIFICANT CHANGE UP
ANION GAP SERPL CALC-SCNC: 14 MMOL/L — SIGNIFICANT CHANGE UP (ref 5–17)
APTT BLD: 28.7 SEC — SIGNIFICANT CHANGE UP (ref 27.5–36.3)
AST SERPL-CCNC: 16 U/L — SIGNIFICANT CHANGE UP
BILIRUB SERPL-MCNC: 0.2 MG/DL — LOW (ref 0.4–2)
BLD GP AB SCN SERPL QL: SIGNIFICANT CHANGE UP
BUN SERPL-MCNC: 54 MG/DL — HIGH (ref 8–20)
CALCIUM SERPL-MCNC: 8 MG/DL — LOW (ref 8.6–10.2)
CHLORIDE SERPL-SCNC: 98 MMOL/L — SIGNIFICANT CHANGE UP (ref 98–107)
CO2 SERPL-SCNC: 24 MMOL/L — SIGNIFICANT CHANGE UP (ref 22–29)
CREAT SERPL-MCNC: 5.12 MG/DL — HIGH (ref 0.5–1.3)
GLUCOSE BLDC GLUCOMTR-MCNC: 116 MG/DL — HIGH (ref 70–99)
GLUCOSE BLDC GLUCOMTR-MCNC: 128 MG/DL — HIGH (ref 70–99)
GLUCOSE BLDC GLUCOMTR-MCNC: 139 MG/DL — HIGH (ref 70–99)
GLUCOSE BLDC GLUCOMTR-MCNC: 165 MG/DL — HIGH (ref 70–99)
GLUCOSE SERPL-MCNC: 145 MG/DL — HIGH (ref 70–115)
HCT VFR BLD CALC: 37.7 % — LOW (ref 39–50)
HGB BLD-MCNC: 11.7 G/DL — LOW (ref 13–17)
INR BLD: 1.01 RATIO — SIGNIFICANT CHANGE UP (ref 0.88–1.16)
MAGNESIUM SERPL-MCNC: 1.9 MG/DL — SIGNIFICANT CHANGE UP (ref 1.6–2.6)
MCHC RBC-ENTMCNC: 25.6 PG — LOW (ref 27–34)
MCHC RBC-ENTMCNC: 31 GM/DL — LOW (ref 32–36)
MCV RBC AUTO: 82.5 FL — SIGNIFICANT CHANGE UP (ref 80–100)
PHOSPHATE SERPL-MCNC: 5 MG/DL — HIGH (ref 2.4–4.7)
PLATELET # BLD AUTO: 252 K/UL — SIGNIFICANT CHANGE UP (ref 150–400)
POTASSIUM SERPL-MCNC: 3.9 MMOL/L — SIGNIFICANT CHANGE UP (ref 3.5–5.3)
POTASSIUM SERPL-SCNC: 3.9 MMOL/L — SIGNIFICANT CHANGE UP (ref 3.5–5.3)
PROT SERPL-MCNC: 6.7 G/DL — SIGNIFICANT CHANGE UP (ref 6.6–8.7)
PROTHROM AB SERPL-ACNC: 11.6 SEC — SIGNIFICANT CHANGE UP (ref 10–12.9)
RBC # BLD: 4.57 M/UL — SIGNIFICANT CHANGE UP (ref 4.2–5.8)
RBC # FLD: 13.7 % — SIGNIFICANT CHANGE UP (ref 10.3–14.5)
SODIUM SERPL-SCNC: 136 MMOL/L — SIGNIFICANT CHANGE UP (ref 135–145)
WBC # BLD: 12.48 K/UL — HIGH (ref 3.8–10.5)
WBC # FLD AUTO: 12.48 K/UL — HIGH (ref 3.8–10.5)

## 2019-08-26 PROCEDURE — 99232 SBSQ HOSP IP/OBS MODERATE 35: CPT

## 2019-08-26 PROCEDURE — 99233 SBSQ HOSP IP/OBS HIGH 50: CPT

## 2019-08-26 PROCEDURE — 99231 SBSQ HOSP IP/OBS SF/LOW 25: CPT

## 2019-08-26 PROCEDURE — 36818 AV FUSE UPPR ARM CEPHALIC: CPT

## 2019-08-26 RX ORDER — ONDANSETRON 8 MG/1
4 TABLET, FILM COATED ORAL ONCE
Refills: 0 | Status: DISCONTINUED | OUTPATIENT
Start: 2019-08-26 | End: 2019-08-26

## 2019-08-26 RX ORDER — SODIUM CHLORIDE 9 MG/ML
1000 INJECTION, SOLUTION INTRAVENOUS
Refills: 0 | Status: DISCONTINUED | OUTPATIENT
Start: 2019-08-26 | End: 2019-08-26

## 2019-08-26 RX ORDER — FENTANYL CITRATE 50 UG/ML
50 INJECTION INTRAVENOUS
Refills: 0 | Status: DISCONTINUED | OUTPATIENT
Start: 2019-08-26 | End: 2019-08-26

## 2019-08-26 RX ORDER — MAGNESIUM SULFATE 500 MG/ML
2 VIAL (ML) INJECTION ONCE
Refills: 0 | Status: COMPLETED | OUTPATIENT
Start: 2019-08-26 | End: 2019-08-26

## 2019-08-26 RX ADMIN — ATORVASTATIN CALCIUM 40 MILLIGRAM(S): 80 TABLET, FILM COATED ORAL at 23:44

## 2019-08-26 RX ADMIN — Medication 2 UNIT(S): at 18:56

## 2019-08-26 RX ADMIN — SEVELAMER CARBONATE 800 MILLIGRAM(S): 2400 POWDER, FOR SUSPENSION ORAL at 18:50

## 2019-08-26 RX ADMIN — Medication 2 TABLET(S): at 18:51

## 2019-08-26 RX ADMIN — Medication 81 MILLIGRAM(S): at 09:11

## 2019-08-26 RX ADMIN — BUDESONIDE AND FORMOTEROL FUMARATE DIHYDRATE 2 PUFF(S): 160; 4.5 AEROSOL RESPIRATORY (INHALATION) at 08:43

## 2019-08-26 RX ADMIN — Medication 25 MILLIGRAM(S): at 06:25

## 2019-08-26 RX ADMIN — SEVELAMER CARBONATE 800 MILLIGRAM(S): 2400 POWDER, FOR SUSPENSION ORAL at 12:31

## 2019-08-26 RX ADMIN — Medication 25 MILLIGRAM(S): at 18:50

## 2019-08-26 RX ADMIN — SEVELAMER CARBONATE 800 MILLIGRAM(S): 2400 POWDER, FOR SUSPENSION ORAL at 09:11

## 2019-08-26 RX ADMIN — SODIUM CHLORIDE 3 MILLILITER(S): 9 INJECTION INTRAMUSCULAR; INTRAVENOUS; SUBCUTANEOUS at 22:03

## 2019-08-26 RX ADMIN — SODIUM CHLORIDE 3 MILLILITER(S): 9 INJECTION INTRAMUSCULAR; INTRAVENOUS; SUBCUTANEOUS at 19:00

## 2019-08-26 RX ADMIN — Medication 0: at 18:55

## 2019-08-26 RX ADMIN — SODIUM CHLORIDE 3 MILLILITER(S): 9 INJECTION INTRAMUSCULAR; INTRAVENOUS; SUBCUTANEOUS at 06:24

## 2019-08-26 RX ADMIN — BUDESONIDE AND FORMOTEROL FUMARATE DIHYDRATE 2 PUFF(S): 160; 4.5 AEROSOL RESPIRATORY (INHALATION) at 20:37

## 2019-08-26 RX ADMIN — Medication 50 GRAM(S): at 12:31

## 2019-08-26 RX ADMIN — Medication 2 TABLET(S): at 06:25

## 2019-08-26 NOTE — PROGRESS NOTE ADULT - ASSESSMENT
Mr. Damian is a 59 year old male with CKD on HD s/p LUE radiocephalic AVF    -Remainder of care as per primary team  -schedule CABG outpatient

## 2019-08-26 NOTE — PROGRESS NOTE ADULT - ASSESSMENT
60y/o M, active smoker, with PMHx of DM2, HTN, HLD, prior CVA (Poor historian-Unclear of time, Reports no sensory/motor deficits), sciatica (Intermit Lt lower ext numbness), history of medication non-compliance.   [place]17[place] [facility] [place] of [place]B with concurrent chest discomfort while performing yard work. Admitted with HTN emergency, acute on CKD. + NSTEMI  [place] [facility] [place] LHC revealed multivessel CAD (dLAD 90%, D1 80%, pCXF 70%, mCXF 70%, OM1 80%, mRCA, 95%, RPDLS 70%) for which pt will need CABG.     During HD at [facility], pt noted to be obtunded, found to be hypotensive, IVF resuscitation given, BP improved, and mental status returned to baseline.  8/21 dialysis for clearance only, no fluid removed. Vascular surgery consulted (R chest wall permacath may need to be removed d/t non-occlusive thrombus seen, preserve LUE for eventual AVF).  8/22 tentative AVF on Monday 8/26 with vascular

## 2019-08-26 NOTE — PROGRESS NOTE ADULT - SUBJECTIVE AND OBJECTIVE BOX
Subjective:  "Im ok, just waiting around for this fistula"  OOB chair    Tele:   SR   70s                             T(C): 36.7 (08-26-19 @ 06:22), Max: 37.2 (08-25-19 @ 12:00)  HR: 64 (08-26-19 @ 06:22) (64 - 78)  BP: 128/72 (08-26-19 @ 06:22) (124/58 - 139/84)  RR: 16 (08-26-19 @ 06:22) (16 - 16)  SpO2: 99% (08-26-19 @ 06:22) (87% - 100%)    LVEF: 35%      08-26    136  |  98  |  54.0<H>  ----------------------------<  145<H>  3.9   |  24.0  |  5.12<H>    Ca    8.0<L>      26 Aug 2019 05:55  Phos  5.0     08-26  Mg     1.9     08-26    TPro  6.7  /  Alb  3.1<L>  /  TBili  0.2<L>  /  DBili  x   /  AST  16  /  ALT  19  /  AlkPhos  92  08-26                               11.7   12.48 )-----------( 252      ( 26 Aug 2019 05:55 )             37.7        PT/INR - ( 26 Aug 2019 05:55 )   PT: 11.6 sec;   INR: 1.01 ratio         PTT - ( 26 Aug 2019 05:55 )  PTT:28.7 sec    CAPILLARY BLOOD GLUCOSE      POCT Blood Glucose.: 165 mg/dL (26 Aug 2019 08:25)  POCT Blood Glucose.: 148 mg/dL (25 Aug 2019 21:20)  POCT Blood Glucose.: 134 mg/dL (25 Aug 2019 17:03)           Assessment    General: well nourished, well developed, no acute distress    Neurology: alert and oriented x 3, nonfocal, no gross deficits    Respiratory: clear to auscultation bilaterally    CV: regular rate and rhythm, normal S1, S2    Abdomen: soft, nontender, nondistended, positive bowel sounds     Extremities: warm, well perfused. no edema. + DP pulses  R SC permacath          MEDICATIONS  (STANDING):  aspirin enteric coated 81 milliGRAM(s) Oral daily  atorvastatin 40 milliGRAM(s) Oral at bedtime  buDESOnide 160 MICROgram(s)/formoterol 4.5 MICROgram(s) Inhaler 2 Puff(s) Inhalation two times a day  calcium carbonate   1250 mG (OsCal) 2 Tablet(s) Oral two times a day  dextrose 5%. 1000 milliLiter(s) (50 mL/Hr) IV Continuous <Continuous>  dextrose 50% Injectable 12.5 Gram(s) IV Push once  dextrose 50% Injectable 25 Gram(s) IV Push once  dextrose 50% Injectable 25 Gram(s) IV Push once  insulin lispro (HumaLOG) corrective regimen sliding scale   SubCutaneous Before meals and at bedtime  insulin lispro Injectable (HumaLOG) 2 Unit(s) SubCutaneous three times a day before meals  magnesium sulfate  IVPB 2 Gram(s) IV Intermittent once  metoprolol tartrate 25 milliGRAM(s) Oral every 12 hours  paricalcitol Injectable 2 MICROGram(s) IV Push <User Schedule>  sevelamer carbonate 800 milliGRAM(s) Oral three times a day with meals  sodium chloride 0.9% lock flush 3 milliLiter(s) IV Push every 8 hours       PAST MEDICAL & SURGICAL HISTORY:  HLD (hyperlipidemia)  Cerebrovascular accident (CVA): Poor historian, unaware of date  DM2 (diabetes mellitus, type 2)  HTN (hypertension)  No significant past surgical history

## 2019-08-26 NOTE — PROGRESS NOTE ADULT - SUBJECTIVE AND OBJECTIVE BOX
INTERVAL HPI/OVERNIGHT EVENTS: No acute overnight events     SUBJECTIVE: No complaints; states he knows his surgical procedure is today on the left arm; arm being preserved    MEDICATIONS  (STANDING):  aspirin enteric coated 81 milliGRAM(s) Oral daily  atorvastatin 40 milliGRAM(s) Oral at bedtime  buDESOnide 160 MICROgram(s)/formoterol 4.5 MICROgram(s) Inhaler 2 Puff(s) Inhalation two times a day  calcium carbonate   1250 mG (OsCal) 2 Tablet(s) Oral two times a day  dextrose 5%. 1000 milliLiter(s) (50 mL/Hr) IV Continuous <Continuous>  dextrose 50% Injectable 12.5 Gram(s) IV Push once  dextrose 50% Injectable 25 Gram(s) IV Push once  dextrose 50% Injectable 25 Gram(s) IV Push once  insulin lispro (HumaLOG) corrective regimen sliding scale   SubCutaneous Before meals and at bedtime  insulin lispro Injectable (HumaLOG) 2 Unit(s) SubCutaneous three times a day before meals  metoprolol tartrate 25 milliGRAM(s) Oral every 12 hours  paricalcitol Injectable 2 MICROGram(s) IV Push <User Schedule>  sevelamer carbonate 800 milliGRAM(s) Oral three times a day with meals  sodium chloride 0.9% lock flush 3 milliLiter(s) IV Push every 8 hours    MEDICATIONS  (PRN):  ALBUTerol/ipratropium for Nebulization. 3 milliLiter(s) Nebulizer once PRN Shortness of Breath  dextrose 40% Gel 15 Gram(s) Oral once PRN Blood Glucose LESS THAN 70 milliGRAM(s)/deciliter  glucagon  Injectable 1 milliGRAM(s) IntraMuscular once PRN Glucose LESS THAN 70 milligrams/deciliter      Vital Signs Last 24 Hrs  T(C): 36.7 (26 Aug 2019 06:22), Max: 37.2 (25 Aug 2019 12:00)  T(F): 98 (26 Aug 2019 06:22), Max: 98.9 (25 Aug 2019 12:00)  HR: 64 (26 Aug 2019 06:22) (64 - 78)  BP: 128/72 (26 Aug 2019 06:22) (124/58 - 139/84)  RR: 16 (26 Aug 2019 06:22) (16 - 16)  SpO2: 99% (26 Aug 2019 06:22) (87% - 100%)    PE  general: no acute distress, aoX3  Respiratory: non labored, no accessory muscle use  Cardiovascular: Regular rate & rhythm  Gastrointestinal: Soft, non-tender  Extremities: No peripheral edema, No cyanosis, clubbing. motor/sensation grossly intact  Vascular: extremities warm to touch and well perfused  Musculoskeletal: No joint pain, swelling or deformity; no limitation of movement  Skin: no erythema, drainage or swelling at right IJ permacath site.       I&O's Detail    25 Aug 2019 07:01  -  26 Aug 2019 07:00  --------------------------------------------------------  IN:  Total IN: 0 mL    OUT:    Voided: 2150 mL  Total OUT: 2150 mL    Total NET: -2150 mL          LABS:                        11.7   12.48 )-----------( 252      ( 26 Aug 2019 05:55 )             37.7     08-26    136  |  98  |  54.0<H>  ----------------------------<  145<H>  3.9   |  24.0  |  5.12<H>    Ca    8.0<L>      26 Aug 2019 05:55  Phos  5.0     08-26  Mg     1.9     08-26    TPro  6.7  /  Alb  3.1<L>  /  TBili  0.2<L>  /  DBili  x   /  AST  16  /  ALT  19  /  AlkPhos  92  08-26    PT/INR - ( 26 Aug 2019 05:55 )   PT: 11.6 sec;   INR: 1.01 ratio         PTT - ( 26 Aug 2019 05:55 )  PTT:28.7 sec      RADIOLOGY & ADDITIONAL STUDIES: no addl studies

## 2019-08-26 NOTE — PROGRESS NOTE ADULT - ASSESSMENT
1. ESRD on HD  2. CAD  3. DM II  4. CHF  5. HTN    HD in am - orders placed  Stop Metformin   FS AC+ HS with coverage  On Lopressor  AVF planned in am   Continue current management at this time

## 2019-08-26 NOTE — PROGRESS NOTE ADULT - SUBJECTIVE AND OBJECTIVE BOX
INTERVAL HPI/OVERNIGHT EVENTS: follow up of diabetes    MEDICATIONS  (STANDING):  aspirin enteric coated 81 milliGRAM(s) Oral daily  atorvastatin 40 milliGRAM(s) Oral at bedtime  buDESOnide 160 MICROgram(s)/formoterol 4.5 MICROgram(s) Inhaler 2 Puff(s) Inhalation two times a day  calcium carbonate   1250 mG (OsCal) 2 Tablet(s) Oral two times a day  dextrose 5%. 1000 milliLiter(s) (50 mL/Hr) IV Continuous <Continuous>  dextrose 50% Injectable 12.5 Gram(s) IV Push once  dextrose 50% Injectable 25 Gram(s) IV Push once  dextrose 50% Injectable 25 Gram(s) IV Push once  insulin lispro (HumaLOG) corrective regimen sliding scale   SubCutaneous Before meals and at bedtime  insulin lispro Injectable (HumaLOG) 2 Unit(s) SubCutaneous three times a day before meals  magnesium sulfate  IVPB 2 Gram(s) IV Intermittent once  metoprolol tartrate 25 milliGRAM(s) Oral every 12 hours  paricalcitol Injectable 2 MICROGram(s) IV Push <User Schedule>  sevelamer carbonate 800 milliGRAM(s) Oral three times a day with meals  sodium chloride 0.9% lock flush 3 milliLiter(s) IV Push every 8 hours    MEDICATIONS  (PRN):  ALBUTerol/ipratropium for Nebulization. 3 milliLiter(s) Nebulizer once PRN Shortness of Breath  dextrose 40% Gel 15 Gram(s) Oral once PRN Blood Glucose LESS THAN 70 milliGRAM(s)/deciliter  glucagon  Injectable 1 milliGRAM(s) IntraMuscular once PRN Glucose LESS THAN 70 milligrams/deciliter      Allergies    lactose (Flatulence (Severe); Diarrhea (Severe); Stomach Upset (Severe); Nausea (Severe))  No Known Drug Allergies    Intolerances        Review of systems: no complaints    Vital Signs Last 24 Hrs  T(C): 36.7 (26 Aug 2019 06:22), Max: 37.2 (25 Aug 2019 12:00)  T(F): 98 (26 Aug 2019 06:22), Max: 98.9 (25 Aug 2019 12:00)  HR: 64 (26 Aug 2019 06:22) (64 - 78)  BP: 128/72 (26 Aug 2019 06:22) (124/58 - 139/84)  BP(mean): --  RR: 16 (26 Aug 2019 06:22) (16 - 16)  SpO2: 99% (26 Aug 2019 06:22) (87% - 100%)          LABS:                        11.7   12.48 )-----------( 252      ( 26 Aug 2019 05:55 )             37.7     08-26    136  |  98  |  54.0<H>  ----------------------------<  145<H>  3.9   |  24.0  |  5.12<H>    Ca    8.0<L>      26 Aug 2019 05:55  Phos  5.0     08-26  Mg     1.9     08-26    TPro  6.7  /  Alb  3.1<L>  /  TBili  0.2<L>  /  DBili  x   /  AST  16  /  ALT  19  /  AlkPhos  92  08-26          POCT Blood Glucose.: 165 mg/dL (08-26-19 @ 08:25)  POCT Blood Glucose.: 148 mg/dL (08-25-19 @ 21:20)  POCT Blood Glucose.: 134 mg/dL (08-25-19 @ 17:03)  POCT Blood Glucose.: 128 mg/dL (08-25-19 @ 08:31)  POCT Blood Glucose.: 181 mg/dL (08-24-19 @ 21:30)  POCT Blood Glucose.: 110 mg/dL (08-24-19 @ 17:05)  POCT Blood Glucose.: 153 mg/dL (08-24-19 @ 12:11)  POCT Blood Glucose.: 121 mg/dL (08-24-19 @ 08:19)  POCT Blood Glucose.: 127 mg/dL (08-23-19 @ 21:49)  POCT Blood Glucose.: 151 mg/dL (08-23-19 @ 21:00)  POCT Blood Glucose.: 127 mg/dL (08-23-19 @ 17:07)  POCT Blood Glucose.: 124 mg/dL (08-23-19 @ 12:06)

## 2019-08-26 NOTE — PROGRESS NOTE ADULT - ASSESSMENT
Mr. Damian is a 59 year old male with CKD on HD sp full HD on 8/24 via R IJ permacath tolerated well after permacath was flushed with tPa on 8/23    -Continue scheduled HD sessions via permacath  -Left upper extremity AV fistula TODAY with Dr. Roberts  -We request that the primary team please preserve Left upper extremity for OR. No blood draws or BP cuffs on the LUE.  -Remainder of care as per primary team

## 2019-08-26 NOTE — PROGRESS NOTE ADULT - SUBJECTIVE AND OBJECTIVE BOX
[facility] DIVISION OF KIDNEY DISEASES AND HYPERTENSION -- FOLLOW UP NOTE  --------------------------------------------------------------------------------  Chief Complaint:  ESRD on HD    24 hour events/subjective:  Pt seen and examined  NAD  HD yesterday      PAST HISTORY  --------------------------------------------------------------------------------  No significant changes to PMH, PSH, FHx, SHx, unless otherwise noted    ALLERGIES & MEDICATIONS  --------------------------------------------------------------------------------  Allergies    lactose (Flatulence (Severe); Diarrhea (Severe); Stomach Upset (Severe); Nausea (Severe))  No Known Drug Allergies    Intolerances      Standing Inpatient Medications  aspirin enteric coated 81 milliGRAM(s) Oral daily  atorvastatin 40 milliGRAM(s) Oral at bedtime  buDESOnide 160 MICROgram(s)/formoterol 4.5 MICROgram(s) Inhaler 2 Puff(s) Inhalation two times a day  calcium carbonate   1250 mG (OsCal) 2 Tablet(s) Oral two times a day  dextrose 5%. 1000 milliLiter(s) IV Continuous <Continuous>  dextrose 50% Injectable 12.5 Gram(s) IV Push once  dextrose 50% Injectable 25 Gram(s) IV Push once  dextrose 50% Injectable 25 Gram(s) IV Push once  insulin lispro (HumaLOG) corrective regimen sliding scale   SubCutaneous Before meals and at bedtime  insulin lispro Injectable (HumaLOG) 2 Unit(s) SubCutaneous three times a day before meals  magnesium sulfate  IVPB 2 Gram(s) IV Intermittent once  metoprolol tartrate 25 milliGRAM(s) Oral every 12 hours  paricalcitol Injectable 2 MICROGram(s) IV Push <User Schedule>  sevelamer carbonate 800 milliGRAM(s) Oral three times a day with meals  sodium chloride 0.9% lock flush 3 milliLiter(s) IV Push every 8 hours    PRN Inpatient Medications  ALBUTerol/ipratropium for Nebulization. 3 milliLiter(s) Nebulizer once PRN  dextrose 40% Gel 15 Gram(s) Oral once PRN  glucagon  Injectable 1 milliGRAM(s) IntraMuscular once PRN      REVIEW OF SYSTEMS  --------------------------------------------------------------------------------  Gen: No weight changes, fatigue, fevers/chills, weakness  Skin: No rashes  Head/Eyes/Ears/Mouth: No headache; Normal hearing; Normal vision w/o blurriness; No sinus pain/discomfort, sore throat  Respiratory: No dyspnea, cough, wheezing, hemoptysis  CV: No chest pain, PND, orthopnea  GI: No abdominal pain, diarrhea, constipation, nausea, vomiting, melena, hematochezia  : No increased frequency, dysuria, hematuria, nocturia  MSK: No joint pain/swelling; no back pain; no edema  Neuro: No dizziness/lightheadedness, weakness, seizures, numbness, tingling  Heme: No easy bruising or bleeding  Endo: No heat/cold intolerance  Psych: No significant nervousness, anxiety, stress, depression    All other systems were reviewed and are negative, except as noted.    VITALS/PHYSICAL EXAM  --------------------------------------------------------------------------------  T(C): 36.7 (08-26-19 @ 06:22), Max: 37.2 (08-25-19 @ 12:00)  HR: 64 (08-26-19 @ 06:22) (64 - 78)  BP: 128/72 (08-26-19 @ 06:22) (124/58 - 139/84)  RR: 16 (08-26-19 @ 06:22) (16 - 16)  SpO2: 99% (08-26-19 @ 06:22) (87% - 100%)  Wt(kg): --        08-25-19 @ 07:01  -  08-26-19 @ 07:00  --------------------------------------------------------  IN: 0 mL / OUT: 2150 mL / NET: -2150 mL      Physical Exam:  	Gen: NAD  	HEENT: PERRL, supple neck, clear oropharynx  	Pulm: CTA B/L  	CV: RRR, S1S2; no rub  	Back: No spinal or CVA tenderness; no sacral edema  	Abd: +BS, soft, nontender/nondistended  	: No suprapubic tenderness  	UE: Warm, FROM, no clubbing, intact strength; no edema; no asterixis  	LE: Warm, FROM, no clubbing, intact strength; no edema  	Neuro: No focal deficits, intact gait  	Psych: Normal affect and mood  	Skin: Warm, without rashes  	Vascular access:  CVC    LABS/STUDIES  --------------------------------------------------------------------------------              11.7   12.48 >-----------<  252      [08-26-19 @ 05:55]              37.7     136  |  98  |  54.0  ----------------------------<  145      [08-26-19 @ 05:55]  3.9   |  24.0  |  5.12        Ca     8.0     [08-26-19 @ 05:55]      Mg     1.9     [08-26-19 @ 05:55]      Phos  5.0     [08-26-19 @ 05:55]    TPro  6.7  /  Alb  3.1  /  TBili  0.2  /  DBili  x   /  AST  16  /  ALT  19  /  AlkPhos  92  [08-26-19 @ 05:55]    PT/INR: PT 11.6 , INR 1.01       [08-26-19 @ 05:55]  PTT: 28.7       [08-26-19 @ 05:55]      Creatinine Trend:  SCr 5.12 [08-26 @ 05:55]  SCr 4.77 [08-24 @ 06:32]  SCr 5.01 [08-23 @ 05:46]  SCr 4.24 [08-22 @ 03:53]  SCr 4.66 [08-21 @ 02:48]    Urinalysis - [08-20-19 @ 13:32]      Color Yellow / Appearance Clear / SG 1.005 / pH 7.0      Gluc Negative / Ketone Negative  / Bili Negative / Urobili Negative       Blood Small / Protein 100 / Leuk Est Negative / Nitrite Negative      RBC 3-5 / WBC 0-2 / Hyaline  / Gran  / Sq Epi  / Non Sq Epi Occasional / Bacteria Occasional    Urine Creatinine 49      [08-20-19 @ 15:44]  Urine Protein 127.0      [08-20-19 @ 15:44]    Iron 37, TIBC 204, %sat 18      [08-20-19 @ 15:20]  Ferritin 305      [08-20-19 @ 15:20]  PTH -- (Ca 6.9)      [08-20-19 @ 23:33]   456  Vitamin D (25OH) 11.3      [08-21-19 @ 00:00]  HbA1c 8.4      [08-20-19 @ 13:09]  TSH 1.97      [08-20-19 @ 13:09]  Lipid: chol 209, , HDL 32,       [08-20-19 @ 13:09]    HBsAb <3.0      [08-21-19 @ 22:09]  HBsAg Nonreact      [08-21-19 @ 22:09]  HBcAb Nonreact      [08-21-19 @ 22:09]  HCV 0.12, Nonreact      [08-21-19 @ 17:20]

## 2019-08-26 NOTE — BRIEF OPERATIVE NOTE - OPERATION/FINDINGS
-cephalic vein mobilized at level of AC fossa with adequate diameter and length; flushed with heparinized saline  - arteriotomy done on proximal radial artery distal to brachial artery bifurcation  - radiocephalic anastomosis done  - palpable thrill and palpable radial pulse  - good arterial inflow and venous outflow

## 2019-08-26 NOTE — CHART NOTE - NSCHARTNOTEFT_GEN_A_CORE
58y/o M, active smoker, with PMHx of DM2, HTN, HLD, prior CVA (Poor historian-Unclear of time, Reports no sensory/motor deficits), sciatica (Intermit Lt lower ext numbness), history of medication non-compliance.   Needs CABG for 3 VCAD however new to hemodialysis for St 3 KD currently utilizing R permacath>> cleared for AVF placement today  Will arrange CABG as outpt (pt w/o symptoms) pt will be discharged home next 24-48 hrs

## 2019-08-26 NOTE — BRIEF OPERATIVE NOTE - NSICDXBRIEFPROCEDURE_GEN_ALL_CORE_FT
PROCEDURES:  AV fistula 26-Aug-2019 17:15:33 creation of LUE proximal radiocephalic AV fistula Cesar Yang

## 2019-08-26 NOTE — PROGRESS NOTE ADULT - SUBJECTIVE AND OBJECTIVE BOX
HPI/OVERNIGHT EVENTS: Patient resting in bed. He denies any pain. He is tolerating diet. He denies loss of sensation, tingling, numbness, paresthesias of his upper extremity.     Vital Signs Last 24 Hrs  T(C): 36.1 (26 Aug 2019 18:07), Max: 37.1 (25 Aug 2019 22:34)  T(F): 97 (26 Aug 2019 18:07), Max: 98.8 (25 Aug 2019 22:34)  HR: 58 (26 Aug 2019 20:40) (50 - 66)  BP: 140/73 (26 Aug 2019 18:07) (90/48 - 165/60)  BP(mean): --  RR: 18 (26 Aug 2019 18:07) (10 - 18)  SpO2: 99% (26 Aug 2019 20:40) (97% - 100%)    I&O's Detail    25 Aug 2019 07:01  -  26 Aug 2019 07:00  --------------------------------------------------------  IN:  Total IN: 0 mL    OUT:    Voided: 2150 mL  Total OUT: 2150 mL    Total NET: -2150 mL      26 Aug 2019 07:01  -  26 Aug 2019 21:10  --------------------------------------------------------  IN:    Other: 400 mL  Total IN: 400 mL    OUT:  Total OUT: 0 mL    Total NET: 400 mL      Constitutional: patient resting comfortably in bed, in no acute distress  Respiratory: CTAB respirations are unlabored, no accessory muscle use, no conversational dyspnea  Cardiovascular: radial pulses intact bilaterally. Palpable thrill over LUE AVF  Gastrointestinal: Abdomen soft, non-tender, non-distended, no rebound tenderness / guarding  Incision/Wound: Clean, dry and intact      LABS:                        11.7   12.48 )-----------( 252      ( 26 Aug 2019 05:55 )             37.7     08-26    136  |  98  |  54.0<H>  ----------------------------<  145<H>  3.9   |  24.0  |  5.12<H>    Ca    8.0<L>      26 Aug 2019 05:55  Phos  5.0     08-26  Mg     1.9     08-26    TPro  6.7  /  Alb  3.1<L>  /  TBili  0.2<L>  /  DBili  x   /  AST  16  /  ALT  19  /  AlkPhos  92  08-26    PT/INR - ( 26 Aug 2019 05:55 )   PT: 11.6 sec;   INR: 1.01 ratio         PTT - ( 26 Aug 2019 05:55 )  PTT:28.7 sec      MEDICATIONS  (STANDING):  aspirin enteric coated 81 milliGRAM(s) Oral daily  atorvastatin 40 milliGRAM(s) Oral at bedtime  buDESOnide 160 MICROgram(s)/formoterol 4.5 MICROgram(s) Inhaler 2 Puff(s) Inhalation two times a day  calcium carbonate   1250 mG (OsCal) 2 Tablet(s) Oral two times a day  dextrose 5%. 1000 milliLiter(s) (50 mL/Hr) IV Continuous <Continuous>  dextrose 50% Injectable 12.5 Gram(s) IV Push once  dextrose 50% Injectable 25 Gram(s) IV Push once  dextrose 50% Injectable 25 Gram(s) IV Push once  insulin lispro (HumaLOG) corrective regimen sliding scale   SubCutaneous Before meals and at bedtime  insulin lispro Injectable (HumaLOG) 2 Unit(s) SubCutaneous three times a day before meals  metoprolol tartrate 25 milliGRAM(s) Oral every 12 hours  paricalcitol Injectable 2 MICROGram(s) IV Push <User Schedule>  sevelamer carbonate 800 milliGRAM(s) Oral three times a day with meals  sodium chloride 0.9% lock flush 3 milliLiter(s) IV Push every 8 hours    MEDICATIONS  (PRN):  ALBUTerol/ipratropium for Nebulization. 3 milliLiter(s) Nebulizer once PRN Shortness of Breath  dextrose 40% Gel 15 Gram(s) Oral once PRN Blood Glucose LESS THAN 70 milliGRAM(s)/deciliter  glucagon  Injectable 1 milliGRAM(s) IntraMuscular once PRN Glucose LESS THAN 70 milligrams/deciliter

## 2019-08-26 NOTE — PROGRESS NOTE ADULT - PROBLEM SELECTOR PLAN 1
8/20: Transferred from [facility] for CABG workup  likely d/c home when optimized/seat available and return as outpt for elective surgery  asa, lipitor and lopressor  Replete magnesium hypomagnesmia

## 2019-08-27 LAB
ALBUMIN SERPL ELPH-MCNC: 3.3 G/DL — SIGNIFICANT CHANGE UP (ref 3.3–5.2)
ALP SERPL-CCNC: 88 U/L — SIGNIFICANT CHANGE UP (ref 40–120)
ALT FLD-CCNC: 13 U/L — SIGNIFICANT CHANGE UP
ANION GAP SERPL CALC-SCNC: 15 MMOL/L — SIGNIFICANT CHANGE UP (ref 5–17)
AST SERPL-CCNC: 15 U/L — SIGNIFICANT CHANGE UP
BILIRUB SERPL-MCNC: 0.3 MG/DL — LOW (ref 0.4–2)
BUN SERPL-MCNC: 66 MG/DL — HIGH (ref 8–20)
CALCIUM SERPL-MCNC: 8.3 MG/DL — LOW (ref 8.6–10.2)
CHLORIDE SERPL-SCNC: 100 MMOL/L — SIGNIFICANT CHANGE UP (ref 98–107)
CO2 SERPL-SCNC: 23 MMOL/L — SIGNIFICANT CHANGE UP (ref 22–29)
CREAT SERPL-MCNC: 5.47 MG/DL — HIGH (ref 0.5–1.3)
GLUCOSE BLDC GLUCOMTR-MCNC: 124 MG/DL — HIGH (ref 70–99)
GLUCOSE BLDC GLUCOMTR-MCNC: 124 MG/DL — HIGH (ref 70–99)
GLUCOSE BLDC GLUCOMTR-MCNC: 125 MG/DL — HIGH (ref 70–99)
GLUCOSE BLDC GLUCOMTR-MCNC: 151 MG/DL — HIGH (ref 70–99)
GLUCOSE BLDC GLUCOMTR-MCNC: 157 MG/DL — HIGH (ref 70–99)
GLUCOSE SERPL-MCNC: 111 MG/DL — SIGNIFICANT CHANGE UP (ref 70–115)
HCT VFR BLD CALC: 38.1 % — LOW (ref 39–50)
HGB BLD-MCNC: 11.9 G/DL — LOW (ref 13–17)
MCHC RBC-ENTMCNC: 26 PG — LOW (ref 27–34)
MCHC RBC-ENTMCNC: 31.2 GM/DL — LOW (ref 32–36)
MCV RBC AUTO: 83.4 FL — SIGNIFICANT CHANGE UP (ref 80–100)
PLATELET # BLD AUTO: 245 K/UL — SIGNIFICANT CHANGE UP (ref 150–400)
POTASSIUM SERPL-MCNC: 4.6 MMOL/L — SIGNIFICANT CHANGE UP (ref 3.5–5.3)
POTASSIUM SERPL-SCNC: 4.6 MMOL/L — SIGNIFICANT CHANGE UP (ref 3.5–5.3)
PROT SERPL-MCNC: 6.8 G/DL — SIGNIFICANT CHANGE UP (ref 6.6–8.7)
RBC # BLD: 4.57 M/UL — SIGNIFICANT CHANGE UP (ref 4.2–5.8)
RBC # FLD: 13.7 % — SIGNIFICANT CHANGE UP (ref 10.3–14.5)
SODIUM SERPL-SCNC: 138 MMOL/L — SIGNIFICANT CHANGE UP (ref 135–145)
WBC # BLD: 12.82 K/UL — HIGH (ref 3.8–10.5)
WBC # FLD AUTO: 12.82 K/UL — HIGH (ref 3.8–10.5)

## 2019-08-27 PROCEDURE — 90937 HEMODIALYSIS REPEATED EVAL: CPT

## 2019-08-27 PROCEDURE — 99232 SBSQ HOSP IP/OBS MODERATE 35: CPT

## 2019-08-27 RX ADMIN — BUDESONIDE AND FORMOTEROL FUMARATE DIHYDRATE 2 PUFF(S): 160; 4.5 AEROSOL RESPIRATORY (INHALATION) at 20:38

## 2019-08-27 RX ADMIN — BUDESONIDE AND FORMOTEROL FUMARATE DIHYDRATE 2 PUFF(S): 160; 4.5 AEROSOL RESPIRATORY (INHALATION) at 08:31

## 2019-08-27 RX ADMIN — SEVELAMER CARBONATE 800 MILLIGRAM(S): 2400 POWDER, FOR SUSPENSION ORAL at 08:29

## 2019-08-27 RX ADMIN — Medication 81 MILLIGRAM(S): at 12:24

## 2019-08-27 RX ADMIN — Medication 2 TABLET(S): at 05:31

## 2019-08-27 RX ADMIN — Medication 2 UNIT(S): at 17:58

## 2019-08-27 RX ADMIN — SEVELAMER CARBONATE 800 MILLIGRAM(S): 2400 POWDER, FOR SUSPENSION ORAL at 17:50

## 2019-08-27 RX ADMIN — SODIUM CHLORIDE 3 MILLILITER(S): 9 INJECTION INTRAMUSCULAR; INTRAVENOUS; SUBCUTANEOUS at 05:31

## 2019-08-27 RX ADMIN — SODIUM CHLORIDE 3 MILLILITER(S): 9 INJECTION INTRAMUSCULAR; INTRAVENOUS; SUBCUTANEOUS at 21:02

## 2019-08-27 RX ADMIN — SEVELAMER CARBONATE 800 MILLIGRAM(S): 2400 POWDER, FOR SUSPENSION ORAL at 12:24

## 2019-08-27 RX ADMIN — Medication 25 MILLIGRAM(S): at 05:31

## 2019-08-27 RX ADMIN — Medication 2 UNIT(S): at 08:19

## 2019-08-27 RX ADMIN — Medication 2 TABLET(S): at 17:50

## 2019-08-27 RX ADMIN — Medication 25 MILLIGRAM(S): at 17:49

## 2019-08-27 RX ADMIN — PARICALCITOL 2 MICROGRAM(S): 2 CAPSULE, GELATIN COATED ORAL at 15:24

## 2019-08-27 RX ADMIN — SODIUM CHLORIDE 3 MILLILITER(S): 9 INJECTION INTRAMUSCULAR; INTRAVENOUS; SUBCUTANEOUS at 17:48

## 2019-08-27 RX ADMIN — ATORVASTATIN CALCIUM 40 MILLIGRAM(S): 80 TABLET, FILM COATED ORAL at 21:21

## 2019-08-27 RX ADMIN — Medication 1: at 12:24

## 2019-08-27 RX ADMIN — Medication 2 UNIT(S): at 12:24

## 2019-08-27 NOTE — PROGRESS NOTE ADULT - ASSESSMENT
60y/o M, active smoker, with PMHx of DM2, HTN, HLD, prior CVA (Poor historian-Unclear of time, Reports no sensory/motor deficits), sciatica (Intermit Lt lower ext numbness), history of medication non-compliance.   8/17: presented to [facility] w/ c/o sudden onset of SOB with concurrent chest discomfort while performing yard work. Admitted with HTN emergency, acute on CKD. + NSTEMI  8/20 transferred from [facility] after subsequent LHC revealed multivessel CAD (dLAD 90%, D1 80%, pCXF 70%, mCXF 70%, OM1 80%, mRCA, 95%, RPDLS 70%) for which pt will need CABG.     During HD at [facility], pt noted to be obtunded, found to be hypotensive, IVF resuscitation given, BP improved, and mental status returned to baseline.  8/21 dialysis for clearance only, no fluid removed. Vascular surgery consulted (R chest wall permacath may need to be removed d/t non-occlusive thrombus seen, preserve LUE for eventual AVF).  8/22 tentative AVF on Monday 8/26 with vascular

## 2019-08-27 NOTE — PROGRESS NOTE ADULT - ASSESSMENT
58y/o M, active smoker, with PMHx of DM2, HTN, HLD, prior CVA (Poor historian-Unclear of time, Reports no sensory/motor deficits), sciatica (Intermit Lt lower ext numbness), history of medication non-compliance.   8/17: presented to [facility] w/ c/o sudden onset of SOB with concurrent chest discomfort while performing yard work. Admitted with HTN emergency, acute on CKD. + NSTEMI  8/20 transferred from [facility] after subsequent LHC revealed multivessel CAD (dLAD 90%, D1 80%, pCXF 70%, mCXF 70%, OM1 80%, mRCA, 95%, RPDLS 70%) for which pt will need CABG.     During HD at [facility], pt noted to be obtunded, found to be hypotensive, IVF resuscitation given, BP improved, and mental status returned to baseline.  8/21 dialysis for clearance only, no fluid removed. Vascular surgery consulted (R chest wall permacath may need to be removed d/t non-occlusive thrombus seen, preserve LUE for eventual AVF).  8/22 tentative AVF on Monday 8/26 with vascular      Problem/Plan - 1:  ·  Problem: CAD (coronary artery disease).  Plan: 8/20: Transferred from [facility] for CABG workup  likely d/c home when optimized,    Elective OHS,        Problem/Plan - 2:  ·  Problem: ESRD,  Plan: s/p Left AVF POD #1.       Problem/Plan - 3:  ·  Problem: Acute combined systolic and diastolic heart failure.         Problem/Plan - 4:  ·  Problem: DM2 (diabetes mellitus, type 2).  Plan: Hold Metformin,  FS AC+HS with coverage.  Continue Humalog.      Awaiting dialysis seat in the community.    left upper ext warm, incision site CDI with Dermabond  palpable cephalic outflow thrill  No gross motor or sensory deficit to the left upper extremity  + left radial and brachial  .     Assessment:  • POD 1 s/p proximal radial to cephalic AVF, Patent   - No motor/sensory/perfusion deficits     Plan:   • Continue access vial right TDC  till Left avf matures (~2months)

## 2019-08-27 NOTE — PROGRESS NOTE ADULT - SUBJECTIVE AND OBJECTIVE BOX
KATELYN STANLEY    [id number]    History:  The patient is status post left AVF creation, POD 1. Patient is doing well. The patient's pain is controlled using the prescribed pain medications.  Denies nausea, vomiting, chest pain, shortness of breath, abdominal pain or fever. No new complaints. No acute motor or sensory changes are reported.    Vital Signs Last 24 Hrs  T(C): 36.4 (27 Aug 2019 05:28), Max: 37.1 (26 Aug 2019 11:00)  T(F): 97.5 (27 Aug 2019 05:28), Max: 98.7 (26 Aug 2019 11:00)  HR: 70 (27 Aug 2019 08:33) (50 - 73)  BP: 166/76 (27 Aug 2019 05:28) (90/48 - 166/76)  BP(mean): --  RR: 18 (27 Aug 2019 05:28) (10 - 18)  SpO2: 100% (27 Aug 2019 05:28) (97% - 100%)  I&O's Summary    26 Aug 2019 07:01  -  27 Aug 2019 07:00  --------------------------------------------------------  IN: 460 mL / OUT: 600 mL / NET: -140 mL                              11.9   12.82 )-----------( 245      ( 27 Aug 2019 06:17 )             38.1     08-27    138  |  100  |  66.0<H>  ----------------------------<  111  4.6   |  23.0  |  5.47<H>    Ca    8.3<L>      27 Aug 2019 06:17  Phos  5.0     08-26  Mg     1.9     08-26    TPro  6.8  /  Alb  3.3  /  TBili  0.3<L>  /  DBili  x   /  AST  15  /  ALT  13  /  AlkPhos  88  08-27      MEDICATIONS  (STANDING):  aspirin enteric coated 81 milliGRAM(s) Oral daily  atorvastatin 40 milliGRAM(s) Oral at bedtime  buDESOnide 160 MICROgram(s)/formoterol 4.5 MICROgram(s) Inhaler 2 Puff(s) Inhalation two times a day  calcium carbonate   1250 mG (OsCal) 2 Tablet(s) Oral two times a day  dextrose 5%. 1000 milliLiter(s) (50 mL/Hr) IV Continuous <Continuous>  dextrose 50% Injectable 12.5 Gram(s) IV Push once  dextrose 50% Injectable 25 Gram(s) IV Push once  dextrose 50% Injectable 25 Gram(s) IV Push once  insulin lispro (HumaLOG) corrective regimen sliding scale   SubCutaneous Before meals and at bedtime  insulin lispro Injectable (HumaLOG) 2 Unit(s) SubCutaneous three times a day before meals  metoprolol tartrate 25 milliGRAM(s) Oral every 12 hours  paricalcitol Injectable 2 MICROGram(s) IV Push <User Schedule>  sevelamer carbonate 800 milliGRAM(s) Oral three times a day with meals  sodium chloride 0.9% lock flush 3 milliLiter(s) IV Push every 8 hours    MEDICATIONS  (PRN):  ALBUTerol/ipratropium for Nebulization. 3 milliLiter(s) Nebulizer once PRN Shortness of Breath  dextrose 40% Gel 15 Gram(s) Oral once PRN Blood Glucose LESS THAN 70 milliGRAM(s)/deciliter  glucagon  Injectable 1 milliGRAM(s) IntraMuscular once PRN Glucose LESS THAN 70 milligrams/deciliter      Physical exam:    No distress, alert and oriented  non labored breathing  abdomen is soft, non tender  left upper ext warm, incision site CDI with dermabond.  palpable cephalic outflow thrill  No gross motor or sensory deficit to the left upper extremity  + left radial and brachial  .     Assessment:  • POD 1 s/p proximal radial to cephalic AVF, PAtent   - No motor/sensory/perfusion deficits       •   Plan:   • Continue access vial right Permacath till Left avf matures (~2months)  - No other planned vascular surgical intervention  - have patient follow up in the office 2-3 weeks post discharge to see Dr. Roberts

## 2019-08-27 NOTE — PROGRESS NOTE ADULT - PROBLEM SELECTOR PLAN 4
Hold Metformin-Elevated BUN/Cr.  FS AC+HS with coverage.  Endocrine consult appreciated.  Continue Humalog.  Fingersticks are well controlled.  Plan likely to discharge on Januvia 25mg per Endocrine recommendations.

## 2019-08-27 NOTE — PROGRESS NOTE ADULT - SUBJECTIVE AND OBJECTIVE BOX
Patient seen and examined    I&O's Summary    26 Aug 2019 07:01  -  27 Aug 2019 07:00  --------------------------------------------------------  IN: 460 mL / OUT: 600 mL / NET: -140 mL    27 Aug 2019 07:01  -  27 Aug 2019 13:46  --------------------------------------------------------  IN: 720 mL / OUT: 350 mL / NET: 370 mL    REVIEW OF SYSTEMS:    CONSTITUTIONAL: No F/C  RESPIRATORY: No cough or SOB  CARDIOVASCULAR: No CP/palpitations,    GASTROINTESTINAL: No abdominal pain , NVD   GENITOURINARY: No UTI sx  NEUROLOGICAL: No headaches/wk/numbness  MUSCULOSKELETAL:  No joint pain/swelling; No LBP  EXTREMITIES : no swelling,    Vital Signs Last 24 Hrs  T(C): 37.1 (27 Aug 2019 10:39), Max: 37.1 (27 Aug 2019 10:39)  T(F): 98.7 (27 Aug 2019 10:39), Max: 98.7 (27 Aug 2019 10:39)  HR: 69 (27 Aug 2019 10:39) (50 - 73)  BP: 129/71 (27 Aug 2019 10:39) (90/48 - 166/76)  BP(mean): --  RR: 18 (27 Aug 2019 10:39) (10 - 18)  SpO2: 98% (27 Aug 2019 10:39) (97% - 100%)    PHYSICAL EXAM:    GENERAL: NAD, Pale,  + AVF,  EYES:  conjunctiva and sclera clear  NECK: Supple, No JVD/Bruit  NERVOUS SYSTEM:  A/O x3,   CHEST:  CTA ,No rales or rhonchi  HEART:  RRR, + Systolic  murmur,   ABDOMEN: Soft, NT/ND BS+  EXTREMITIES:  No Edema;  SKIN: No rashes    LABS:                        11.9   12.82 )-----------( 245      ( 27 Aug 2019 06:17 )             38.1     08-27    138  |  100  |  66.0<H>  ----------------------------<  111  4.6   |  23.0  |  5.47<H>    Ca    8.3<L>      27 Aug 2019 06:17  Phos  5.0     08-26  Mg     1.9     08-26    TPro  6.8  /  Alb  3.3  /  TBili  0.3<L>  /  DBili  x   /  AST  15  /  ALT  13  /  AlkPhos  88  08-27    MEDICATIONS  (STANDING):  ALBUTerol/ipratropium for Nebulization. PRN  aspirin enteric coated  atorvastatin  buDESOnide 160 MICROgram(s)/formoterol 4.5 MICROgram(s) Inhaler  calcium carbonate   1250 mG (OsCal)  dextrose 40% Gel PRN  dextrose 5%.  dextrose 50% Injectable  dextrose 50% Injectable  dextrose 50% Injectable  glucagon  Injectable PRN  insulin lispro (HumaLOG) corrective regimen sliding scale  insulin lispro Injectable (HumaLOG)  metoprolol tartrate  paricalcitol Injectable  sevelamer carbonate  sodium chloride 0.9% lock flush      S/P AVF,

## 2019-08-27 NOTE — PROGRESS NOTE ADULT - PROBLEM SELECTOR PLAN 8
Will followup with vascular regarding reordering Heparin SQ for DVT prophylaxis.  It had been held due to AVF yesterday.    Awaiting dialysis seat in the community.  Discussed with CT surgery team in AM rounds.

## 2019-08-27 NOTE — PROGRESS NOTE ADULT - PROBLEM SELECTOR PLAN 1
8/20: Transferred from [facility] [place] for CABG workup  likely d/c home when optimized/seat available and return as outpt for elective surgery  Continue asa, lipitor and lopressor  Replete magnesium hypomagnesmia

## 2019-08-27 NOTE — PROGRESS NOTE ADULT - SUBJECTIVE AND OBJECTIVE BOX
Subjective: Pt sitting up in chair.  Denies SOB or CP.  NAD noted.      Tele: SB-SR                       T(F): 97.5 (19 @ 05:28), Max: 98.7 (19 @ 11:00)  HR: 70 (19 @ 08:33) (50 - 73)  BP: 166/76 (19 @ 05:28) (90/48 - 166/76)  RR: 18 (19 @ 05:28) (10 - 18)  SpO2: 100% (19 @ 05:28) (97% - 100%) on room air at rest.    Weight (kg): 86.4 ( @ 15:15)    Daily Height in cm: 5 (26 Aug 2019 15:09)    Daily Weight in k.3 (27 Aug 2019 04:56)    LV EF: 35%    lactose (Flatulence (Severe); Diarrhea (Severe); Stomach Upset (Severe); Nausea (Severe))  No Known Drug Allergies        138  |  100  |  66.0<H>  ----------------------------<  111  4.6   |  23.0  |  5.47<H>    Ca    8.3<L>      27 Aug 2019 06:17  Phos  5.0       Mg     1.9         TPro  6.8  /  Alb  3.3  /  TBili  0.3<L>  /  DBili  x   /  AST  15  /  ALT  13  /  AlkPhos  88                                 11.9   12.82 )-----------( 245      ( 27 Aug 2019 06:17 )             38.1        PT/INR - ( 26 Aug 2019 05:55 )   PT: 11.6 sec;   INR: 1.01 ratio         PTT - ( 26 Aug 2019 05:55 )  PTT:28.7 sec           CAPILLARY BLOOD GLUCOSE  POCT Blood Glucose.: 124 mg/dL (27 Aug 2019 07:59)  POCT Blood Glucose.: 116 mg/dL (26 Aug 2019 22:52)  POCT Blood Glucose.: 139 mg/dL (26 Aug 2019 18:55)  POCT Blood Glucose.: 128 mg/dL (26 Aug 2019 12:12)         I&O's Detail    26 Aug 2019 07:01  -  27 Aug 2019 07:00  --------------------------------------------------------  IN:    Oral Fluid: 60 mL    Other: 400 mL  Total IN: 460 mL    OUT:    Voided: 600 mL  Total OUT: 600 mL    Total NET: -140 mL      Active Medications:  ALBUTerol/ipratropium for Nebulization. 3 milliLiter(s) Nebulizer once PRN  aspirin enteric coated 81 milliGRAM(s) Oral daily  atorvastatin 40 milliGRAM(s) Oral at bedtime  buDESOnide 160 MICROgram(s)/formoterol 4.5 MICROgram(s) Inhaler 2 Puff(s) Inhalation two times a day  calcium carbonate   1250 mG (OsCal) 2 Tablet(s) Oral two times a day  dextrose 40% Gel 15 Gram(s) Oral once PRN  dextrose 5%. 1000 milliLiter(s) IV Continuous <Continuous>  dextrose 50% Injectable 12.5 Gram(s) IV Push once  dextrose 50% Injectable 25 Gram(s) IV Push once  dextrose 50% Injectable 25 Gram(s) IV Push once  glucagon  Injectable 1 milliGRAM(s) IntraMuscular once PRN  insulin lispro (HumaLOG) corrective regimen sliding scale   SubCutaneous Before meals and at bedtime  insulin lispro Injectable (HumaLOG) 2 Unit(s) SubCutaneous three times a day before meals  metoprolol tartrate 25 milliGRAM(s) Oral every 12 hours  paricalcitol Injectable 2 MICROGram(s) IV Push <User Schedule>  sevelamer carbonate 800 milliGRAM(s) Oral three times a day with meals  sodium chloride 0.9% lock flush 3 milliLiter(s) IV Push every 8 hours      Physical Exam:    Neuro:     Pulm:     CV:     Abd:     Extremities:    Incision(s):                           PAST MEDICAL & SURGICAL HISTORY:  HLD (hyperlipidemia)  Cerebrovascular accident (CVA): Poor historian, unaware of date  DM2 (diabetes mellitus, type 2)  HTN (hypertension)  No significant past surgical history Subjective: Pt sitting up in chair.  Denies SOB or CP.  NAD noted.      Tele: SB-SR                       T(F): 97.5 (19 @ 05:28), Max: 98.7 (19 @ 11:00)  HR: 70 (19 @ 08:33) (50 - 73)  BP: 166/76 (19 @ 05:28) (90/48 - 166/76)  RR: 18 (19 @ 05:28) (10 - 18)  SpO2: 100% (19 @ 05:28) (97% - 100%) on room air at rest.    Weight (kg): 86.4 ( @ 15:15)    Daily Height in cm: 5 (26 Aug 2019 15:09)    Daily Weight in k.3 (27 Aug 2019 04:56)    LV EF: 35%    lactose (Flatulence (Severe); Diarrhea (Severe); Stomach Upset (Severe); Nausea (Severe))  No Known Drug Allergies        138  |  100  |  66.0<H>  ----------------------------<  111  4.6   |  23.0  |  5.47<H>    Ca    8.3<L>      27 Aug 2019 06:17  Phos  5.0       Mg     1.9         TPro  6.8  /  Alb  3.3  /  TBili  0.3<L>  /  DBili  x   /  AST  15  /  ALT  13  /  AlkPhos  88                                 11.9   12.82 )-----------( 245      ( 27 Aug 2019 06:17 )             38.1        PT/INR - ( 26 Aug 2019 05:55 )   PT: 11.6 sec;   INR: 1.01 ratio         PTT - ( 26 Aug 2019 05:55 )  PTT:28.7 sec           CAPILLARY BLOOD GLUCOSE  POCT Blood Glucose.: 124 mg/dL (27 Aug 2019 07:59)  POCT Blood Glucose.: 116 mg/dL (26 Aug 2019 22:52)  POCT Blood Glucose.: 139 mg/dL (26 Aug 2019 18:55)  POCT Blood Glucose.: 128 mg/dL (26 Aug 2019 12:12)         I&O's Detail    26 Aug 2019 07:01  -  27 Aug 2019 07:00  --------------------------------------------------------  IN:    Oral Fluid: 60 mL    Other: 400 mL  Total IN: 460 mL    OUT:    Voided: 600 mL  Total OUT: 600 mL    Total NET: -140 mL      Active Medications:  ALBUTerol/ipratropium for Nebulization. 3 milliLiter(s) Nebulizer once PRN  aspirin enteric coated 81 milliGRAM(s) Oral daily  atorvastatin 40 milliGRAM(s) Oral at bedtime  buDESOnide 160 MICROgram(s)/formoterol 4.5 MICROgram(s) Inhaler 2 Puff(s) Inhalation two times a day  calcium carbonate   1250 mG (OsCal) 2 Tablet(s) Oral two times a day  dextrose 40% Gel 15 Gram(s) Oral once PRN  dextrose 5%. 1000 milliLiter(s) IV Continuous <Continuous>  dextrose 50% Injectable 12.5 Gram(s) IV Push once  dextrose 50% Injectable 25 Gram(s) IV Push once  dextrose 50% Injectable 25 Gram(s) IV Push once  glucagon  Injectable 1 milliGRAM(s) IntraMuscular once PRN  insulin lispro (HumaLOG) corrective regimen sliding scale   SubCutaneous Before meals and at bedtime  insulin lispro Injectable (HumaLOG) 2 Unit(s) SubCutaneous three times a day before meals  metoprolol tartrate 25 milliGRAM(s) Oral every 12 hours  paricalcitol Injectable 2 MICROGram(s) IV Push <User Schedule>  sevelamer carbonate 800 milliGRAM(s) Oral three times a day with meals  sodium chloride 0.9% lock flush 3 milliLiter(s) IV Push every 8 hours      Physical Exam:    Neuro: AAOX3.  Non focal.    Pulm: Diminished.      CV: RRR.  +S1+S2.    Abd: Soft/NT/ND.  +BS.     Extremities: No edema.  +pp.    Incisions:  Left upper arm AVF site PHILIP and healing well.  + thrill.  No drainage.                   PAST MEDICAL & SURGICAL HISTORY:  HLD (hyperlipidemia)  Cerebrovascular accident (CVA): Poor historian, unaware of date  DM2 (diabetes mellitus, type 2)  HTN (hypertension)  No significant past surgical history

## 2019-08-28 LAB
ALBUMIN SERPL ELPH-MCNC: 3 G/DL — LOW (ref 3.3–5.2)
ALP SERPL-CCNC: 92 U/L — SIGNIFICANT CHANGE UP (ref 40–120)
ALT FLD-CCNC: <5 U/L — SIGNIFICANT CHANGE UP
ANION GAP SERPL CALC-SCNC: 16 MMOL/L — SIGNIFICANT CHANGE UP (ref 5–17)
AST SERPL-CCNC: 12 U/L — SIGNIFICANT CHANGE UP
BILIRUB SERPL-MCNC: 0.2 MG/DL — LOW (ref 0.4–2)
BUN SERPL-MCNC: 44 MG/DL — HIGH (ref 8–20)
CALCIUM SERPL-MCNC: 7.8 MG/DL — LOW (ref 8.6–10.2)
CHLORIDE SERPL-SCNC: 94 MMOL/L — LOW (ref 98–107)
CO2 SERPL-SCNC: 23 MMOL/L — SIGNIFICANT CHANGE UP (ref 22–29)
CREAT SERPL-MCNC: 3.4 MG/DL — HIGH (ref 0.5–1.3)
GLUCOSE BLDC GLUCOMTR-MCNC: 124 MG/DL — HIGH (ref 70–99)
GLUCOSE BLDC GLUCOMTR-MCNC: 130 MG/DL — HIGH (ref 70–99)
GLUCOSE BLDC GLUCOMTR-MCNC: 151 MG/DL — HIGH (ref 70–99)
GLUCOSE BLDC GLUCOMTR-MCNC: 164 MG/DL — HIGH (ref 70–99)
GLUCOSE SERPL-MCNC: 111 MG/DL — SIGNIFICANT CHANGE UP (ref 70–115)
HCT VFR BLD CALC: 33.7 % — LOW (ref 39–50)
HGB BLD-MCNC: 10.5 G/DL — LOW (ref 13–17)
MAGNESIUM SERPL-MCNC: 2 MG/DL — SIGNIFICANT CHANGE UP (ref 1.8–2.6)
MCHC RBC-ENTMCNC: 25.8 PG — LOW (ref 27–34)
MCHC RBC-ENTMCNC: 31.2 GM/DL — LOW (ref 32–36)
MCV RBC AUTO: 82.8 FL — SIGNIFICANT CHANGE UP (ref 80–100)
PHOSPHATE SERPL-MCNC: 4 MG/DL — SIGNIFICANT CHANGE UP (ref 2.4–4.7)
PLATELET # BLD AUTO: 233 K/UL — SIGNIFICANT CHANGE UP (ref 150–400)
POTASSIUM SERPL-MCNC: 3.9 MMOL/L — SIGNIFICANT CHANGE UP (ref 3.5–5.3)
POTASSIUM SERPL-SCNC: 3.9 MMOL/L — SIGNIFICANT CHANGE UP (ref 3.5–5.3)
PROT SERPL-MCNC: 6.4 G/DL — LOW (ref 6.6–8.7)
RBC # BLD: 4.07 M/UL — LOW (ref 4.2–5.8)
RBC # FLD: 13.6 % — SIGNIFICANT CHANGE UP (ref 10.3–14.5)
SODIUM SERPL-SCNC: 133 MMOL/L — LOW (ref 135–145)
WBC # BLD: 11.84 K/UL — HIGH (ref 3.8–10.5)
WBC # FLD AUTO: 11.84 K/UL — HIGH (ref 3.8–10.5)

## 2019-08-28 PROCEDURE — 99232 SBSQ HOSP IP/OBS MODERATE 35: CPT

## 2019-08-28 PROCEDURE — 99233 SBSQ HOSP IP/OBS HIGH 50: CPT

## 2019-08-28 RX ORDER — HEPARIN SODIUM 5000 [USP'U]/ML
5000 INJECTION INTRAVENOUS; SUBCUTANEOUS EVERY 8 HOURS
Refills: 0 | Status: DISCONTINUED | OUTPATIENT
Start: 2019-08-28 | End: 2019-08-29

## 2019-08-28 RX ADMIN — Medication 25 MILLIGRAM(S): at 17:35

## 2019-08-28 RX ADMIN — SEVELAMER CARBONATE 800 MILLIGRAM(S): 2400 POWDER, FOR SUSPENSION ORAL at 17:34

## 2019-08-28 RX ADMIN — Medication 1: at 21:35

## 2019-08-28 RX ADMIN — Medication 81 MILLIGRAM(S): at 12:25

## 2019-08-28 RX ADMIN — Medication 2 UNIT(S): at 12:25

## 2019-08-28 RX ADMIN — SODIUM CHLORIDE 3 MILLILITER(S): 9 INJECTION INTRAMUSCULAR; INTRAVENOUS; SUBCUTANEOUS at 12:28

## 2019-08-28 RX ADMIN — Medication 1: at 12:25

## 2019-08-28 RX ADMIN — HEPARIN SODIUM 5000 UNIT(S): 5000 INJECTION INTRAVENOUS; SUBCUTANEOUS at 21:35

## 2019-08-28 RX ADMIN — Medication 2 TABLET(S): at 05:25

## 2019-08-28 RX ADMIN — Medication 2 UNIT(S): at 17:35

## 2019-08-28 RX ADMIN — Medication 2 UNIT(S): at 08:39

## 2019-08-28 RX ADMIN — SEVELAMER CARBONATE 800 MILLIGRAM(S): 2400 POWDER, FOR SUSPENSION ORAL at 08:40

## 2019-08-28 RX ADMIN — ATORVASTATIN CALCIUM 40 MILLIGRAM(S): 80 TABLET, FILM COATED ORAL at 21:35

## 2019-08-28 RX ADMIN — Medication 2 TABLET(S): at 17:34

## 2019-08-28 RX ADMIN — SEVELAMER CARBONATE 800 MILLIGRAM(S): 2400 POWDER, FOR SUSPENSION ORAL at 12:25

## 2019-08-28 RX ADMIN — SODIUM CHLORIDE 3 MILLILITER(S): 9 INJECTION INTRAMUSCULAR; INTRAVENOUS; SUBCUTANEOUS at 21:39

## 2019-08-28 RX ADMIN — SODIUM CHLORIDE 3 MILLILITER(S): 9 INJECTION INTRAMUSCULAR; INTRAVENOUS; SUBCUTANEOUS at 05:24

## 2019-08-28 RX ADMIN — BUDESONIDE AND FORMOTEROL FUMARATE DIHYDRATE 2 PUFF(S): 160; 4.5 AEROSOL RESPIRATORY (INHALATION) at 10:13

## 2019-08-28 RX ADMIN — BUDESONIDE AND FORMOTEROL FUMARATE DIHYDRATE 2 PUFF(S): 160; 4.5 AEROSOL RESPIRATORY (INHALATION) at 20:42

## 2019-08-28 NOTE — PROGRESS NOTE ADULT - REASON FOR ADMISSION
CAD-Preop CABG

## 2019-08-28 NOTE — PROGRESS NOTE ADULT - SUBJECTIVE AND OBJECTIVE BOX
[facility] DIVISION OF KIDNEY DISEASES AND HYPERTENSION -- FOLLOW UP NOTE  --------------------------------------------------------------------------------  Chief Complaint:  ESRD HD  24 hour events/subjective:  Seen/examined  Plan for HD in AM      PAST HISTORY  --------------------------------------------------------------------------------  No significant changes to PMH, PSH, FHx, SHx, unless otherwise noted    ALLERGIES & MEDICATIONS  --------------------------------------------------------------------------------  Allergies    lactose (Flatulence (Severe); Diarrhea (Severe); Stomach Upset (Severe); Nausea (Severe))  No Known Drug Allergies    Intolerances      Standing Inpatient Medications  aspirin enteric coated 81 milliGRAM(s) Oral daily  atorvastatin 40 milliGRAM(s) Oral at bedtime  buDESOnide 160 MICROgram(s)/formoterol 4.5 MICROgram(s) Inhaler 2 Puff(s) Inhalation two times a day  calcium carbonate   1250 mG (OsCal) 2 Tablet(s) Oral two times a day  dextrose 5%. 1000 milliLiter(s) IV Continuous <Continuous>  dextrose 50% Injectable 12.5 Gram(s) IV Push once  dextrose 50% Injectable 25 Gram(s) IV Push once  dextrose 50% Injectable 25 Gram(s) IV Push once  heparin  Injectable 5000 Unit(s) SubCutaneous every 8 hours  insulin lispro (HumaLOG) corrective regimen sliding scale   SubCutaneous Before meals and at bedtime  insulin lispro Injectable (HumaLOG) 2 Unit(s) SubCutaneous three times a day before meals  metoprolol tartrate 25 milliGRAM(s) Oral every 12 hours  paricalcitol Injectable 2 MICROGram(s) IV Push <User Schedule>  sevelamer carbonate 800 milliGRAM(s) Oral three times a day with meals  sodium chloride 0.9% lock flush 3 milliLiter(s) IV Push every 8 hours    PRN Inpatient Medications  ALBUTerol/ipratropium for Nebulization. 3 milliLiter(s) Nebulizer once PRN  dextrose 40% Gel 15 Gram(s) Oral once PRN  glucagon  Injectable 1 milliGRAM(s) IntraMuscular once PRN      REVIEW OF SYSTEMS  --------------------------------------------------------------------------------  Gen: No weight changes, fatigue, fevers/chills, weakness  Skin: No rashes  Head/Eyes/Ears/Mouth: No headache; Normal hearing; Normal vision w/o blurriness; No sinus pain/discomfort, sore throat  Respiratory: No dyspnea, cough, wheezing, hemoptysis  CV: No chest pain, PND, orthopnea  GI: No abdominal pain, diarrhea, constipation, nausea, vomiting, melena, hematochezia  : No increased frequency, dysuria, hematuria, nocturia  MSK: No joint pain/swelling; no back pain; no edema  Neuro: No dizziness/lightheadedness, weakness, seizures, numbness, tingling  Heme: No easy bruising or bleeding  Endo: No heat/cold intolerance  Psych: No significant nervousness, anxiety, stress, depression    All other systems were reviewed and are negative, except as noted.    VITALS/PHYSICAL EXAM  --------------------------------------------------------------------------------  T(C): 37.1 (08-28-19 @ 16:01), Max: 37.1 (08-28-19 @ 11:00)  HR: 80 (08-28-19 @ 20:43) (69 - 80)  BP: 142/73 (08-28-19 @ 16:01) (101/67 - 142/73)  RR: 18 (08-28-19 @ 16:01) (17 - 19)  SpO2: 99% (08-28-19 @ 20:43) (95% - 100%)  Wt(kg): --        08-27-19 @ 07:01  -  08-28-19 @ 07:00  --------------------------------------------------------  IN: 720 mL / OUT: 550 mL / NET: 170 mL    08-28-19 @ 07:01  -  08-28-19 @ 22:11  --------------------------------------------------------  IN: 480 mL / OUT: 1100 mL / NET: -620 mL      Physical Exam:  GENERAL: NAD, Pale,  + AVF,  EYES:  conjunctiva and sclera clear  NECK: Supple, No JVD/Bruit  NERVOUS SYSTEM:  A/O x3,   CHEST:  CTA ,No rales or rhonchi  HEART:  RRR, + Systolic  murmur,   ABDOMEN: Soft, NT/ND BS+  EXTREMITIES:  No Edema;  SKIN: No rashes    LABS/STUDIES  --------------------------------------------------------------------------------              10.5   11.84 >-----------<  233      [08-28-19 @ 06:05]              33.7     133  |  94  |  44.0  ----------------------------<  111      [08-28-19 @ 06:05]  3.9   |  23.0  |  3.40        Ca     7.8     [08-28-19 @ 06:05]      Mg     2.0     [08-28-19 @ 06:05]      Phos  4.0     [08-28-19 @ 06:05]    TPro  6.4  /  Alb  3.0  /  TBili  0.2  /  DBili  x   /  AST  12  /  ALT  <5  /  AlkPhos  92  [08-28-19 @ 06:05]          Creatinine Trend:  SCr 3.40 [08-28 @ 06:05]  SCr 5.47 [08-27 @ 06:17]  SCr 5.12 [08-26 @ 05:55]  SCr 4.77 [08-24 @ 06:32]  SCr 5.01 [08-23 @ 05:46]    Urinalysis - [08-20-19 @ 13:32]      Color Yellow / Appearance Clear / SG 1.005 / pH 7.0      Gluc Negative / Ketone Negative  / Bili Negative / Urobili Negative       Blood Small / Protein 100 / Leuk Est Negative / Nitrite Negative      RBC 3-5 / WBC 0-2 / Hyaline  / Gran  / Sq Epi  / Non Sq Epi Occasional / Bacteria Occasional      Iron 37, TIBC 204, %sat 18      [08-20-19 @ 15:20]  Ferritin 305      [08-20-19 @ 15:20]  PTH -- (Ca 6.9)      [08-20-19 @ 23:33]   456  Vitamin D (25OH) 11.3      [08-21-19 @ 00:00]  HbA1c 8.4      [08-20-19 @ 13:09]  TSH 1.97      [08-20-19 @ 13:09]  Lipid: chol 209, , HDL 32,       [08-20-19 @ 13:09]    HBsAb <3.0      [08-21-19 @ 22:09]  HBsAg Nonreact      [08-21-19 @ 22:09]  HBcAb Nonreact      [08-21-19 @ 22:09]  HCV 0.12, Nonreact      [08-21-19 @ 17:20]

## 2019-08-28 NOTE — PROGRESS NOTE ADULT - ASSESSMENT
60y/o M, active smoker, with PMHx of DM2, HTN, HLD, prior CVA (Poor historian-Unclear of time, Reports no sensory/motor deficits), sciatica (Intermit Lt lower ext numbness), history of medication non-compliance.   [place]17: [place] [facility] [place] onset of [place]B with concurrent chest discomfort while performing yard work. Admitted with HTN emergency, acute on CKD. + NSTEMI  8[place]20 [place] [facility] [place] LHC revealed multivessel CAD (dLAD 90%, D1 80%, pCXF 70%, mCXF 70%, OM1 80%, mRCA, 95%, RPDLS 70%) for which pt will need CABG.     During HD at [facility], pt noted to be obtunded, found to be hypotensive, IVF resuscitation given, BP improved, and mental status returned to baseline.  8/21 dialysis for clearance only, no fluid removed. Vascular surgery consulted (R chest wall permacath may need to be removed d/t non-occlusive thrombus seen, preserve LUE for eventual AVF).  8/22 tentative AVF on Monday 8/26 with vascular

## 2019-08-28 NOTE — PROGRESS NOTE ADULT - PROBLEM SELECTOR PLAN 1
8/20: Transferred from [facility] [place] for CABG workup  likely d/c home when optimized/seat available and return as outpt for elective surgery  Continue asa, lipitor and lopressor  Replete magnesium hypomagnesmia

## 2019-08-28 NOTE — PROGRESS NOTE ADULT - PROBLEM SELECTOR PLAN 8
SQ heparin for dvt prophylaxis.    Awaiting dialysis seat in the community.  Discussed with CT surgery team in AM rounds.

## 2019-08-28 NOTE — PROGRESS NOTE ADULT - ASSESSMENT
S/P AVF,     Assessment and Plan:   · Assessment		  60y/o M, active smoker, with PMHx of DM2, HTN, HLD, prior CVA (Poor historian-Unclear of time, Reports no sensory/motor deficits), sciatica (Intermit Lt lower ext numbness), history of medication non-compliance.   8/17: presented to [facility] [place]/ c/o sudden onset of SOB with concurrent chest discomfort while performing yard work. Admitted with HTN emergency, acute on CKD. + NSTEMI  8/20 transferred from [facility] [place] after subsequent LHC revealed multivessel CAD (dLAD 90%, D1 80%, pCXF 70%, mCXF 70%, OM1 80%, mRCA, 95%, RPDLS 70%) for which pt will need CABG.     During HD at [facility], pt noted to be obtunded, found to be hypotensive, IVF resuscitation given, BP improved, and mental status returned to baseline.  8/21 dialysis for clearance only, no fluid removed. Vascular surgery consulted (R chest wall permacath may need to be removed d/t non-occlusive thrombus seen, preserve LUE for eventual AVF).  8/22 tentative AVF on Monday 8/26 with vascular      Problem/Plan - 1:  ·  Problem: CAD (coronary artery disease).  Plan: 8/20: Transferred from [facility] [place] for CABG workup  likely d/c home when optimized,    Elective OHS,        Problem/Plan - 2:  ·  Problem: ESRD,  Plan: s/p Left AVF POD #1.       Problem/Plan - 3:  ·  Problem: Acute combined systolic and diastolic heart failure.         Problem/Plan - 4:  ·  Problem: DM2 (diabetes mellitus, type 2).  Plan: Hold Metformin,  FS AC+HS with coverage.  Continue Humalog.      Awaiting dialysis seat in the community.    left upper ext warm, incision site CDI with Dermabond  palpable cephalic outflow thrill  No gross motor or sensory deficit to the left upper extremity  + left radial and brachial  .     Assessment:  • POD 1 s/p proximal radial to cephalic AVF, Patent   - No motor/sensory/perfusion deficits     Plan:   • Continue access vial right TDC  till Left avf matures (~2months)    HD in AM

## 2019-08-28 NOTE — PROGRESS NOTE ADULT - SUBJECTIVE AND OBJECTIVE BOX
Subjective: PT sitting up in bed.  Denies SOB or CP.  NAD noted.      Tele: SR                          T(F): 98.7 (19 @ 11:00), Max: 98.7 (19 @ 11:00)  HR: 71 (19 @ 11:00) (67 - 79)  BP: 101/67 (19 @ 11:00) (101/67 - 154/60)  RR: 18 (19 @ 11:00) (17 - 18)  SpO2: 97% (19 @ 11:00) (95% - 100%) on room air at rest.      Daily     Daily Weight in k.4 (28 Aug 2019 05:22)    LV EF: 35%    lactose (Flatulence (Severe); Diarrhea (Severe); Stomach Upset (Severe); Nausea (Severe))  No Known Drug Allergies          133<L>  |  94<L>  |  44.0<H>  ----------------------------<  111  3.9   |  23.0  |  3.40<H>    Ca    7.8<L>      28 Aug 2019 06:05  Phos  4.0       Mg     2.0         TPro  6.4<L>  /  Alb  3.0<L>  /  TBili  0.2<L>  /  DBili  x   /  AST  12  /  ALT  <5  /  AlkPhos  92                                 10.5   11.84 )-----------( 233      ( 28 Aug 2019 06:05 )             33.7               CAPILLARY BLOOD GLUCOSE  POCT Blood Glucose.: 164 mg/dL (28 Aug 2019 12:22)  POCT Blood Glucose.: 130 mg/dL (28 Aug 2019 08:15)  POCT Blood Glucose.: 151 mg/dL (27 Aug 2019 21:57)  POCT Blood Glucose.: 124 mg/dL (27 Aug 2019 17:55)  POCT Blood Glucose.: 125 mg/dL (27 Aug 2019 16:32)           CXR: na    I&O's Detail    27 Aug 2019 07:01  -  28 Aug 2019 07:00  --------------------------------------------------------  IN:    Oral Fluid: 720 mL  Total IN: 720 mL    OUT:    Voided: 550 mL  Total OUT: 550 mL    Total NET: 170 mL        Active Medications:  ALBUTerol/ipratropium for Nebulization. 3 milliLiter(s) Nebulizer once PRN  aspirin enteric coated 81 milliGRAM(s) Oral daily  atorvastatin 40 milliGRAM(s) Oral at bedtime  buDESOnide 160 MICROgram(s)/formoterol 4.5 MICROgram(s) Inhaler 2 Puff(s) Inhalation two times a day  calcium carbonate   1250 mG (OsCal) 2 Tablet(s) Oral two times a day  dextrose 40% Gel 15 Gram(s) Oral once PRN  dextrose 5%. 1000 milliLiter(s) IV Continuous <Continuous>  dextrose 50% Injectable 12.5 Gram(s) IV Push once  dextrose 50% Injectable 25 Gram(s) IV Push once  dextrose 50% Injectable 25 Gram(s) IV Push once  glucagon  Injectable 1 milliGRAM(s) IntraMuscular once PRN  insulin lispro (HumaLOG) corrective regimen sliding scale   SubCutaneous Before meals and at bedtime  insulin lispro Injectable (HumaLOG) 2 Unit(s) SubCutaneous three times a day before meals  metoprolol tartrate 25 milliGRAM(s) Oral every 12 hours  paricalcitol Injectable 2 MICROGram(s) IV Push <User Schedule>  sevelamer carbonate 800 milliGRAM(s) Oral three times a day with meals  sodium chloride 0.9% lock flush 3 milliLiter(s) IV Push every 8 hours      Physical Exam:    Neuro: AAOX3.  Non focal.    Pulm: Diminished.      CV: RRR.  +S1+S2.    Abd: Soft/NT/ND.  +BS.     Extremities: No edema.  +pp.    Incisions:  Left upper arm AVF site PHILIP and healing well.  + thrill.  No drainage.                            PAST MEDICAL & SURGICAL HISTORY:  HLD (hyperlipidemia)  Cerebrovascular accident (CVA): Poor historian, unaware of date  DM2 (diabetes mellitus, type 2)  HTN (hypertension)  No significant past surgical history

## 2019-08-29 ENCOUNTER — TRANSCRIPTION ENCOUNTER (OUTPATIENT)
Age: 59
End: 2019-08-29

## 2019-08-29 VITALS
DIASTOLIC BLOOD PRESSURE: 64 MMHG | HEART RATE: 74 BPM | RESPIRATION RATE: 18 BRPM | SYSTOLIC BLOOD PRESSURE: 147 MMHG | OXYGEN SATURATION: 100 % | TEMPERATURE: 99 F

## 2019-08-29 PROBLEM — E78.5 HYPERLIPIDEMIA, UNSPECIFIED: Chronic | Status: ACTIVE | Noted: 2019-08-20

## 2019-08-29 PROBLEM — I10 ESSENTIAL (PRIMARY) HYPERTENSION: Chronic | Status: ACTIVE | Noted: 2019-08-20

## 2019-08-29 PROBLEM — I63.9 CEREBRAL INFARCTION, UNSPECIFIED: Chronic | Status: ACTIVE | Noted: 2019-08-20

## 2019-08-29 PROBLEM — E11.9 TYPE 2 DIABETES MELLITUS WITHOUT COMPLICATIONS: Chronic | Status: ACTIVE | Noted: 2019-08-20

## 2019-08-29 LAB
ALBUMIN SERPL ELPH-MCNC: 3.2 G/DL — LOW (ref 3.3–5.2)
ALP SERPL-CCNC: 88 U/L — SIGNIFICANT CHANGE UP (ref 40–120)
ALT FLD-CCNC: <5 U/L — SIGNIFICANT CHANGE UP
ANION GAP SERPL CALC-SCNC: 15 MMOL/L — SIGNIFICANT CHANGE UP (ref 5–17)
AST SERPL-CCNC: 10 U/L — SIGNIFICANT CHANGE UP
BILIRUB SERPL-MCNC: <0.2 MG/DL — LOW (ref 0.4–2)
BUN SERPL-MCNC: 64 MG/DL — HIGH (ref 8–20)
CALCIUM SERPL-MCNC: 7.8 MG/DL — LOW (ref 8.6–10.2)
CHLORIDE SERPL-SCNC: 98 MMOL/L — SIGNIFICANT CHANGE UP (ref 98–107)
CO2 SERPL-SCNC: 22 MMOL/L — SIGNIFICANT CHANGE UP (ref 22–29)
CREAT SERPL-MCNC: 5.31 MG/DL — HIGH (ref 0.5–1.3)
GLUCOSE BLDC GLUCOMTR-MCNC: 126 MG/DL — HIGH (ref 70–99)
GLUCOSE BLDC GLUCOMTR-MCNC: 155 MG/DL — HIGH (ref 70–99)
GLUCOSE BLDC GLUCOMTR-MCNC: 162 MG/DL — HIGH (ref 70–99)
GLUCOSE SERPL-MCNC: 126 MG/DL — HIGH (ref 70–115)
HCT VFR BLD CALC: 34.5 % — LOW (ref 39–50)
HGB BLD-MCNC: 10.8 G/DL — LOW (ref 13–17)
MAGNESIUM SERPL-MCNC: 1.9 MG/DL — SIGNIFICANT CHANGE UP (ref 1.6–2.6)
MCHC RBC-ENTMCNC: 25.7 PG — LOW (ref 27–34)
MCHC RBC-ENTMCNC: 31.3 GM/DL — LOW (ref 32–36)
MCV RBC AUTO: 81.9 FL — SIGNIFICANT CHANGE UP (ref 80–100)
PLATELET # BLD AUTO: 235 K/UL — SIGNIFICANT CHANGE UP (ref 150–400)
POTASSIUM SERPL-MCNC: 4.2 MMOL/L — SIGNIFICANT CHANGE UP (ref 3.5–5.3)
POTASSIUM SERPL-SCNC: 4.2 MMOL/L — SIGNIFICANT CHANGE UP (ref 3.5–5.3)
PROT SERPL-MCNC: 6.4 G/DL — LOW (ref 6.6–8.7)
RBC # BLD: 4.21 M/UL — SIGNIFICANT CHANGE UP (ref 4.2–5.8)
RBC # FLD: 13.5 % — SIGNIFICANT CHANGE UP (ref 10.3–14.5)
SODIUM SERPL-SCNC: 135 MMOL/L — SIGNIFICANT CHANGE UP (ref 135–145)
WBC # BLD: 12.02 K/UL — HIGH (ref 3.8–10.5)
WBC # FLD AUTO: 12.02 K/UL — HIGH (ref 3.8–10.5)

## 2019-08-29 PROCEDURE — 90937 HEMODIALYSIS REPEATED EVAL: CPT

## 2019-08-29 PROCEDURE — 99238 HOSP IP/OBS DSCHRG MGMT 30/<: CPT

## 2019-08-29 RX ORDER — ASPIRIN/CALCIUM CARB/MAGNESIUM 324 MG
1 TABLET ORAL
Qty: 30 | Refills: 1
Start: 2019-08-29 | End: 2019-10-27

## 2019-08-29 RX ORDER — ATORVASTATIN CALCIUM 80 MG/1
1 TABLET, FILM COATED ORAL
Qty: 30 | Refills: 1
Start: 2019-08-29 | End: 2019-10-27

## 2019-08-29 RX ORDER — SEVELAMER CARBONATE 2400 MG/1
1 POWDER, FOR SUSPENSION ORAL
Qty: 90 | Refills: 1
Start: 2019-08-29 | End: 2019-10-27

## 2019-08-29 RX ORDER — BUDESONIDE AND FORMOTEROL FUMARATE DIHYDRATE 160; 4.5 UG/1; UG/1
2 AEROSOL RESPIRATORY (INHALATION)
Qty: 1 | Refills: 0
Start: 2019-08-29 | End: 2019-09-27

## 2019-08-29 RX ORDER — LINAGLIPTIN 5 MG/1
1 TABLET, FILM COATED ORAL
Qty: 30 | Refills: 1
Start: 2019-08-29 | End: 2019-10-27

## 2019-08-29 RX ORDER — CALCIUM CARBONATE 500(1250)
2 TABLET ORAL
Qty: 120 | Refills: 1
Start: 2019-08-29 | End: 2019-10-27

## 2019-08-29 RX ORDER — METOPROLOL TARTRATE 50 MG
1 TABLET ORAL
Qty: 60 | Refills: 1
Start: 2019-08-29 | End: 2019-10-27

## 2019-08-29 RX ORDER — INSULIN LISPRO 100/ML
2 VIAL (ML) SUBCUTANEOUS
Qty: 1 | Refills: 0
Start: 2019-08-29 | End: 2019-09-27

## 2019-08-29 RX ADMIN — Medication 2 UNIT(S): at 08:35

## 2019-08-29 RX ADMIN — BUDESONIDE AND FORMOTEROL FUMARATE DIHYDRATE 2 PUFF(S): 160; 4.5 AEROSOL RESPIRATORY (INHALATION) at 09:13

## 2019-08-29 RX ADMIN — HEPARIN SODIUM 5000 UNIT(S): 5000 INJECTION INTRAVENOUS; SUBCUTANEOUS at 06:47

## 2019-08-29 RX ADMIN — Medication 1: at 18:09

## 2019-08-29 RX ADMIN — Medication 2 UNIT(S): at 18:09

## 2019-08-29 RX ADMIN — Medication 2 UNIT(S): at 12:55

## 2019-08-29 RX ADMIN — Medication 25 MILLIGRAM(S): at 18:09

## 2019-08-29 RX ADMIN — Medication 2 TABLET(S): at 18:09

## 2019-08-29 RX ADMIN — Medication 1: at 12:54

## 2019-08-29 RX ADMIN — Medication 2 TABLET(S): at 06:43

## 2019-08-29 RX ADMIN — SODIUM CHLORIDE 3 MILLILITER(S): 9 INJECTION INTRAMUSCULAR; INTRAVENOUS; SUBCUTANEOUS at 06:47

## 2019-08-29 RX ADMIN — Medication 81 MILLIGRAM(S): at 08:36

## 2019-08-29 RX ADMIN — SEVELAMER CARBONATE 800 MILLIGRAM(S): 2400 POWDER, FOR SUSPENSION ORAL at 08:36

## 2019-08-29 RX ADMIN — Medication 25 MILLIGRAM(S): at 06:43

## 2019-08-29 RX ADMIN — SODIUM CHLORIDE 3 MILLILITER(S): 9 INJECTION INTRAMUSCULAR; INTRAVENOUS; SUBCUTANEOUS at 14:02

## 2019-08-29 RX ADMIN — SEVELAMER CARBONATE 800 MILLIGRAM(S): 2400 POWDER, FOR SUSPENSION ORAL at 18:09

## 2019-08-29 RX ADMIN — SEVELAMER CARBONATE 800 MILLIGRAM(S): 2400 POWDER, FOR SUSPENSION ORAL at 14:01

## 2019-08-29 NOTE — PROGRESS NOTE ADULT - SUBJECTIVE AND OBJECTIVE BOX
Subjective: "I feel ok" Denies CP, SOB.     VITAL SIGNS  Vital Signs Last 24 Hrs  T(C): 36.8 (19 @ 10:17), Max: 37.1 (19 @ 11:00)  T(F): 98.3 (19 @ 10:17), Max: 98.8 (19 @ 16:01)  HR: 72 (19 @ 10:17) (64 - 80)  BP: 147/74 (19 @ 10:17) (101/67 - 148/80)  RR: 18 (19 @ 10:17) (18 - 19)  SpO2: 97% (19 @ 10:17) (97% - 99%)  on RA            Telemetry/Alarms:  SR 70, inverted T wave  LVEF: 35%    MEDICATIONS  ALBUTerol/ipratropium for Nebulization. 3 milliLiter(s) Nebulizer once PRN  aspirin enteric coated 81 milliGRAM(s) Oral daily  atorvastatin 40 milliGRAM(s) Oral at bedtime  buDESOnide 160 MICROgram(s)/formoterol 4.5 MICROgram(s) Inhaler 2 Puff(s) Inhalation two times a day  calcium carbonate   1250 mG (OsCal) 2 Tablet(s) Oral two times a day  dextrose 40% Gel 15 Gram(s) Oral once PRN  dextrose 5%. 1000 milliLiter(s) IV Continuous <Continuous>  dextrose 50% Injectable 12.5 Gram(s) IV Push once  dextrose 50% Injectable 25 Gram(s) IV Push once  dextrose 50% Injectable 25 Gram(s) IV Push once  glucagon  Injectable 1 milliGRAM(s) IntraMuscular once PRN  heparin  Injectable 5000 Unit(s) SubCutaneous every 8 hours  insulin lispro (HumaLOG) corrective regimen sliding scale   SubCutaneous Before meals and at bedtime  insulin lispro Injectable (HumaLOG) 2 Unit(s) SubCutaneous three times a day before meals  metoprolol tartrate 25 milliGRAM(s) Oral every 12 hours  paricalcitol Injectable 2 MICROGram(s) IV Push <User Schedule>  sevelamer carbonate 800 milliGRAM(s) Oral three times a day with meals  sodium chloride 0.9% lock flush 3 milliLiter(s) IV Push every 8 hours      PHYSICAL EXAM  General: well nourished, well developed, no acute distress  Neurology: alert and oriented x 3, nonfocal, no gross deficits  Respiratory: clear to auscultation bilaterally  CV: regular rate and rhythm, normal S1, S2  Abdomen: soft, nontender, nondistended, positive bowel sounds  Extremities: warm, well perfused. no edema. + DP pulses  L AVF + bruit       @ 07:01  -   @ 07:00  --------------------------------------------------------  IN: 600 mL / OUT: 2200 mL / NET: -1600 mL        Weights:  Daily     Daily Weight in k.2 (29 Aug 2019 05:18)  Admit Wt: Drug Dosing Weight  Height (cm): 5 (26 Aug 2019 15:09)  Weight (kg): 86.4 (26 Aug 2019 15:15)  BMI (kg/m2): 23571 (26 Aug 2019 15:15)  BSA (m2): 0.15 (26 Aug 2019 15:15)    All laboratory results, radiology and medications reviewed.    LABS      135  |  98  |  64.0<H>  ----------------------------<  126<H>  4.2   |  22.0  |  5.31<H>    Ca    7.8<L>      29 Aug 2019 05:51  Phos  4.0       Mg     1.9         TPro  6.4<L>  /  Alb  3.2<L>  /  TBili  <0.2<L>  /  DBili  x   /  AST  10  /  ALT  <5  /  AlkPhos  88                                   10.8   12.02 )-----------( 235      ( 29 Aug 2019 05:51 )             34.5            Bilirubin Total, Serum: <0.2 mg/dL ( @ 05:51)    CAPILLARY BLOOD GLUCOSE      POCT Blood Glucose.: 126 mg/dL (29 Aug 2019 08:01)  POCT Blood Glucose.: 151 mg/dL (28 Aug 2019 20:55)  POCT Blood Glucose.: 124 mg/dL (28 Aug 2019 16:55)  POCT Blood Glucose.: 164 mg/dL (28 Aug 2019 12:22)           Today's CXR:   < from: Xray Chest 1 View- PORTABLE-Routine (19 @ 05:05) >    FINDINGS:    Single frontal view of the chest demonstrates the lungs to be clear. The   cardiomediastinal silhouette is normal. No acute osseous abnormalities.   Overlying EKG leads and wires are noted. Right-sided dialysis catheter is   redemonstrated.    IMPRESSION: No acute cardiopulmonary disease process.    < end of copied text >    Today's EKG: < from: 12 Lead ECG (19 @ 05:19) >      Diagnosis Line Sinus rhythm withfrequent Premature ventricular complexes -ventricular quadrigeminy  -lahb  Minimal voltage criteria for LVH, may be normal variant  Septal infarct , age undetermined  ST & T wave abnormality, consider inferior ischemia  ST & T wave abnormality, consider anterolateral ischemia  Prolonged QT  Abnormal ECG      < end of copied text >      PAST MEDICAL & SURGICAL HISTORY:  HLD (hyperlipidemia)  Cerebrovascular accident (CVA): Poor historian, unaware of date  DM2 (diabetes mellitus, type 2)  HTN (hypertension)  No significant past surgical history

## 2019-08-29 NOTE — PROGRESS NOTE ADULT - PROBLEM SELECTOR PLAN 5
continue duoneb and symbicort.
Cont Lipitor
continue duoneb  symbicort added
continue duoneb and symbicort.
continue duoneb and symbicort.

## 2019-08-29 NOTE — DISCHARGE NOTE NURSING/CASE MANAGEMENT/SOCIAL WORK - NSDCPEWEB_GEN_ALL_CORE
[place] website --- [email or web address] website --- [email or web address]

## 2019-08-29 NOTE — DISCHARGE NOTE NURSING/CASE MANAGEMENT/SOCIAL WORK - PATIENT PORTAL LINK FT
You can access the FollowMyHealth Patient Portal offered by [facility] by registering at the following website: [email or web address]. By joining [facility]’s FollowMyHealth portal, you will also be able to view your health information using other applications (apps) compatible with our system.

## 2019-08-29 NOTE — ADVANCED PRACTICE NURSE CONSULT - RECOMMEDATIONS
after chart review, please consider increase lantus to 45units qhs and start matformin as tolerated.  cc- pls task pt to diabetes wellness out pt
continue diabetes self managment education  pls consider dc pt on trajenta 5 mg po daily ( requires pre autho)  cc- please task pt to diabetes welness out pt

## 2019-08-29 NOTE — DISCHARGE NOTE PROVIDER - CARE PROVIDER_API CALL
ManvarSingh, Pallavi B (MD)  Vascular Surgery  [address], [address] [place] [address]  Phone: [phone]  Fax: [phone]  Follow Up Time: 2 weeks    Ephraim Mims)  Surgery; Thoracic and Cardiac Surgery  [address] [place] [address]  Phone: [phone]  Fax: [phone]  Follow Up Time: 1 week

## 2019-08-29 NOTE — ADVANCED PRACTICE NURSE CONSULT - ASSESSMENT
went to see pt in am pt is a+ox3 c/o 0 pain. pt states that he has diabetes and was taking pills does not remember which ones. he does nt have a glucometer. a new glucometer provided.

## 2019-08-29 NOTE — DISCHARGE NOTE PROVIDER - CARE PROVIDERS DIRECT ADDRESSES
[email or web address]

## 2019-08-29 NOTE — PROGRESS NOTE ADULT - PROBLEM SELECTOR PROBLEM 2
Acute renal failure superimposed on chronic kidney disease, unspecified CKD stage, unspecified acute renal failure type
CKD (chronic kidney disease)
Acute renal failure superimposed on chronic kidney disease, unspecified CKD stage, unspecified acute renal failure type

## 2019-08-29 NOTE — DISCHARGE NOTE PROVIDER - NSDCACTIVITY_GEN_ALL_CORE
Do not make important decisions/Stairs allowed/Do not drive or operate machinery/Walking - Indoors allowed/Walking - Outdoors allowed/No heavy lifting/straining

## 2019-08-29 NOTE — PROGRESS NOTE ADULT - PROBLEM SELECTOR PROBLEM 4
DM2 (diabetes mellitus, type 2)
HTN (hypertension)
DM2 (diabetes mellitus, type 2)

## 2019-08-29 NOTE — DISCHARGE NOTE PROVIDER - NSDCCPCAREPLAN_GEN_ALL_CORE_FT
PRINCIPAL DISCHARGE DIAGNOSIS  Diagnosis: CAD (coronary artery disease)  Assessment and Plan of Treatment: Take all medications as prescribed. You will follow up with Dr Mims in 1-2 weeks for scheduling of open heart bypass surgery. Please call the office tomorrow for that appointment.      SECONDARY DISCHARGE DIAGNOSES  Diagnosis: HLD (hyperlipidemia)  Assessment and Plan of Treatment: Take all medications as prescribed.    Diagnosis: Acute combined systolic and diastolic heart failure  Assessment and Plan of Treatment: You are being discharged with a diagnosis of heart failure. To prevent exacerbation of congestive heart failure (CHF) it is important to follow a heart-healthy, LOW SODIUM diet. You should read all food labels and keep your sodium intake less than 1500 mg per day. Examples of high sodium foods include fast food or processed food (ie frozen TV dinners), chinese food, soup and regular cold cuts. You should also restrict your fluid intake to less than 1-1.5 liters per day. Please weigh yourself every day at the same time in the morning and document your weight in a weight log. Please call your doctor right away if you gain over 2-3 pounds in one day, or you notice an increase in leg swelling, abdominal swelling or shortness of breath. Making sure you take all of your medications as prescribed and maintaining a normal blood pressure are also important for preventing a CHF exacerbation.    Diagnosis: DM2 (diabetes mellitus, type 2)  Assessment and Plan of Treatment: It is extremely important to follow a diabetic (consistent carbohydrates, LOW/NO ADDED SUGAR) diet and monitor your glucose (sugar) levels. You have an increased risk of complications, including wound infections, due to the diabetes.  1. Check your glucoses 3-4 times daily before meals and bedtime.   2. Keep a log of your glucose levels every day.   3. Make an appointment to meet with your primary care provider or endocrinologist within a week.   4. Take ALL of your medications as prescribed. Only hold medications for low glucose levels (< 80) or if you are symptomatic (dizzy, sweating, lightheaded).  5. Ideally, we would like all of the glucose levels to be between .   You may have been discharged on different medications than you were taking before. Your body reacts differently to the medications after surgery. Please continue to take the medications as prescribed and notify the office, nurse practitioner or your PCP if you have any concerns right away.    Diagnosis: Acute renal failure superimposed on chronic kidney disease, unspecified CKD stage, unspecified acute renal failure type  Assessment and Plan of Treatment: Pleave follow up with your renal doctor and for dialysis (3:30pm M-W-F at [facility]).

## 2019-08-29 NOTE — DISCHARGE NOTE PROVIDER - NSDCFUADDAPPT_GEN_ALL_CORE_FT
[facility] 3:30 PM MONDAY, WEDNESDAY, FRIDAY   [address] [facility] [address]

## 2019-08-29 NOTE — DISCHARGE NOTE NURSING/CASE MANAGEMENT/SOCIAL WORK - NSDCPEPTSTRK_GEN_ALL_CORE
Stroke education booklet/Risk factors for stroke/Stroke support groups for patients, families, and friends/Stroke warning signs and symptoms/Need for follow up after discharge/Prescribed medications/Call 911 for stroke/Signs and symptoms of stroke

## 2019-08-29 NOTE — PROGRESS NOTE ADULT - PROBLEM SELECTOR PLAN 6
titrate lopressor
Continue post-op GI, DVT, abx prophylaxis
beta-blocker as above
titrate lopressor

## 2019-08-29 NOTE — PROGRESS NOTE ADULT - PROBLEM SELECTOR PLAN 2
s/p Left AVF POD #2.  Continue daily assessment to evaluate need for dialysis/clearance  Hold nephrotoxic meds  Trend urine output  Trend BMP
Renal consult for planned HD  -Cont and maintain Rt IJ HD PermCath  -Monitor strict I&O's, replace lytes PRN  -Hold nephrotoxic medications
renal input appreciated  save LUE for eventual AVF  AVF  am  continue daily assessment to evaluate need for dialysis/clearance  Hold nephrotoxic meds  Trend urine output  Trend BMP
renal input appreciated  save LUE for eventual AVF  AVF  today  continue daily assessment to evaluate need for dialysis/clearance  Hold nephrotoxic meds  Trend urine output  Trend BMP
renal input appreciated  save LUE for eventual AVF  d/w vascular re: change permacath d/t non-occlusive thrombus seen  continue daily assessment to evaluate need for dialysis/clearance  adjust meds per CrCl  Hold nephrotoxic meds  Trend urine output  Trend BMP
renal input appreciated  save LUE for eventual AVF  per vascular, tentative date of monday 8/26  continue daily assessment to evaluate need for dialysis/clearance  Hold nephrotoxic meds  Trend urine output  Trend BMP
renal input appreciated  save LUE for eventual AVF  per vascular, tentative date of monday 8/26  continue daily assessment to evaluate need for dialysis/clearance  Hold nephrotoxic meds  Trend urine output  Trend BMP
s/p Left AVF POD #2.  Continue daily assessment to evaluate need for dialysis/clearance  Hold nephrotoxic meds  Trend urine output  Trend BMP
s/p Left AVF POD #1.  Continue daily assessment to evaluate need for dialysis/clearance  Hold nephrotoxic meds  Trend urine output  Trend BMP

## 2019-08-29 NOTE — DISCHARGE NOTE PROVIDER - PROVIDER TOKENS
PROVIDER:[TOKEN:[[id number]],FOLLOWUP:[2 weeks]],PROVIDER:[TOKEN:[[id number]],FOLLOWUP:[1 week]]

## 2019-08-29 NOTE — DISCHARGE NOTE NURSING/CASE MANAGEMENT/SOCIAL WORK - NSDCPEEMAIL_GEN_ALL_CORE
[facility] for [facility] email [email or web address]

## 2019-08-29 NOTE — PROGRESS NOTE ADULT - ASSESSMENT
58y/o M, active smoker, with PMHx of DM2, HTN, HLD, prior CVA (Poor historian-Unclear of time, Reports no sensory/motor deficits), sciatica (Intermit Lt lower ext numbness), history of medication non-compliance.   8/17: [place] [facility] [place]/ c/o sudden onset of SOB with concurrent chest discomfort while performing yard work. Admitted with HTN emergency, acute on CKD. + NSTEMI  8/20 transferred from [facility] after subsequent LHC revealed multivessel CAD (dLAD 90%, D1 80%, pCXF 70%, mCXF 70%, OM1 80%, mRCA, 95%, RPDLS 70%) for which pt will need CABG.     During HD at [facility], pt noted to be obtunded, found to be hypotensive, IVF resuscitation given, BP improved, and mental status returned to baseline.  8/21 dialysis for clearance only, no fluid removed. Vascular surgery consulted (R chest wall permacath may need to be removed d/t non-occlusive thrombus seen, preserve LUE for eventual AVF).  8/26 AVF with vascular    Plan for discharge

## 2019-08-29 NOTE — DISCHARGE NOTE NURSING/CASE MANAGEMENT/SOCIAL WORK - NSDCFUADDAPPT_GEN_ALL_CORE_FT
[facility] 3:30 PM MONDAY, WEDNESDAY, FRIDAY   [address] [facility] [address] [address] [facility] 3:30 PM MONDAY, WEDNESDAY, FRIDAY   [address]  [facility] [address]  Arranged first HD with Andreia for a 2:15PM [employer] p/u from home to the 3:30PM HD at [facility] and a will-call to [facility] ([phone]) for p/u. Andreia says the 2015 Form must be filled out by a physician before any future taxi transportation services after Friday can be arranged. Medicaid authorization: #[id number]

## 2019-08-29 NOTE — DISCHARGE NOTE PROVIDER - NSDCCPTREATMENT_GEN_ALL_CORE_FT
PRINCIPAL PROCEDURE  Procedure: AV fistula  Findings and Treatment: creation of LUE proximal radiocephalic AV fistula

## 2019-08-29 NOTE — PROGRESS NOTE ADULT - PROBLEM SELECTOR PLAN 3
unclear if acute (likely is chronic though no baseline to compare to)  no ACEI/ARB with ARF and in pre-op setting  beta-blocker as above  No diuresis at this time.
Hold Metformin-Elevated BUN/Cr  -ACHS
unclear if acute (likely is chronic though no baseline to compare to )  no ACEI/ARB with ARF and in pre-op setting  beta-blocker as above  No diuresis at this time.
unclear if acute (likely is chronic though no baseline to compare to )  no ACEI/ARB with ARF and in pre-op setting  beta-blocker as above  no diuresis at this time
unclear if acute (likely is chronic though no baseline to compare to )  no ACEI/ARB with ARF and in pre-op setting  beta-blocker as above  No diuresis at this time.

## 2019-08-29 NOTE — DISCHARGE NOTE PROVIDER - NSDCHC_MEDRECSTATUS_GEN_ALL_CORE
Admission Reconciliation is Completed  Discharge Reconciliation is Completed Admission Reconciliation is Not Complete  Discharge Reconciliation is Completed

## 2019-08-29 NOTE — DISCHARGE NOTE PROVIDER - HOSPITAL COURSE
60y/o M, active smoker, with PMHx of DM2, HTN, HLD, prior CVA (Poor historian-Unclear of time, Reports no sensory/motor deficits), sciatica (Intermit Lt lower ext numbness), history of medication non-compliance.     [place]17: [place] [facility] [place] sudden onset of SOB with concurrent chest discomfort while performing yard work. Admitted with HTN emergency, acute on CKD. + NSTEMI    8[place]20 [place] [facility] [place] subsequent LHC revealed multivessel CAD (dLAD 90%, D1 80%, pCXF 70%, mCXF 70%, OM1 80%, mRCA, 95%, RPDLS 70%) for which pt will need CABG.         During HD at [facility], pt noted to be obtunded, found to be hypotensive, IVF resuscitation given, BP improved, and mental status returned to baseline.    8/21 dialysis for clearance only, no fluid removed. Vascular surgery consulted (R chest wall permacath may need to be removed d/t non-occlusive thrombus seen, preserve LUE for eventual AVF).    8/26 AVF with vascular

## 2019-08-29 NOTE — PROGRESS NOTE ADULT - PROBLEM SELECTOR PLAN 1
8[place]20: [place] [facility] [place]  likely d/c home today (dialysis seat available in community) and return as outpt for elective surgery  Continue asa, lipitor and lopressor  D/W Dr Rondon at AM rounds

## 2019-08-29 NOTE — PROGRESS NOTE ADULT - PROBLEM SELECTOR PROBLEM 5
Chronic obstructive pulmonary disease, unspecified COPD type
HLD (hyperlipidemia)
Chronic obstructive pulmonary disease, unspecified COPD type

## 2019-08-29 NOTE — PROGRESS NOTE ADULT - PROBLEM SELECTOR PROBLEM 6
HTN (hypertension)
Prophylactic measure
HTN (hypertension)

## 2019-08-29 NOTE — DISCHARGE NOTE PROVIDER - NSDCFUADDINST_GEN_ALL_CORE_FT
Please call the Cardiothoracic Surgery office at [phone] if you are experiencing any shortness of breath, chest pain, fevers or chills, drainage from the incisions, persistent nausea, vomiting or if you have any questions about your medications. If the symptoms are severe, call 911 and go to the nearest hospital. You can also call [phone] for an emergency [facility] ambulance, which will take you to the closest [facility].    If you need any assistance for making any appointments for a new consult or referral in any specialty, please call our [facility] Clinical Coordination Center at [phone].

## 2019-08-29 NOTE — PROGRESS NOTE ADULT - PROBLEM SELECTOR PROBLEM 3
Acute combined systolic and diastolic heart failure
DM2 (diabetes mellitus, type 2)
Acute combined systolic and diastolic heart failure

## 2019-09-01 ENCOUNTER — OUTPATIENT (OUTPATIENT)
Dept: OUTPATIENT SERVICES | Facility: HOSPITAL | Age: 59
LOS: 1 days | End: 2019-09-01
Payer: MEDICAID

## 2019-09-06 NOTE — ASU PATIENT PROFILE, ADULT - PMH
Cerebrovascular accident (CVA)  Poor historian, unaware of date  DM2 (diabetes mellitus, type 2)    HLD (hyperlipidemia)    HTN (hypertension)

## 2019-09-06 NOTE — ASU PATIENT PROFILE, ADULT - MUTUALITY COMMENT, PROFILE
Pt was unaware that his surgery is scheduled for tomorrow 09/13/19 Dr Welch's surgical coordinator notified.

## 2019-09-12 ENCOUNTER — TRANSCRIPTION ENCOUNTER (OUTPATIENT)
Age: 59
End: 2019-09-12

## 2019-09-13 ENCOUNTER — INPATIENT (INPATIENT)
Facility: HOSPITAL | Age: 59
LOS: 4 days | Discharge: HOME | DRG: 235 | End: 2019-09-18
Attending: THORACIC SURGERY (CARDIOTHORACIC VASCULAR SURGERY) | Admitting: THORACIC SURGERY (CARDIOTHORACIC VASCULAR SURGERY)
Payer: MEDICARE

## 2019-09-13 ENCOUNTER — APPOINTMENT (OUTPATIENT)
Dept: CARDIOTHORACIC SURGERY | Facility: HOSPITAL | Age: 59
End: 2019-09-13

## 2019-09-13 VITALS
DIASTOLIC BLOOD PRESSURE: 71 MMHG | HEIGHT: 65 IN | WEIGHT: 197.09 LBS | SYSTOLIC BLOOD PRESSURE: 186 MMHG | RESPIRATION RATE: 16 BRPM | HEART RATE: 66 BPM | OXYGEN SATURATION: 100 % | TEMPERATURE: 98 F

## 2019-09-13 DIAGNOSIS — I25.10 ATHEROSCLEROTIC HEART DISEASE OF NATIVE CORONARY ARTERY WITHOUT ANGINA PECTORIS: ICD-10-CM

## 2019-09-13 LAB
ALBUMIN SERPL ELPH-MCNC: 4 G/DL — SIGNIFICANT CHANGE UP (ref 3.3–5.2)
ALP SERPL-CCNC: 77 U/L — SIGNIFICANT CHANGE UP (ref 40–120)
ALT FLD-CCNC: 9 U/L — SIGNIFICANT CHANGE UP
ANION GAP SERPL CALC-SCNC: 13 MMOL/L — SIGNIFICANT CHANGE UP (ref 5–17)
ANION GAP SERPL CALC-SCNC: 20 MMOL/L — HIGH (ref 5–17)
APTT BLD: 29.1 SEC — SIGNIFICANT CHANGE UP (ref 27.5–36.3)
AST SERPL-CCNC: 15 U/L — SIGNIFICANT CHANGE UP
BILIRUB DIRECT SERPL-MCNC: 0.2 MG/DL — SIGNIFICANT CHANGE UP (ref 0–0.3)
BILIRUB INDIRECT FLD-MCNC: 0.4 MG/DL — SIGNIFICANT CHANGE UP (ref 0.2–1)
BILIRUB SERPL-MCNC: 0.6 MG/DL — SIGNIFICANT CHANGE UP (ref 0.4–2)
BLD GP AB SCN SERPL QL: SIGNIFICANT CHANGE UP
BUN SERPL-MCNC: 27 MG/DL — HIGH (ref 8–20)
BUN SERPL-MCNC: 33 MG/DL — HIGH (ref 8–20)
CALCIUM SERPL-MCNC: 9.5 MG/DL — SIGNIFICANT CHANGE UP (ref 8.6–10.2)
CALCIUM SERPL-MCNC: 9.8 MG/DL — SIGNIFICANT CHANGE UP (ref 8.6–10.2)
CHLORIDE SERPL-SCNC: 96 MMOL/L — LOW (ref 98–107)
CHLORIDE SERPL-SCNC: 97 MMOL/L — LOW (ref 98–107)
CK MB CFR SERPL CALC: 7.2 NG/ML — HIGH (ref 0–6.7)
CK SERPL-CCNC: 161 U/L — SIGNIFICANT CHANGE UP (ref 30–200)
CO2 SERPL-SCNC: 21 MMOL/L — LOW (ref 22–29)
CO2 SERPL-SCNC: 26 MMOL/L — SIGNIFICANT CHANGE UP (ref 22–29)
CREAT SERPL-MCNC: 3.78 MG/DL — HIGH (ref 0.5–1.3)
CREAT SERPL-MCNC: 5.14 MG/DL — HIGH (ref 0.5–1.3)
GAS PNL BLDA: SIGNIFICANT CHANGE UP
GAS PNL BLDA: SIGNIFICANT CHANGE UP
GLUCOSE BLDC GLUCOMTR-MCNC: 131 MG/DL — HIGH (ref 70–99)
GLUCOSE BLDC GLUCOMTR-MCNC: 151 MG/DL — HIGH (ref 70–99)
GLUCOSE BLDC GLUCOMTR-MCNC: 166 MG/DL — HIGH (ref 70–99)
GLUCOSE BLDC GLUCOMTR-MCNC: 168 MG/DL — HIGH (ref 70–99)
GLUCOSE BLDC GLUCOMTR-MCNC: 168 MG/DL — HIGH (ref 70–99)
GLUCOSE BLDC GLUCOMTR-MCNC: 169 MG/DL — HIGH (ref 70–99)
GLUCOSE BLDC GLUCOMTR-MCNC: 172 MG/DL — HIGH (ref 70–99)
GLUCOSE BLDC GLUCOMTR-MCNC: 182 MG/DL — HIGH (ref 70–99)
GLUCOSE BLDC GLUCOMTR-MCNC: 190 MG/DL — HIGH (ref 70–99)
GLUCOSE SERPL-MCNC: 164 MG/DL — HIGH (ref 70–115)
GLUCOSE SERPL-MCNC: 179 MG/DL — HIGH (ref 70–115)
HCT VFR BLD CALC: 32.5 % — LOW (ref 39–50)
HGB BLD-MCNC: 10.6 G/DL — LOW (ref 13–17)
MAGNESIUM SERPL-MCNC: 3.1 MG/DL — HIGH (ref 1.6–2.6)
MCHC RBC-ENTMCNC: 27.4 PG — SIGNIFICANT CHANGE UP (ref 27–34)
MCHC RBC-ENTMCNC: 32.6 GM/DL — SIGNIFICANT CHANGE UP (ref 32–36)
MCV RBC AUTO: 84 FL — SIGNIFICANT CHANGE UP (ref 80–100)
PHOSPHATE SERPL-MCNC: 1.4 MG/DL — LOW (ref 2.4–4.7)
PLATELET # BLD AUTO: 120 K/UL — LOW (ref 150–400)
POTASSIUM SERPL-MCNC: 4.9 MMOL/L — SIGNIFICANT CHANGE UP (ref 3.5–5.3)
POTASSIUM SERPL-MCNC: 5.1 MMOL/L — SIGNIFICANT CHANGE UP (ref 3.5–5.3)
POTASSIUM SERPL-SCNC: 4.9 MMOL/L — SIGNIFICANT CHANGE UP (ref 3.5–5.3)
POTASSIUM SERPL-SCNC: 5.1 MMOL/L — SIGNIFICANT CHANGE UP (ref 3.5–5.3)
PROT SERPL-MCNC: 5.6 G/DL — LOW (ref 6.6–8.7)
RBC # BLD: 3.87 M/UL — LOW (ref 4.2–5.8)
RBC # FLD: 15.1 % — HIGH (ref 10.3–14.5)
SODIUM SERPL-SCNC: 135 MMOL/L — SIGNIFICANT CHANGE UP (ref 135–145)
SODIUM SERPL-SCNC: 138 MMOL/L — SIGNIFICANT CHANGE UP (ref 135–145)
TROPONIN T SERPL-MCNC: 0.08 NG/ML — HIGH (ref 0–0.06)
WBC # BLD: 11.99 K/UL — HIGH (ref 3.8–10.5)
WBC # FLD AUTO: 11.99 K/UL — HIGH (ref 3.8–10.5)

## 2019-09-13 PROCEDURE — 33518 CABG ARTERY-VEIN TWO: CPT | Mod: AS

## 2019-09-13 PROCEDURE — 33518 CABG ARTERY-VEIN TWO: CPT

## 2019-09-13 PROCEDURE — 93010 ELECTROCARDIOGRAM REPORT: CPT

## 2019-09-13 PROCEDURE — 33533 CABG ARTERIAL SINGLE: CPT | Mod: AS

## 2019-09-13 PROCEDURE — 33533 CABG ARTERIAL SINGLE: CPT

## 2019-09-13 RX ORDER — ALBUMIN HUMAN 25 %
100 VIAL (ML) INTRAVENOUS
Refills: 0 | Status: DISCONTINUED | OUTPATIENT
Start: 2019-09-13 | End: 2019-09-13

## 2019-09-13 RX ORDER — CEFUROXIME AXETIL 250 MG
1500 TABLET ORAL ONCE
Refills: 0 | Status: DISCONTINUED | OUTPATIENT
Start: 2019-09-13 | End: 2019-09-13

## 2019-09-13 RX ORDER — SODIUM CHLORIDE 9 MG/ML
1000 INJECTION INTRAMUSCULAR; INTRAVENOUS; SUBCUTANEOUS
Refills: 0 | Status: DISCONTINUED | OUTPATIENT
Start: 2019-09-13 | End: 2019-09-15

## 2019-09-13 RX ORDER — POTASSIUM CHLORIDE 20 MEQ
10 PACKET (EA) ORAL
Refills: 0 | Status: DISCONTINUED | OUTPATIENT
Start: 2019-09-13 | End: 2019-09-15

## 2019-09-13 RX ORDER — INSULIN HUMAN 100 [IU]/ML
2 INJECTION, SOLUTION SUBCUTANEOUS
Qty: 250 | Refills: 0 | Status: DISCONTINUED | OUTPATIENT
Start: 2019-09-13 | End: 2019-09-15

## 2019-09-13 RX ORDER — CLOPIDOGREL BISULFATE 75 MG/1
75 TABLET, FILM COATED ORAL DAILY
Refills: 0 | Status: DISCONTINUED | OUTPATIENT
Start: 2019-09-14 | End: 2019-09-18

## 2019-09-13 RX ORDER — ALBUMIN HUMAN 25 %
250 VIAL (ML) INTRAVENOUS ONCE
Refills: 0 | Status: DISCONTINUED | OUTPATIENT
Start: 2019-09-13 | End: 2019-09-15

## 2019-09-13 RX ORDER — POLYETHYLENE GLYCOL 3350 17 G/17G
17 POWDER, FOR SOLUTION ORAL DAILY
Refills: 0 | Status: DISCONTINUED | OUTPATIENT
Start: 2019-09-13 | End: 2019-09-18

## 2019-09-13 RX ORDER — ACETAMINOPHEN 500 MG
1000 TABLET ORAL ONCE
Refills: 0 | Status: COMPLETED | OUTPATIENT
Start: 2019-09-13 | End: 2019-09-13

## 2019-09-13 RX ORDER — CHLORHEXIDINE GLUCONATE 213 G/1000ML
1 SOLUTION TOPICAL DAILY
Refills: 0 | Status: DISCONTINUED | OUTPATIENT
Start: 2019-09-13 | End: 2019-09-18

## 2019-09-13 RX ORDER — BUDESONIDE AND FORMOTEROL FUMARATE DIHYDRATE 160; 4.5 UG/1; UG/1
2 AEROSOL RESPIRATORY (INHALATION)
Refills: 0 | Status: DISCONTINUED | OUTPATIENT
Start: 2019-09-13 | End: 2019-09-18

## 2019-09-13 RX ORDER — CLOPIDOGREL BISULFATE 75 MG/1
75 TABLET, FILM COATED ORAL ONCE
Refills: 0 | Status: COMPLETED | OUTPATIENT
Start: 2019-09-13 | End: 2019-09-13

## 2019-09-13 RX ORDER — ALBUMIN HUMAN 25 %
250 VIAL (ML) INTRAVENOUS ONCE
Refills: 0 | Status: COMPLETED | OUTPATIENT
Start: 2019-09-13 | End: 2019-09-13

## 2019-09-13 RX ORDER — PANTOPRAZOLE SODIUM 20 MG/1
40 TABLET, DELAYED RELEASE ORAL ONCE
Refills: 0 | Status: COMPLETED | OUTPATIENT
Start: 2019-09-13 | End: 2019-09-13

## 2019-09-13 RX ORDER — CHLORHEXIDINE GLUCONATE 213 G/1000ML
5 SOLUTION TOPICAL EVERY 4 HOURS
Refills: 0 | Status: DISCONTINUED | OUTPATIENT
Start: 2019-09-13 | End: 2019-09-14

## 2019-09-13 RX ORDER — DOCUSATE SODIUM 100 MG
100 CAPSULE ORAL THREE TIMES A DAY
Refills: 0 | Status: DISCONTINUED | OUTPATIENT
Start: 2019-09-14 | End: 2019-09-18

## 2019-09-13 RX ORDER — DESMOPRESSIN ACETATE 0.1 MG/1
25 TABLET ORAL ONCE
Refills: 0 | Status: DISCONTINUED | OUTPATIENT
Start: 2019-09-13 | End: 2019-09-13

## 2019-09-13 RX ORDER — PANTOPRAZOLE SODIUM 20 MG/1
40 TABLET, DELAYED RELEASE ORAL DAILY
Refills: 0 | Status: DISCONTINUED | OUTPATIENT
Start: 2019-09-14 | End: 2019-09-18

## 2019-09-13 RX ORDER — SODIUM CHLORIDE 9 MG/ML
3 INJECTION INTRAMUSCULAR; INTRAVENOUS; SUBCUTANEOUS EVERY 8 HOURS
Refills: 0 | Status: DISCONTINUED | OUTPATIENT
Start: 2019-09-13 | End: 2019-09-13

## 2019-09-13 RX ORDER — ASPIRIN/CALCIUM CARB/MAGNESIUM 324 MG
325 TABLET ORAL DAILY
Refills: 0 | Status: DISCONTINUED | OUTPATIENT
Start: 2019-09-14 | End: 2019-09-18

## 2019-09-13 RX ORDER — VANCOMYCIN HCL 1 G
1000 VIAL (EA) INTRAVENOUS EVERY 24 HOURS
Refills: 0 | Status: DISCONTINUED | OUTPATIENT
Start: 2019-09-14 | End: 2019-09-14

## 2019-09-13 RX ORDER — NOREPINEPHRINE BITARTRATE/D5W 8 MG/250ML
0.05 PLASTIC BAG, INJECTION (ML) INTRAVENOUS
Qty: 8 | Refills: 0 | Status: DISCONTINUED | OUTPATIENT
Start: 2019-09-13 | End: 2019-09-14

## 2019-09-13 RX ORDER — ATORVASTATIN CALCIUM 80 MG/1
40 TABLET, FILM COATED ORAL AT BEDTIME
Refills: 0 | Status: DISCONTINUED | OUTPATIENT
Start: 2019-09-13 | End: 2019-09-18

## 2019-09-13 RX ORDER — ASPIRIN/CALCIUM CARB/MAGNESIUM 324 MG
325 TABLET ORAL ONCE
Refills: 0 | Status: COMPLETED | OUTPATIENT
Start: 2019-09-13 | End: 2019-09-13

## 2019-09-13 RX ORDER — CEFUROXIME AXETIL 250 MG
1500 TABLET ORAL EVERY 24 HOURS
Refills: 0 | Status: COMPLETED | OUTPATIENT
Start: 2019-09-14 | End: 2019-09-15

## 2019-09-13 RX ADMIN — BUDESONIDE AND FORMOTEROL FUMARATE DIHYDRATE 2 PUFF(S): 160; 4.5 AEROSOL RESPIRATORY (INHALATION) at 21:34

## 2019-09-13 RX ADMIN — CHLORHEXIDINE GLUCONATE 1 APPLICATION(S): 213 SOLUTION TOPICAL at 15:29

## 2019-09-13 RX ADMIN — CLOPIDOGREL BISULFATE 75 MILLIGRAM(S): 75 TABLET, FILM COATED ORAL at 21:38

## 2019-09-13 RX ADMIN — Medication 125 MILLILITER(S): at 15:30

## 2019-09-13 RX ADMIN — Medication 125 MILLILITER(S): at 19:30

## 2019-09-13 RX ADMIN — Medication 325 MILLIGRAM(S): at 21:38

## 2019-09-13 RX ADMIN — ATORVASTATIN CALCIUM 40 MILLIGRAM(S): 80 TABLET, FILM COATED ORAL at 21:38

## 2019-09-13 RX ADMIN — PANTOPRAZOLE SODIUM 40 MILLIGRAM(S): 20 TABLET, DELAYED RELEASE ORAL at 15:29

## 2019-09-13 RX ADMIN — CHLORHEXIDINE GLUCONATE 5 MILLILITER(S): 213 SOLUTION TOPICAL at 18:14

## 2019-09-13 RX ADMIN — CHLORHEXIDINE GLUCONATE 5 MILLILITER(S): 213 SOLUTION TOPICAL at 15:28

## 2019-09-13 RX ADMIN — Medication 1000 MILLIGRAM(S): at 19:05

## 2019-09-13 RX ADMIN — Medication 400 MILLIGRAM(S): at 18:55

## 2019-09-13 RX ADMIN — Medication 125 MILLILITER(S): at 18:57

## 2019-09-13 NOTE — ASU PREOP CHECKLIST - PATIENT'S PERSONAL PROPERTY GIVEN TO
Care Coordination to call for TCU follow up:    Reason for follow up: Diya Cruz appeared on our list for being seen in an Emergency Room or a recent Hospital discharge.    Admitting date: 1/5/17  Discharge date: 1/21/17  Location: TCU  Reason for visit:   Chief Complaint   Patient presents with     Hospital F/U     TCU discharge - physical deconditioning      Cathleen Davies, RN, BSN       family member

## 2019-09-13 NOTE — AIRWAY REMOVAL NOTE  ADULT & PEDS - ARTIFICAL AIRWAY REMOVAL COMMENTS
Patient extubated successfully on 40%  aerosol mask reason for artificial airway has been resolved. Patient is currently stable no signs of respiratory distress noted. Will continue to monitor.

## 2019-09-14 DIAGNOSIS — I63.9 CEREBRAL INFARCTION, UNSPECIFIED: ICD-10-CM

## 2019-09-14 DIAGNOSIS — I25.10 ATHEROSCLEROTIC HEART DISEASE OF NATIVE CORONARY ARTERY WITHOUT ANGINA PECTORIS: ICD-10-CM

## 2019-09-14 DIAGNOSIS — N18.6 END STAGE RENAL DISEASE: ICD-10-CM

## 2019-09-14 DIAGNOSIS — E11.9 TYPE 2 DIABETES MELLITUS WITHOUT COMPLICATIONS: ICD-10-CM

## 2019-09-14 DIAGNOSIS — I10 ESSENTIAL (PRIMARY) HYPERTENSION: ICD-10-CM

## 2019-09-14 DIAGNOSIS — E78.5 HYPERLIPIDEMIA, UNSPECIFIED: ICD-10-CM

## 2019-09-14 LAB
ALBUMIN SERPL ELPH-MCNC: 4.3 G/DL — SIGNIFICANT CHANGE UP (ref 3.3–5.2)
ALP SERPL-CCNC: 71 U/L — SIGNIFICANT CHANGE UP (ref 40–120)
ALT FLD-CCNC: 9 U/L — SIGNIFICANT CHANGE UP
ANION GAP SERPL CALC-SCNC: 15 MMOL/L — SIGNIFICANT CHANGE UP (ref 5–17)
ANION GAP SERPL CALC-SCNC: 17 MMOL/L — SIGNIFICANT CHANGE UP (ref 5–17)
APTT BLD: 28.8 SEC — SIGNIFICANT CHANGE UP (ref 27.5–36.3)
AST SERPL-CCNC: 19 U/L — SIGNIFICANT CHANGE UP
BILIRUB DIRECT SERPL-MCNC: 0.2 MG/DL — SIGNIFICANT CHANGE UP (ref 0–0.3)
BILIRUB INDIRECT FLD-MCNC: 0.4 MG/DL — SIGNIFICANT CHANGE UP (ref 0.2–1)
BILIRUB SERPL-MCNC: 0.6 MG/DL — SIGNIFICANT CHANGE UP (ref 0.4–2)
BUN SERPL-MCNC: 21 MG/DL — HIGH (ref 8–20)
BUN SERPL-MCNC: 33 MG/DL — HIGH (ref 8–20)
CALCIUM SERPL-MCNC: 8.3 MG/DL — LOW (ref 8.6–10.2)
CALCIUM SERPL-MCNC: 9.2 MG/DL — SIGNIFICANT CHANGE UP (ref 8.6–10.2)
CHLORIDE SERPL-SCNC: 98 MMOL/L — SIGNIFICANT CHANGE UP (ref 98–107)
CHLORIDE SERPL-SCNC: 98 MMOL/L — SIGNIFICANT CHANGE UP (ref 98–107)
CK MB CFR SERPL CALC: 7.3 NG/ML — HIGH (ref 0–6.7)
CK SERPL-CCNC: 276 U/L — HIGH (ref 30–200)
CO2 SERPL-SCNC: 23 MMOL/L — SIGNIFICANT CHANGE UP (ref 22–29)
CO2 SERPL-SCNC: 24 MMOL/L — SIGNIFICANT CHANGE UP (ref 22–29)
CREAT SERPL-MCNC: 3.07 MG/DL — HIGH (ref 0.5–1.3)
CREAT SERPL-MCNC: 4.56 MG/DL — HIGH (ref 0.5–1.3)
GLUCOSE BLDC GLUCOMTR-MCNC: 109 MG/DL — HIGH (ref 70–99)
GLUCOSE BLDC GLUCOMTR-MCNC: 110 MG/DL — HIGH (ref 70–99)
GLUCOSE BLDC GLUCOMTR-MCNC: 117 MG/DL — HIGH (ref 70–99)
GLUCOSE BLDC GLUCOMTR-MCNC: 122 MG/DL — HIGH (ref 70–99)
GLUCOSE BLDC GLUCOMTR-MCNC: 127 MG/DL — HIGH (ref 70–99)
GLUCOSE BLDC GLUCOMTR-MCNC: 131 MG/DL — HIGH (ref 70–99)
GLUCOSE BLDC GLUCOMTR-MCNC: 137 MG/DL — HIGH (ref 70–99)
GLUCOSE BLDC GLUCOMTR-MCNC: 138 MG/DL — HIGH (ref 70–99)
GLUCOSE BLDC GLUCOMTR-MCNC: 140 MG/DL — HIGH (ref 70–99)
GLUCOSE BLDC GLUCOMTR-MCNC: 145 MG/DL — HIGH (ref 70–99)
GLUCOSE BLDC GLUCOMTR-MCNC: 153 MG/DL — HIGH (ref 70–99)
GLUCOSE BLDC GLUCOMTR-MCNC: 158 MG/DL — HIGH (ref 70–99)
GLUCOSE BLDC GLUCOMTR-MCNC: 165 MG/DL — HIGH (ref 70–99)
GLUCOSE BLDC GLUCOMTR-MCNC: 172 MG/DL — HIGH (ref 70–99)
GLUCOSE BLDC GLUCOMTR-MCNC: 206 MG/DL — HIGH (ref 70–99)
GLUCOSE SERPL-MCNC: 123 MG/DL — HIGH (ref 70–115)
GLUCOSE SERPL-MCNC: 146 MG/DL — HIGH (ref 70–115)
HCT VFR BLD CALC: 31.1 % — LOW (ref 39–50)
HGB BLD-MCNC: 10 G/DL — LOW (ref 13–17)
INR BLD: 1.06 RATIO — SIGNIFICANT CHANGE UP (ref 0.88–1.16)
MAGNESIUM SERPL-MCNC: 2.9 MG/DL — HIGH (ref 1.6–2.6)
MCHC RBC-ENTMCNC: 27.3 PG — SIGNIFICANT CHANGE UP (ref 27–34)
MCHC RBC-ENTMCNC: 32.2 GM/DL — SIGNIFICANT CHANGE UP (ref 32–36)
MCV RBC AUTO: 85 FL — SIGNIFICANT CHANGE UP (ref 80–100)
PLATELET # BLD AUTO: 139 K/UL — LOW (ref 150–400)
POTASSIUM SERPL-MCNC: 4.5 MMOL/L — SIGNIFICANT CHANGE UP (ref 3.5–5.3)
POTASSIUM SERPL-MCNC: 5.6 MMOL/L — HIGH (ref 3.5–5.3)
POTASSIUM SERPL-SCNC: 4.5 MMOL/L — SIGNIFICANT CHANGE UP (ref 3.5–5.3)
POTASSIUM SERPL-SCNC: 5.6 MMOL/L — HIGH (ref 3.5–5.3)
PROT SERPL-MCNC: 6.1 G/DL — LOW (ref 6.6–8.7)
PROTHROM AB SERPL-ACNC: 12.2 SEC — SIGNIFICANT CHANGE UP (ref 10–12.9)
RBC # BLD: 3.66 M/UL — LOW (ref 4.2–5.8)
RBC # FLD: 15.4 % — HIGH (ref 10.3–14.5)
SODIUM SERPL-SCNC: 137 MMOL/L — SIGNIFICANT CHANGE UP (ref 135–145)
SODIUM SERPL-SCNC: 138 MMOL/L — SIGNIFICANT CHANGE UP (ref 135–145)
TROPONIN T SERPL-MCNC: 0.28 NG/ML — HIGH (ref 0–0.06)
VANCOMYCIN TROUGH SERPL-MCNC: 8.6 UG/ML — LOW (ref 10–20)
WBC # BLD: 17.34 K/UL — HIGH (ref 3.8–10.5)
WBC # FLD AUTO: 17.34 K/UL — HIGH (ref 3.8–10.5)

## 2019-09-14 PROCEDURE — 71045 X-RAY EXAM CHEST 1 VIEW: CPT | Mod: 26

## 2019-09-14 PROCEDURE — 90937 HEMODIALYSIS REPEATED EVAL: CPT

## 2019-09-14 PROCEDURE — 93010 ELECTROCARDIOGRAM REPORT: CPT

## 2019-09-14 PROCEDURE — 99024 POSTOP FOLLOW-UP VISIT: CPT

## 2019-09-14 PROCEDURE — 99223 1ST HOSP IP/OBS HIGH 75: CPT | Mod: GC,25

## 2019-09-14 RX ORDER — IPRATROPIUM/ALBUTEROL SULFATE 18-103MCG
3 AEROSOL WITH ADAPTER (GRAM) INHALATION EVERY 6 HOURS
Refills: 0 | Status: DISCONTINUED | OUTPATIENT
Start: 2019-09-14 | End: 2019-09-18

## 2019-09-14 RX ORDER — INSULIN LISPRO 100/ML
VIAL (ML) SUBCUTANEOUS
Refills: 0 | Status: DISCONTINUED | OUTPATIENT
Start: 2019-09-14 | End: 2019-09-16

## 2019-09-14 RX ORDER — OXYCODONE AND ACETAMINOPHEN 5; 325 MG/1; MG/1
1 TABLET ORAL EVERY 4 HOURS
Refills: 0 | Status: DISCONTINUED | OUTPATIENT
Start: 2019-09-14 | End: 2019-09-18

## 2019-09-14 RX ORDER — DEXTROSE 50 % IN WATER 50 %
25 SYRINGE (ML) INTRAVENOUS ONCE
Refills: 0 | Status: DISCONTINUED | OUTPATIENT
Start: 2019-09-14 | End: 2019-09-15

## 2019-09-14 RX ORDER — HEPARIN SODIUM 5000 [USP'U]/ML
5000 INJECTION INTRAVENOUS; SUBCUTANEOUS EVERY 8 HOURS
Refills: 0 | Status: DISCONTINUED | OUTPATIENT
Start: 2019-09-14 | End: 2019-09-18

## 2019-09-14 RX ORDER — INSULIN LISPRO 100/ML
6 VIAL (ML) SUBCUTANEOUS
Refills: 0 | Status: DISCONTINUED | OUTPATIENT
Start: 2019-09-14 | End: 2019-09-17

## 2019-09-14 RX ORDER — DEXTROSE 50 % IN WATER 50 %
15 SYRINGE (ML) INTRAVENOUS ONCE
Refills: 0 | Status: DISCONTINUED | OUTPATIENT
Start: 2019-09-14 | End: 2019-09-15

## 2019-09-14 RX ORDER — VANCOMYCIN HCL 1 G
1000 VIAL (EA) INTRAVENOUS ONCE
Refills: 0 | Status: COMPLETED | OUTPATIENT
Start: 2019-09-14 | End: 2019-09-14

## 2019-09-14 RX ORDER — INSULIN GLARGINE 100 [IU]/ML
20 INJECTION, SOLUTION SUBCUTANEOUS AT BEDTIME
Refills: 0 | Status: DISCONTINUED | OUTPATIENT
Start: 2019-09-14 | End: 2019-09-17

## 2019-09-14 RX ORDER — GLUCAGON INJECTION, SOLUTION 0.5 MG/.1ML
1 INJECTION, SOLUTION SUBCUTANEOUS ONCE
Refills: 0 | Status: DISCONTINUED | OUTPATIENT
Start: 2019-09-14 | End: 2019-09-15

## 2019-09-14 RX ORDER — DEXTROSE 50 % IN WATER 50 %
12.5 SYRINGE (ML) INTRAVENOUS ONCE
Refills: 0 | Status: DISCONTINUED | OUTPATIENT
Start: 2019-09-14 | End: 2019-09-15

## 2019-09-14 RX ORDER — SODIUM CHLORIDE 9 MG/ML
1000 INJECTION, SOLUTION INTRAVENOUS
Refills: 0 | Status: DISCONTINUED | OUTPATIENT
Start: 2019-09-14 | End: 2019-09-15

## 2019-09-14 RX ADMIN — Medication 100 MILLIGRAM(S): at 05:31

## 2019-09-14 RX ADMIN — PANTOPRAZOLE SODIUM 40 MILLIGRAM(S): 20 TABLET, DELAYED RELEASE ORAL at 12:00

## 2019-09-14 RX ADMIN — OXYCODONE AND ACETAMINOPHEN 1 TABLET(S): 5; 325 TABLET ORAL at 23:13

## 2019-09-14 RX ADMIN — OXYCODONE AND ACETAMINOPHEN 1 TABLET(S): 5; 325 TABLET ORAL at 18:51

## 2019-09-14 RX ADMIN — CLOPIDOGREL BISULFATE 75 MILLIGRAM(S): 75 TABLET, FILM COATED ORAL at 12:00

## 2019-09-14 RX ADMIN — CHLORHEXIDINE GLUCONATE 1 APPLICATION(S): 213 SOLUTION TOPICAL at 12:00

## 2019-09-14 RX ADMIN — BUDESONIDE AND FORMOTEROL FUMARATE DIHYDRATE 2 PUFF(S): 160; 4.5 AEROSOL RESPIRATORY (INHALATION) at 08:36

## 2019-09-14 RX ADMIN — HEPARIN SODIUM 5000 UNIT(S): 5000 INJECTION INTRAVENOUS; SUBCUTANEOUS at 14:50

## 2019-09-14 RX ADMIN — OXYCODONE AND ACETAMINOPHEN 1 TABLET(S): 5; 325 TABLET ORAL at 14:49

## 2019-09-14 RX ADMIN — Medication 100 MILLIGRAM(S): at 13:48

## 2019-09-14 RX ADMIN — HEPARIN SODIUM 5000 UNIT(S): 5000 INJECTION INTRAVENOUS; SUBCUTANEOUS at 21:46

## 2019-09-14 RX ADMIN — INSULIN GLARGINE 20 UNIT(S): 100 INJECTION, SOLUTION SUBCUTANEOUS at 21:46

## 2019-09-14 RX ADMIN — Medication 100 MILLIGRAM(S): at 21:47

## 2019-09-14 RX ADMIN — ATORVASTATIN CALCIUM 40 MILLIGRAM(S): 80 TABLET, FILM COATED ORAL at 21:47

## 2019-09-14 RX ADMIN — POLYETHYLENE GLYCOL 3350 17 GRAM(S): 17 POWDER, FOR SOLUTION ORAL at 12:00

## 2019-09-14 RX ADMIN — OXYCODONE AND ACETAMINOPHEN 1 TABLET(S): 5; 325 TABLET ORAL at 23:37

## 2019-09-14 RX ADMIN — OXYCODONE AND ACETAMINOPHEN 1 TABLET(S): 5; 325 TABLET ORAL at 15:30

## 2019-09-14 RX ADMIN — BUDESONIDE AND FORMOTEROL FUMARATE DIHYDRATE 2 PUFF(S): 160; 4.5 AEROSOL RESPIRATORY (INHALATION) at 20:38

## 2019-09-14 RX ADMIN — Medication 325 MILLIGRAM(S): at 12:00

## 2019-09-14 RX ADMIN — Medication 250 MILLIGRAM(S): at 16:34

## 2019-09-14 NOTE — CONSULT NOTE ADULT - SUBJECTIVE AND OBJECTIVE BOX
[facility] DIVISION OF KIDNEY DISEASES AND HYPERTENSION -- INITIAL CONSULT NOTE  --------------------------------------------------------------------------------  HPI:   60y/o  AA M, active smoker, with PMHx of DM2, HTN, HLD, prior CVA (Poor historian-Unclear of time, Reports no sensory/motor deficits), sciatica (Intermit Lt lower ext numbness), history of medication non-compliance presents as a transfer from [facility] for CTS evaluation for possible CABG, under care of Dr. Mims. Pt presented to [facility] ED on 8/17/19 with sudden onset of SOB with concurrent chest discomfort while performing yard work. Reports this is the first occurrence of these symptoms and without identifiable provoking/relieving factors. In [facility] ED pt found to be HTN-'s, 's (Treated with Labetalol, hydralazine, and procardia), with  signs of end organ damage: Creatinine 4.5 (Baseline 2018 Cr: 2.2), elevated transaminase (ALT:70, AST:95), Trop 0.123, BNP 22,000. CXR: + increased pulm vascular congestion, ECHO: EF 35% with LVH, Mild/Mod MR, mild TR, mild AI, EKG: +ST/T-wave abnormality suggesting inferior & Anterolateral ischemia, Lower ext doppler: NEG DVT, Renal US consistent with renal disease (Pt has no known Hx of renal disease). 8/19 Continued Cr elevation (4.9-5.8) requiring HD (8/19 Rt TDC Permacath). 8/20: Cardiac cath (Held for Cr level optimization): +MVD showing dLAD 90%, D1 80%, pCXF 70%, mCXF 70%, OM1 80%, mRCA, 95%, RPDLS 70%.   Pt s/p CABG POD 1;  Due for HD today;  On machine; consented; tolerating;      PAST HISTORY  --------------------------------------------------------------------------------  PAST MEDICAL & SURGICAL HISTORY:  HLD (hyperlipidemia)  Cerebrovascular accident (CVA): Poor historian, unaware of date  DM2 (diabetes mellitus, type 2)  HTN (hypertension)  No significant past surgical history    FAMILY HISTORY:  FH: HTN (hypertension)  FH: CAD (coronary artery disease)    PAST SOCIAL HISTORY:    ALLERGIES & MEDICATIONS  --------------------------------------------------------------------------------  Allergies    lactose (Flatulence (Severe); Diarrhea (Severe); Stomach Upset (Severe); Nausea (Severe))  No Known Drug Allergies    Intolerances      Standing Inpatient Medications  albumin human  5% IVPB 250 milliLiter(s) IV Intermittent once  aspirin enteric coated 325 milliGRAM(s) Oral daily  atorvastatin 40 milliGRAM(s) Oral at bedtime  buDESOnide 160 MICROgram(s)/formoterol 4.5 MICROgram(s) Inhaler 2 Puff(s) Inhalation two times a day  cefuroxime  IVPB 1500 milliGRAM(s) IV Intermittent every 24 hours  chlorhexidine 0.12% Liquid 5 milliLiter(s) Oral Mucosa every 4 hours  chlorhexidine 2% Cloths 1 Application(s) Topical daily  clopidogrel Tablet 75 milliGRAM(s) Oral daily  docusate sodium 100 milliGRAM(s) Oral three times a day  heparin  Injectable 5000 Unit(s) SubCutaneous every 8 hours  insulin regular Infusion 2 Unit(s)/Hr IV Continuous <Continuous>  norepinephrine Infusion 0.05 MICROgram(s)/kG/Min IV Continuous <Continuous>  pantoprazole    Tablet 40 milliGRAM(s) Oral daily  polyethylene glycol 3350 17 Gram(s) Oral daily  potassium chloride  10 mEq/50 mL IVPB 10 milliEquivalent(s) IV Intermittent every 1 hour  potassium chloride  10 mEq/50 mL IVPB 10 milliEquivalent(s) IV Intermittent every 1 hour  potassium chloride  10 mEq/50 mL IVPB 10 milliEquivalent(s) IV Intermittent every 1 hour  sodium chloride 0.9%. 1000 milliLiter(s) IV Continuous <Continuous>  sodium chloride 0.9%. 1000 milliLiter(s) IV Continuous <Continuous>  vancomycin  IVPB 1000 milliGRAM(s) IV Intermittent once    PRN Inpatient Medications  oxyCODONE    5 mG/acetaminophen 325 mG 1 Tablet(s) Oral every 4 hours PRN      REVIEW OF SYSTEMS  --------------------------------------------------------------------------------  Gen: No weight changes, fatigue, fevers/chills, weakness  Skin: No rashes  Head/Eyes/Ears/Mouth: No headache; Normal hearing; Normal vision w/o blurriness; No sinus pain/discomfort, sore throat  Respiratory: No dyspnea, cough, wheezing, hemoptysis  CV: No chest pain, PND, orthopnea  GI: No abdominal pain, diarrhea, constipation, nausea, vomiting, melena, hematochezia  : No increased frequency, dysuria, hematuria, nocturia  MSK: No joint pain/swelling; no back pain; no edema  Neuro: No dizziness/lightheadedness, weakness, seizures, numbness, tingling  Heme: No easy bruising or bleeding  Endo: No heat/cold intolerance  Psych: No significant nervousness, anxiety, stress, depression    All other systems were reviewed and are negative, except as noted.    VITALS/PHYSICAL EXAM  --------------------------------------------------------------------------------  T(C): 36.5 (09-14-19 @ 09:40), Max: 36.8 (09-13-19 @ 19:00)  HR: 70 (09-14-19 @ 11:30) (21 - 669)  BP: 100/53 (09-14-19 @ 08:00) (100/53 - 121/58)  RR: 11 (09-14-19 @ 11:30) (7 - 26)  SpO2: 94% (09-14-19 @ 11:30) (93% - 100%)  Wt(kg): --  Height (cm): 165.1 (09-14-19 @ 05:15)  Weight (kg): 89.4 (09-14-19 @ 05:15)  BMI (kg/m2): 32.8 (09-14-19 @ 05:15)  BSA (m2): 1.97 (09-14-19 @ 05:15)      09-13-19 @ 07:01  -  09-14-19 @ 07:00  --------------------------------------------------------  IN: 1127 mL / OUT: 1995 mL / NET: -868 mL    09-14-19 @ 07:01  -  09-14-19 @ 12:29  --------------------------------------------------------  IN: 63 mL / OUT: 140 mL / NET: -77 mL      Physical Exam:  	Gen: NAD   	HEENT: PERRL, supple neck, clear oropharynx  	Pulm: CTA B/L  	CV: RRR, S1S2; no rub  	Back: No spinal or CVA tenderness; no sacral edema  	Abd: +BS, soft, nontender/nondistended  	: No suprapubic tenderness  	UE: Warm, FROM, no clubbing, intact strength; no edema; no asterixis  	LE: Warm, FROM, no clubbing, intact strength; no edema  	Neuro: No focal deficits, intact gait  	Psych: Normal affect and mood  	Skin: Warm, without rashes  	Vascular access: AVF ; tunneled CVC    LABS/STUDIES  --------------------------------------------------------------------------------              10.0   17.34 >-----------<  139      [09-14-19 @ 02:47]              31.1     138  |  98  |  33.0  ----------------------------<  123      [09-14-19 @ 02:47]  5.6   |  23.0  |  4.56        Ca     9.2     [09-14-19 @ 02:47]      Mg     2.9     [09-14-19 @ 02:47]      Phos  1.4     [09-13-19 @ 14:33]    TPro  6.1  /  Alb  4.3  /  TBili  0.6  /  DBili  0.2  /  AST  19  /  ALT  9   /  AlkPhos  71  [09-14-19 @ 02:47]    PT/INR: PT 12.2 , INR 1.06       [09-14-19 @ 02:47]  PTT: 28.8       [09-14-19 @ 02:47]    Troponin 0.28      [09-14-19 @ 02:47]        [09-14-19 @ 02:47]    Creatinine Trend:  SCr 4.56 [09-14 @ 02:47]  SCr 3.78 [09-13 @ 14:33]  SCr 5.14 [09-13 @ 07:49]  SCr 5.31 [08-29 @ 05:51]  SCr 3.40 [08-28 @ 06:05]    Urinalysis - [08-20-19 @ 13:32]      Color Yellow / Appearance Clear / SG 1.005 / pH 7.0      Gluc Negative / Ketone Negative  / Bili Negative / Urobili Negative       Blood Small / Protein 100 / Leuk Est Negative / Nitrite Negative      RBC 3-5 / WBC 0-2 / Hyaline  / Gran  / Sq Epi  / Non Sq Epi Occasional / Bacteria Occasional      Iron 37, TIBC 204, %sat 18      [08-20-19 @ 15:20]  Ferritin 305      [08-20-19 @ 15:20]  PTH -- (Ca 6.9)      [08-20-19 @ 23:33]   456  Vitamin D (25OH) 11.3      [08-21-19 @ 00:00]  HbA1c 8.4      [08-20-19 @ 13:09]  TSH 1.97      [08-20-19 @ 13:09]  Lipid: chol 209, , HDL 32,       [08-20-19 @ 13:09]    HBsAb <3.0      [08-21-19 @ 22:09]  HBsAg Nonreact      [08-21-19 @ 22:09]  HBcAb Nonreact      [08-21-19 @ 22:09]  HCV 0.12, Nonreact      [08-21-19 @ 17:20]

## 2019-09-14 NOTE — PROGRESS NOTE ADULT - ASSESSMENT
59 M pnh Active smoker, DM II A1C 8.4, HTN, HLD, prior CVA, sciatica, non compliant, poor historian, recent admission with acute CKD/NSTEMI new to HD via right SCV permacath also with maturing LUE fistula . Pt now s/p C3L 9/14

## 2019-09-14 NOTE — PROGRESS NOTE ADULT - SUBJECTIVE AND OBJECTIVE BOX
Subjective: no c/o incisional pain at this time. Denies CP, SOB, palpitations, N/V, other c/o.    T(C): 36.3 (09-14-19 @ 01:00), Max: 36.8 (09-13-19 @ 05:46)  HR: 65 (09-14-19 @ 01:45) (65 - 83)  BP: 186/71 (09-13-19 @ 05:46) (186/71 - 186/71)  ABP: 103/48 (09-14-19 @ 01:45) (92/43 - 144/63)  ABP(mean): 67 (09-14-19 @ 01:45) (60 - 90)  RR: 11 (09-14-19 @ 01:45) (7 - 27)  SpO2: 100% (09-14-19 @ 01:45) (99% - 100%)  Wt(kg): --  CVP(mm Hg): 4 (09-14-19 @ 01:45) (2 - 12)  CO: --  CI: --  PA: --   Mode: CPAP with PS  FiO2: 50  PEEP: 5  PS: 5  MAP: 8      I&O's Detail    13 Sep 2019 07:01  -  14 Sep 2019 02:28  --------------------------------------------------------  IN:    Albumin 5%  - 250 mL: 750 mL    insulin regular Infusion: 42 mL    norepinephrine Infusion: 20 mL    sodium chloride 0.9%.: 140 mL    sodium chloride 0.9%.: 70 mL  Total IN: 1022 mL    OUT:    Chest Tube: 45 mL    Chest Tube: 200 mL    Indwelling Catheter - Urethral: 1470 mL  Total OUT: 1715 mL    Total NET: -693 mL          LABS: All Lab data reviewed and analyzed                        10.6   11.99 )-----------( 120      ( 13 Sep 2019 14:33 )             32.5     09-13    138  |  97<L>  |  27.0<H>  ----------------------------<  164<H>  4.9   |  21.0<L>  |  3.78<H>    Ca    9.5      13 Sep 2019 14:33  Phos  1.4     09-13  Mg     3.1     09-13    TPro  5.6<L>  /  Alb  4.0  /  TBili  0.6  /  DBili  0.2  /  AST  15  /  ALT  9   /  AlkPhos  77  09-13    PTT - ( 13 Sep 2019 14:33 )  PTT:29.1 sec  CARDIAC MARKERS ( 13 Sep 2019 14:33 )   U/L / CKMB7.2 ng/mL / Troponin T0.08 ng/mL /      ABG - ( 13 Sep 2019 19:30 )  pH, Arterial: 7.34  pH, Blood: x     /  pCO2: 44    /  pO2: 181   / HCO3: 22    / Base Excess: -2.3  /  SaO2: 100               CAPILLARY BLOOD GLUCOSE      POCT Blood Glucose.: 109 mg/dL (14 Sep 2019 02:14)  POCT Blood Glucose.: 110 mg/dL (14 Sep 2019 01:54)  POCT Blood Glucose.: 131 mg/dL (13 Sep 2019 23:57)  POCT Blood Glucose.: 151 mg/dL (13 Sep 2019 21:58)  POCT Blood Glucose.: 168 mg/dL (13 Sep 2019 20:48)  POCT Blood Glucose.: 166 mg/dL (13 Sep 2019 18:50)  POCT Blood Glucose.: 169 mg/dL (13 Sep 2019 17:57)  POCT Blood Glucose.: 168 mg/dL (13 Sep 2019 17:04)  POCT Blood Glucose.: 182 mg/dL (13 Sep 2019 15:59)  POCT Blood Glucose.: 190 mg/dL (13 Sep 2019 14:56)  POCT Blood Glucose.: 172 mg/dL (13 Sep 2019 07:09)           RADIOLOGY: - Reviewed and analyzed   CXR: pending    HOSPITAL MEDICATIONS: All medications reviewed and analyzed  MEDICATIONS  (STANDING):  albumin human  5% IVPB 250 milliLiter(s) IV Intermittent once  aspirin enteric coated 325 milliGRAM(s) Oral daily  atorvastatin 40 milliGRAM(s) Oral at bedtime  buDESOnide 160 MICROgram(s)/formoterol 4.5 MICROgram(s) Inhaler 2 Puff(s) Inhalation two times a day  cefuroxime  IVPB 1500 milliGRAM(s) IV Intermittent every 24 hours  chlorhexidine 0.12% Liquid 5 milliLiter(s) Oral Mucosa every 4 hours  chlorhexidine 2% Cloths 1 Application(s) Topical daily  clopidogrel Tablet 75 milliGRAM(s) Oral daily  docusate sodium 100 milliGRAM(s) Oral three times a day  insulin regular Infusion 2 Unit(s)/Hr (2 mL/Hr) IV Continuous <Continuous>  norepinephrine Infusion 0.05 MICROgram(s)/kG/Min (8.381 mL/Hr) IV Continuous <Continuous>  pantoprazole    Tablet 40 milliGRAM(s) Oral daily  polyethylene glycol 3350 17 Gram(s) Oral daily  potassium chloride  10 mEq/50 mL IVPB 10 milliEquivalent(s) IV Intermittent every 1 hour  potassium chloride  10 mEq/50 mL IVPB 10 milliEquivalent(s) IV Intermittent every 1 hour  potassium chloride  10 mEq/50 mL IVPB 10 milliEquivalent(s) IV Intermittent every 1 hour  sodium chloride 0.9%. 1000 milliLiter(s) (10 mL/Hr) IV Continuous <Continuous>  sodium chloride 0.9%. 1000 milliLiter(s) (5 mL/Hr) IV Continuous <Continuous>  vancomycin  IVPB 1000 milliGRAM(s) IV Intermittent every 24 hours    MEDICATIONS  (PRN):          Lines: right IJ intro and right radial ashanti 9/14m right tunneled IJ permacath    Physical Exam  Neuro: A+O x 3, non-focal, speech clear and intact  HEENT:  NCAT, PERRL, EOMI. No conjuctival edema or iIcterus, no thrush.  No ETT or NGT/OGT  Neck:  ROM intact, no JVD, no nodes or masses palpable, trachea midline, no tracheostomy  Pulm: CTA, good air entry, equal bilaterally, no rales/rhonchi/wheezing, no accessory muscle use noted  Chest:        mediastinal CT and left pleural CT all with dressings intact and no air leak, no subQ emphysema       +PW attatched to pacing box  CV: RRR, S1, S2. No murmurs, rubs, or gallops noted.  Abd: +BSx4. Soft, nontender, nondistended.  : barkley in situ to bedside drainage  Ext: ESTRADA x 4, no edema, no cyanosis or clubbing, distal motor/neuro/circ intact, LUE palpable thrill over AV fistula site   Skin: warm, dry, no rashes  Incisions: midsternal C/D/I/stable w/ dressing        Case including assessment/plan of care discussed with   CT surgery attending.  Critical Care time:  35    minutes of noncontinuos critical care time spent evaluating/treating, reviewing imaging, labs, discussing case with multidisciplinary team, discussing plans/goals of care with patient/family to prevent further life threatening depreciation of the patient's condition. Non-inclusive is procedure time.       59yMale with PMH     CABG, with LILIBETH      PAST MEDICAL & SURGICAL HISTORY:  HLD (hyperlipidemia)  Cerebrovascular accident (CVA): Poor historian, unaware of date  DM2 (diabetes mellitus, type 2)  HTN (hypertension)  No significant past surgical history

## 2019-09-14 NOTE — CONSULT NOTE ADULT - ASSESSMENT
1) ESRD on HD  2) Anemia of renal dx  3) MBD of renal dx  4) Vol HTN    Daily phosphorus; HOLD PHOSPHORUS BINDER; Phos 1.4;  Anemia @ goal HGB 10 holding ALINE for now;  Consented for HD  On machine now  Tolerating  d/w CTU PA

## 2019-09-14 NOTE — CONSULT NOTE ADULT - SUBJECTIVE AND OBJECTIVE BOX
Vascular surgery consult: Patient is here for a CABG. Vascular surgery was consulted due to AVF creation 1 month ago by Dr. Roberts. Patient was seen and examined bedside w/ Dr. Roberts. Upon assessment of the AVF it was found to still not be matured and too small for usage. Good thrill was noted. Patient denied numbness, tingling, pain, bleeding, chest pain, SOB.      MEDICATIONS  (STANDING):  albumin human  5% IVPB 250 milliLiter(s) IV Intermittent once  aspirin enteric coated 325 milliGRAM(s) Oral daily  atorvastatin 40 milliGRAM(s) Oral at bedtime  buDESOnide 160 MICROgram(s)/formoterol 4.5 MICROgram(s) Inhaler 2 Puff(s) Inhalation two times a day  cefuroxime  IVPB 1500 milliGRAM(s) IV Intermittent every 24 hours  chlorhexidine 0.12% Liquid 5 milliLiter(s) Oral Mucosa every 4 hours  chlorhexidine 2% Cloths 1 Application(s) Topical daily  clopidogrel Tablet 75 milliGRAM(s) Oral daily  docusate sodium 100 milliGRAM(s) Oral three times a day  heparin  Injectable 5000 Unit(s) SubCutaneous every 8 hours  insulin regular Infusion 2 Unit(s)/Hr (2 mL/Hr) IV Continuous <Continuous>  norepinephrine Infusion 0.05 MICROgram(s)/kG/Min (8.381 mL/Hr) IV Continuous <Continuous>  pantoprazole    Tablet 40 milliGRAM(s) Oral daily  polyethylene glycol 3350 17 Gram(s) Oral daily  potassium chloride  10 mEq/50 mL IVPB 10 milliEquivalent(s) IV Intermittent every 1 hour  potassium chloride  10 mEq/50 mL IVPB 10 milliEquivalent(s) IV Intermittent every 1 hour  potassium chloride  10 mEq/50 mL IVPB 10 milliEquivalent(s) IV Intermittent every 1 hour  sodium chloride 0.9%. 1000 milliLiter(s) (10 mL/Hr) IV Continuous <Continuous>  sodium chloride 0.9%. 1000 milliLiter(s) (5 mL/Hr) IV Continuous <Continuous>  vancomycin  IVPB 1000 milliGRAM(s) IV Intermittent once    MEDICATIONS  (PRN):  oxyCODONE    5 mG/acetaminophen 325 mG 1 Tablet(s) Oral every 4 hours PRN Moderate Pain (4 - 6)      Vital Signs Last 24 Hrs  T(C): 36.8 (14 Sep 2019 15:00), Max: 36.8 (13 Sep 2019 19:00)  T(F): 98.3 (14 Sep 2019 15:00), Max: 98.3 (14 Sep 2019 15:00)  HR: 68 (14 Sep 2019 15:30) (63 - 669)  BP: 100/53 (14 Sep 2019 08:00) (100/53 - 121/58)  BP(mean): 74 (14 Sep 2019 08:00) (74 - 84)  RR: 13 (14 Sep 2019 15:30) (7 - 21)  SpO2: 96% (14 Sep 2019 15:30) (86% - 100%)      09-13 - 09-14  --------------------------------------------------------  IN:    Albumin 5%  - 250 mL: 750 mL    insulin regular Infusion: 57 mL    norepinephrine Infusion: 35 mL    sodium chloride 0.9%.: 95 mL    sodium chloride 0.9%.: 190 mL  Total IN: 1127 mL    OUT:    Chest Tube: 280 mL    Chest Tube: 75 mL    Indwelling Catheter - Urethral: 1640 mL  Total OUT: 1995 mL    Total NET: -868 mL      09-14 - 09-14  --------------------------------------------------------  IN:    insulin regular Infusion: 31 mL    norepinephrine Infusion: 33 mL    Other: 800 mL    sodium chloride 0.9%.: 40 mL    sodium chloride 0.9%.: 80 mL    Solution: 50 mL  Total IN: 1034 mL    OUT:    Chest Tube: 40 mL    Chest Tube: 10 mL    Indwelling Catheter - Urethral: 290 mL    Other: 1800 mL  Total OUT: 2140 mL    Total NET: -1106 mL      Physical Exam:    Constitutional: NAD  HEENT: PERRL, EOMI  Neck: No JVD, FROM without pain  Respiratory: Respirations non-labored, no accessory muscle use  Cardiovascular: Regular rate & rhythm  Gastrointestinal: Soft, non-tender, non-distended  Extremities: AVF in left arm with good palpable thrill, but too small for usage      LABS:                        10.0   17.34 )-----------( 139      ( 14 Sep 2019 02:47 )             31.1     09-14    138  |  98  |  33.0<H>  ----------------------------<  123<H>  5.6<H>   |  23.0  |  4.56<H>    Ca    9.2      14 Sep 2019 02:47  Phos  1.4     09-13  Mg     2.9     09-14    TPro  6.1<L>  /  Alb  4.3  /  TBili  0.6  /  DBili  0.2  /  AST  19  /  ALT  9   /  AlkPhos  71  09-14    PT/INR - ( 14 Sep 2019 02:47 )   PT: 12.2 sec;   INR: 1.06 ratio         PTT - ( 14 Sep 2019 02:47 )  PTT:28.8 sec

## 2019-09-14 NOTE — CONSULT NOTE ADULT - ASSESSMENT
Assessment: 59 year old male w/ AVF creation 1 month ago here to undergo CABG this admission.    Plan:  AVF not mature for usage yet.  Continue using port for HD for now.  Will reassess in 2-3 weeks for progression and will possibly need ballooning if not fully matured at that time.   Will continue to follow peripherally while admitted.

## 2019-09-15 LAB
ANION GAP SERPL CALC-SCNC: 16 MMOL/L — SIGNIFICANT CHANGE UP (ref 5–17)
BUN SERPL-MCNC: 29 MG/DL — HIGH (ref 8–20)
CALCIUM SERPL-MCNC: 7.6 MG/DL — LOW (ref 8.6–10.2)
CHLORIDE SERPL-SCNC: 99 MMOL/L — SIGNIFICANT CHANGE UP (ref 98–107)
CO2 SERPL-SCNC: 23 MMOL/L — SIGNIFICANT CHANGE UP (ref 22–29)
CREAT SERPL-MCNC: 3.77 MG/DL — HIGH (ref 0.5–1.3)
GLUCOSE BLDC GLUCOMTR-MCNC: 170 MG/DL — HIGH (ref 70–99)
GLUCOSE BLDC GLUCOMTR-MCNC: 185 MG/DL — HIGH (ref 70–99)
GLUCOSE BLDC GLUCOMTR-MCNC: 201 MG/DL — HIGH (ref 70–99)
GLUCOSE BLDC GLUCOMTR-MCNC: 211 MG/DL — HIGH (ref 70–99)
GLUCOSE SERPL-MCNC: 208 MG/DL — HIGH (ref 70–115)
HCT VFR BLD CALC: 30.7 % — LOW (ref 39–50)
HGB BLD-MCNC: 9.6 G/DL — LOW (ref 13–17)
MAGNESIUM SERPL-MCNC: 2 MG/DL — SIGNIFICANT CHANGE UP (ref 1.6–2.6)
MCHC RBC-ENTMCNC: 27.1 PG — SIGNIFICANT CHANGE UP (ref 27–34)
MCHC RBC-ENTMCNC: 31.3 GM/DL — LOW (ref 32–36)
MCV RBC AUTO: 86.7 FL — SIGNIFICANT CHANGE UP (ref 80–100)
PLATELET # BLD AUTO: 136 K/UL — LOW (ref 150–400)
POTASSIUM SERPL-MCNC: 4.8 MMOL/L — SIGNIFICANT CHANGE UP (ref 3.5–5.3)
POTASSIUM SERPL-SCNC: 4.8 MMOL/L — SIGNIFICANT CHANGE UP (ref 3.5–5.3)
RBC # BLD: 3.54 M/UL — LOW (ref 4.2–5.8)
RBC # FLD: 15.9 % — HIGH (ref 10.3–14.5)
SODIUM SERPL-SCNC: 138 MMOL/L — SIGNIFICANT CHANGE UP (ref 135–145)
WBC # BLD: 14.84 K/UL — HIGH (ref 3.8–10.5)
WBC # FLD AUTO: 14.84 K/UL — HIGH (ref 3.8–10.5)

## 2019-09-15 PROCEDURE — 99233 SBSQ HOSP IP/OBS HIGH 50: CPT

## 2019-09-15 PROCEDURE — 71045 X-RAY EXAM CHEST 1 VIEW: CPT | Mod: 26

## 2019-09-15 PROCEDURE — 93010 ELECTROCARDIOGRAM REPORT: CPT

## 2019-09-15 PROCEDURE — 99024 POSTOP FOLLOW-UP VISIT: CPT

## 2019-09-15 RX ORDER — ERYTHROPOIETIN 10000 [IU]/ML
10000 INJECTION, SOLUTION INTRAVENOUS; SUBCUTANEOUS
Refills: 0 | Status: DISCONTINUED | OUTPATIENT
Start: 2019-09-15 | End: 2019-09-18

## 2019-09-15 RX ORDER — METOPROLOL TARTRATE 50 MG
25 TABLET ORAL
Refills: 0 | Status: DISCONTINUED | OUTPATIENT
Start: 2019-09-15 | End: 2019-09-17

## 2019-09-15 RX ORDER — INFLUENZA VIRUS VACCINE 15; 15; 15; 15 UG/.5ML; UG/.5ML; UG/.5ML; UG/.5ML
0.5 SUSPENSION INTRAMUSCULAR ONCE
Refills: 0 | Status: COMPLETED | OUTPATIENT
Start: 2019-09-15 | End: 2019-09-15

## 2019-09-15 RX ORDER — LIDOCAINE 4 G/100G
1 CREAM TOPICAL EVERY 24 HOURS
Refills: 0 | Status: DISCONTINUED | OUTPATIENT
Start: 2019-09-15 | End: 2019-09-16

## 2019-09-15 RX ADMIN — BUDESONIDE AND FORMOTEROL FUMARATE DIHYDRATE 2 PUFF(S): 160; 4.5 AEROSOL RESPIRATORY (INHALATION) at 20:32

## 2019-09-15 RX ADMIN — HEPARIN SODIUM 5000 UNIT(S): 5000 INJECTION INTRAVENOUS; SUBCUTANEOUS at 14:12

## 2019-09-15 RX ADMIN — Medication 4: at 16:32

## 2019-09-15 RX ADMIN — ATORVASTATIN CALCIUM 40 MILLIGRAM(S): 80 TABLET, FILM COATED ORAL at 20:44

## 2019-09-15 RX ADMIN — Medication 25 MILLIGRAM(S): at 17:58

## 2019-09-15 RX ADMIN — OXYCODONE AND ACETAMINOPHEN 1 TABLET(S): 5; 325 TABLET ORAL at 18:18

## 2019-09-15 RX ADMIN — Medication 100 MILLIGRAM(S): at 14:11

## 2019-09-15 RX ADMIN — Medication 2: at 08:44

## 2019-09-15 RX ADMIN — Medication 100 MILLIGRAM(S): at 08:46

## 2019-09-15 RX ADMIN — Medication 6 UNIT(S): at 16:32

## 2019-09-15 RX ADMIN — CLOPIDOGREL BISULFATE 75 MILLIGRAM(S): 75 TABLET, FILM COATED ORAL at 11:28

## 2019-09-15 RX ADMIN — BUDESONIDE AND FORMOTEROL FUMARATE DIHYDRATE 2 PUFF(S): 160; 4.5 AEROSOL RESPIRATORY (INHALATION) at 09:30

## 2019-09-15 RX ADMIN — CHLORHEXIDINE GLUCONATE 1 APPLICATION(S): 213 SOLUTION TOPICAL at 11:32

## 2019-09-15 RX ADMIN — Medication 100 MILLIGRAM(S): at 20:44

## 2019-09-15 RX ADMIN — INSULIN GLARGINE 20 UNIT(S): 100 INJECTION, SOLUTION SUBCUTANEOUS at 21:11

## 2019-09-15 RX ADMIN — OXYCODONE AND ACETAMINOPHEN 1 TABLET(S): 5; 325 TABLET ORAL at 17:29

## 2019-09-15 RX ADMIN — HEPARIN SODIUM 5000 UNIT(S): 5000 INJECTION INTRAVENOUS; SUBCUTANEOUS at 20:45

## 2019-09-15 RX ADMIN — Medication 6 UNIT(S): at 11:40

## 2019-09-15 RX ADMIN — Medication 100 MILLIGRAM(S): at 08:45

## 2019-09-15 RX ADMIN — POLYETHYLENE GLYCOL 3350 17 GRAM(S): 17 POWDER, FOR SOLUTION ORAL at 11:28

## 2019-09-15 RX ADMIN — Medication 4: at 11:40

## 2019-09-15 RX ADMIN — Medication 6 UNIT(S): at 08:44

## 2019-09-15 RX ADMIN — Medication 325 MILLIGRAM(S): at 11:28

## 2019-09-15 RX ADMIN — PANTOPRAZOLE SODIUM 40 MILLIGRAM(S): 20 TABLET, DELAYED RELEASE ORAL at 11:28

## 2019-09-15 NOTE — DIETITIAN INITIAL EVALUATION ADULT. - OTHER INFO
59 M pnh Active smoker, DM II A1C 8.4, HTN, HLD, prior CVA, sciatica, non compliant, poor historian, recent admission with acute CKD/NSTEMI new to HD via right SCV permacath also with maturing LUE fistula . Pt now s/p C3L.    Pt here for CABG. Pt was seen by nutrition dept last month, diet education was provided. Pt aware of diet restrictions, poor adherence. Tolerating diet, 100% PO intake.

## 2019-09-15 NOTE — PROGRESS NOTE ADULT - ASSESSMENT
1) ESRD on HD  2) Anemia of renal dx  3) MBD of renal dx  4) Vol HTN    Daily phosphorus; HOLD PHOSPHORUS BINDER; Phos 1.4; REPEAT   Anemia @ goal HGB 10 holding ALINE for now;  Restart epo 10k units TIW  HD in AM  d/w CTU PA

## 2019-09-15 NOTE — PROGRESS NOTE ADULT - ASSESSMENT
59 M pnh Active smoker, DM II A1C 8.4, HTN, HLD, prior CVA, sciatica, non compliant, poor historian, recent admission with acute CKD/NSTEMI new to HD via right SCV permacath also with maturing LUE fistula . Pt now s/p C3L 9/14  9/15 plan to deintensify in AM

## 2019-09-15 NOTE — PROGRESS NOTE ADULT - SUBJECTIVE AND OBJECTIVE BOX
Subjective: no c/o incisional pain at this time. Denies CP, SOB, palpitations, N/V, other c/o.    T(C): 37.2 (09-14-19 @ 18:00), Max: 37.2 (09-14-19 @ 18:00)  HR: 75 (09-15-19 @ 00:00) (63 - 669)  BP: 100/53 (09-14-19 @ 08:00) (100/53 - 121/58)  ABP: 112/52 (09-15-19 @ 00:00) (74/38 - 141/57)  ABP(mean): 74 (09-15-19 @ 00:00) (44 - 82)  RR: 19 (09-15-19 @ 00:00) (10 - 25)  SpO2: 100% (09-15-19 @ 00:00) (86% - 100%)  Wt(kg): --  CVP(mm Hg): 17 (09-15-19 @ 00:00) (3 - 18)  CO: --  CI: --  PA: --       I&O's Detail    13 Sep 2019 07:01  -  14 Sep 2019 07:00  --------------------------------------------------------  IN:    Albumin 5%  - 250 mL: 750 mL    insulin regular Infusion: 57 mL    norepinephrine Infusion: 35 mL    sodium chloride 0.9%.: 95 mL    sodium chloride 0.9%.: 190 mL  Total IN: 1127 mL    OUT:    Chest Tube: 280 mL    Chest Tube: 75 mL    Indwelling Catheter - Urethral: 1640 mL  Total OUT: 1995 mL    Total NET: -868 mL      14 Sep 2019 07:01  -  15 Sep 2019 01:22  --------------------------------------------------------  IN:    insulin regular Infusion: 56 mL    norepinephrine Infusion: 36 mL    Oral Fluid: 240 mL    Other: 800 mL    sodium chloride 0.9%.: 170 mL    sodium chloride 0.9%.: 85 mL    Solution: 50 mL    Solution: 250 mL  Total IN: 1687 mL    OUT:    Chest Tube: 100 mL    Chest Tube: 25 mL    Indwelling Catheter - Urethral: 560 mL    Other: 1800 mL  Total OUT: 2485 mL    Total NET: -798 mL          LABS: All Lab data reviewed and analyzed                        10.0   17.34 )-----------( 139      ( 14 Sep 2019 02:47 )             31.1     09-14    137  |  98  |  21.0<H>  ----------------------------<  146<H>  4.5   |  24.0  |  3.07<H>    Ca    8.3<L>      14 Sep 2019 17:09  Phos  1.4     09-13  Mg     2.9     09-14    TPro  6.1<L>  /  Alb  4.3  /  TBili  0.6  /  DBili  0.2  /  AST  19  /  ALT  9   /  AlkPhos  71  09-14    PT/INR - ( 14 Sep 2019 02:47 )   PT: 12.2 sec;   INR: 1.06 ratio         PTT - ( 14 Sep 2019 02:47 )  PTT:28.8 sec  CARDIAC MARKERS ( 14 Sep 2019 02:47 )   U/L / CKMB7.3 ng/mL / Troponin T0.28 ng/mL /      ABG - ( 13 Sep 2019 19:30 )  pH, Arterial: 7.34  pH, Blood: x     /  pCO2: 44    /  pO2: 181   / HCO3: 22    / Base Excess: -2.3  /  SaO2: 100               CAPILLARY BLOOD GLUCOSE      POCT Blood Glucose.: 137 mg/dL (14 Sep 2019 21:41)  POCT Blood Glucose.: 172 mg/dL (14 Sep 2019 18:30)  POCT Blood Glucose.: 140 mg/dL (14 Sep 2019 16:47)  POCT Blood Glucose.: 117 mg/dL (14 Sep 2019 15:32)  POCT Blood Glucose.: 127 mg/dL (14 Sep 2019 13:46)  POCT Blood Glucose.: 131 mg/dL (14 Sep 2019 12:34)  POCT Blood Glucose.: 165 mg/dL (14 Sep 2019 11:21)  POCT Blood Glucose.: 206 mg/dL (14 Sep 2019 10:11)  POCT Blood Glucose.: 122 mg/dL (14 Sep 2019 07:59)  POCT Blood Glucose.: 138 mg/dL (14 Sep 2019 06:37)  POCT Blood Glucose.: 145 mg/dL (14 Sep 2019 05:03)  POCT Blood Glucose.: 153 mg/dL (14 Sep 2019 04:00)  POCT Blood Glucose.: 158 mg/dL (14 Sep 2019 03:02)  POCT Blood Glucose.: 109 mg/dL (14 Sep 2019 02:14)  POCT Blood Glucose.: 110 mg/dL (14 Sep 2019 01:54)           RADIOLOGY: - Reviewed and analyzed   CXR: pending    HOSPITAL MEDICATIONS: All medications reviewed and analyzed  MEDICATIONS  (STANDING):  albumin human  5% IVPB 250 milliLiter(s) IV Intermittent once  aspirin enteric coated 325 milliGRAM(s) Oral daily  atorvastatin 40 milliGRAM(s) Oral at bedtime  buDESOnide 160 MICROgram(s)/formoterol 4.5 MICROgram(s) Inhaler 2 Puff(s) Inhalation two times a day  cefuroxime  IVPB 1500 milliGRAM(s) IV Intermittent every 24 hours  chlorhexidine 2% Cloths 1 Application(s) Topical daily  clopidogrel Tablet 75 milliGRAM(s) Oral daily  dextrose 5%. 1000 milliLiter(s) (50 mL/Hr) IV Continuous <Continuous>  dextrose 50% Injectable 12.5 Gram(s) IV Push once  dextrose 50% Injectable 25 Gram(s) IV Push once  dextrose 50% Injectable 25 Gram(s) IV Push once  docusate sodium 100 milliGRAM(s) Oral three times a day  heparin  Injectable 5000 Unit(s) SubCutaneous every 8 hours  insulin glargine Injectable (LANTUS) 20 Unit(s) SubCutaneous at bedtime  insulin lispro (HumaLOG) corrective regimen sliding scale   SubCutaneous three times a day before meals  insulin lispro Injectable (HumaLOG) 6 Unit(s) SubCutaneous three times a day before meals  insulin regular Infusion 2 Unit(s)/Hr (2 mL/Hr) IV Continuous <Continuous>  pantoprazole    Tablet 40 milliGRAM(s) Oral daily  polyethylene glycol 3350 17 Gram(s) Oral daily  potassium chloride  10 mEq/50 mL IVPB 10 milliEquivalent(s) IV Intermittent every 1 hour  potassium chloride  10 mEq/50 mL IVPB 10 milliEquivalent(s) IV Intermittent every 1 hour  potassium chloride  10 mEq/50 mL IVPB 10 milliEquivalent(s) IV Intermittent every 1 hour  sodium chloride 0.9%. 1000 milliLiter(s) (10 mL/Hr) IV Continuous <Continuous>  sodium chloride 0.9%. 1000 milliLiter(s) (5 mL/Hr) IV Continuous <Continuous>    MEDICATIONS  (PRN):  ALBUTerol/ipratropium for Nebulization 3 milliLiter(s) Nebulizer every 6 hours PRN Shortness of Breath and/or Wheezing  dextrose 40% Gel 15 Gram(s) Oral once PRN Blood Glucose LESS THAN 70 milliGRAM(s)/deciliter  glucagon  Injectable 1 milliGRAM(s) IntraMuscular once PRN Glucose LESS THAN 70 milligrams/deciliter  oxyCODONE    5 mG/acetaminophen 325 mG 1 Tablet(s) Oral every 4 hours PRN Moderate Pain (4 - 6)          Lines: left IJ intro and right radial ashanti 9/14 right tunneled IJ permacath    Physical Exam  Neuro: A+O x 3, non-focal, speech clear and intact  HEENT:  NCAT, PERRL, EOMI. No conjuctival edema or iIcterus, no thrush.  No ETT or NGT/OGT  Neck:  ROM intact, no JVD, no nodes or masses palpable, trachea midline, no tracheostomy  Pulm: CTA, good air entry, equal bilaterally, no rales/rhonchi/wheezing, no accessory muscle use noted  Chest:        mediastinal CT and left pleural CT all with dressings intact and no air leak, no subQ emphysema      CV: RRR, S1, S2. No murmurs, rubs, or gallops noted.  Abd: +BSx4. Soft, nontender, nondistended.  : barkley in situ to bedside drainage  Ext: ESTRADA x 4, no edema, no cyanosis or clubbing, distal motor/neuro/circ intact, LUE palpable thrill over AV fistula site   Skin: warm, dry, no rashes  Incisions: midsternal C/D/I/stable w/ dressing        Case including assessment/plan of care discussed with   CT surgery attending.  Care time:   30   minutes of noncontinuos critical care time spent evaluating/treating, reviewing imaging, labs, discussing case with multidisciplinary team, discussing plans/goals of care with patient/family to prevent further life threatening depreciation of the patient's condition. Non-inclusive is procedure time.       59yMale with PMH     CABG, with LILIBETH      PAST MEDICAL & SURGICAL HISTORY:  HLD (hyperlipidemia)  Cerebrovascular accident (CVA): Poor historian, unaware of date  DM2 (diabetes mellitus, type 2)  HTN (hypertension)  No significant past surgical history

## 2019-09-15 NOTE — PROGRESS NOTE ADULT - SUBJECTIVE AND OBJECTIVE BOX
[facility] DIVISION OF KIDNEY DISEASES AND HYPERTENSION -- FOLLOW UP NOTE  --------------------------------------------------------------------------------  Chief Complaint: ESRD HD    24 hour events/subjective:  Pt seen/examined  HD done yesterday off schedule; usually MWF  Due tomorrow      PAST HISTORY  --------------------------------------------------------------------------------  No significant changes to PMH, PSH, FHx, SHx, unless otherwise noted    ALLERGIES & MEDICATIONS  --------------------------------------------------------------------------------  Allergies    lactose (Flatulence (Severe); Diarrhea (Severe); Stomach Upset (Severe); Nausea (Severe))  No Known Drug Allergies    Intolerances    OHS (Unknown)    Standing Inpatient Medications  aspirin enteric coated 325 milliGRAM(s) Oral daily  atorvastatin 40 milliGRAM(s) Oral at bedtime  buDESOnide 160 MICROgram(s)/formoterol 4.5 MICROgram(s) Inhaler 2 Puff(s) Inhalation two times a day  chlorhexidine 2% Cloths 1 Application(s) Topical daily  clopidogrel Tablet 75 milliGRAM(s) Oral daily  docusate sodium 100 milliGRAM(s) Oral three times a day  heparin  Injectable 5000 Unit(s) SubCutaneous every 8 hours  influenza   Vaccine 0.5 milliLiter(s) IntraMuscular once  insulin glargine Injectable (LANTUS) 20 Unit(s) SubCutaneous at bedtime  insulin lispro (HumaLOG) corrective regimen sliding scale   SubCutaneous three times a day before meals  insulin lispro Injectable (HumaLOG) 6 Unit(s) SubCutaneous three times a day before meals  pantoprazole    Tablet 40 milliGRAM(s) Oral daily  polyethylene glycol 3350 17 Gram(s) Oral daily    PRN Inpatient Medications  ALBUTerol/ipratropium for Nebulization 3 milliLiter(s) Nebulizer every 6 hours PRN  oxyCODONE    5 mG/acetaminophen 325 mG 1 Tablet(s) Oral every 4 hours PRN      REVIEW OF SYSTEMS  --------------------------------------------------------------------------------  Gen: No weight changes, fatigue, fevers/chills, weakness  Skin: No rashes  Head/Eyes/Ears/Mouth: No headache; Normal hearing; Normal vision w/o blurriness; No sinus pain/discomfort, sore throat  Respiratory: No dyspnea, cough, wheezing, hemoptysis  CV: No chest pain, PND, orthopnea  GI: No abdominal pain, diarrhea, constipation, nausea, vomiting, melena, hematochezia  : No increased frequency, dysuria, hematuria, nocturia  MSK: No joint pain/swelling; no back pain; no edema  Neuro: No dizziness/lightheadedness, weakness, seizures, numbness, tingling  Heme: No easy bruising or bleeding  Endo: No heat/cold intolerance  Psych: No significant nervousness, anxiety, stress, depression    All other systems were reviewed and are negative, except as noted.    VITALS/PHYSICAL EXAM  --------------------------------------------------------------------------------  T(C): 37 (09-15-19 @ 12:00), Max: 37.2 (09-14-19 @ 18:00)  HR: 81 (09-15-19 @ 12:00) (66 - 89)  BP: 126/56 (09-15-19 @ 12:00) (113/56 - 137/61)  RR: 24 (09-15-19 @ 12:00) (13 - 25)  SpO2: 98% (09-15-19 @ 12:00) (91% - 100%)  Wt(kg): --  Height (cm): 165.1 (09-14-19 @ 05:15)  Weight (kg): 89.4 (09-14-19 @ 05:15)  BMI (kg/m2): 32.8 (09-14-19 @ 05:15)  BSA (m2): 1.97 (09-14-19 @ 05:15)      09-14-19 @ 07:01  -  09-15-19 @ 07:00  --------------------------------------------------------  IN: 1762 mL / OUT: 2705 mL / NET: -943 mL    09-15-19 @ 07:01  -  09-15-19 @ 13:15  --------------------------------------------------------  IN: 1010 mL / OUT: 40 mL / NET: 970 mL      Physical Exam:  	Gen: NAD   	HEENT: PERRL, supple neck, clear oropharynx  	Pulm: CTA B/L  	CV: RRR, S1S2; no rub  	Back: No spinal or CVA tenderness; no sacral edema  	Abd: +BS, soft, nontender/nondistended  	: No suprapubic tenderness  	UE: Warm, FROM, no clubbing, intact strength; no edema; no asterixis  	LE: Warm, FROM, no clubbing, intact strength; no edema  	Neuro: No focal deficits, intact gait  	Psych: Normal affect and mood  	Skin: Warm, without rashes  	Vascular access:    LABS/STUDIES  --------------------------------------------------------------------------------              9.6    14.84 >-----------<  136      [09-15-19 @ 02:50]              30.7     138  |  99  |  29.0  ----------------------------<  208      [09-15-19 @ 02:50]  4.8   |  23.0  |  3.77        Ca     7.6     [09-15-19 @ 02:50]      Mg     2.0     [09-15-19 @ 02:50]      Phos  1.4     [09-13-19 @ 14:33]    TPro  6.1  /  Alb  4.3  /  TBili  0.6  /  DBili  0.2  /  AST  19  /  ALT  9   /  AlkPhos  71  [09-14-19 @ 02:47]    PT/INR: PT 12.2 , INR 1.06       [09-14-19 @ 02:47]  PTT: 28.8       [09-14-19 @ 02:47]    Troponin 0.28      [09-14-19 @ 02:47]        [09-14-19 @ 02:47]    Creatinine Trend:  SCr 3.77 [09-15 @ 02:50]  SCr 3.07 [09-14 @ 17:09]  SCr 4.56 [09-14 @ 02:47]  SCr 3.78 [09-13 @ 14:33]  SCr 5.14 [09-13 @ 07:49]    Urinalysis - [08-20-19 @ 13:32]      Color Yellow / Appearance Clear / SG 1.005 / pH 7.0      Gluc Negative / Ketone Negative  / Bili Negative / Urobili Negative       Blood Small / Protein 100 / Leuk Est Negative / Nitrite Negative      RBC 3-5 / WBC 0-2 / Hyaline  / Gran  / Sq Epi  / Non Sq Epi Occasional / Bacteria Occasional      Iron 37, TIBC 204, %sat 18      [08-20-19 @ 15:20]  Ferritin 305      [08-20-19 @ 15:20]  PTH -- (Ca 6.9)      [08-20-19 @ 23:33]   456  Vitamin D (25OH) 11.3      [08-21-19 @ 00:00]  HbA1c 8.4      [08-20-19 @ 13:09]  TSH 1.97      [08-20-19 @ 13:09]  Lipid: chol 209, , HDL 32,       [08-20-19 @ 13:09]    HBsAb <3.0      [08-21-19 @ 22:09]  HBsAg Nonreact      [08-21-19 @ 22:09]  HBcAb Nonreact      [08-21-19 @ 22:09]  HCV 0.12, Nonreact      [08-21-19 @ 17:20]

## 2019-09-16 DIAGNOSIS — F17.200 NICOTINE DEPENDENCE, UNSPECIFIED, UNCOMPLICATED: ICD-10-CM

## 2019-09-16 DIAGNOSIS — Z29.9 ENCOUNTER FOR PROPHYLACTIC MEASURES, UNSPECIFIED: ICD-10-CM

## 2019-09-16 LAB
ANION GAP SERPL CALC-SCNC: 15 MMOL/L — SIGNIFICANT CHANGE UP (ref 5–17)
BUN SERPL-MCNC: 59 MG/DL — HIGH (ref 8–20)
CALCIUM SERPL-MCNC: 7 MG/DL — LOW (ref 8.6–10.2)
CHLORIDE SERPL-SCNC: 97 MMOL/L — LOW (ref 98–107)
CO2 SERPL-SCNC: 24 MMOL/L — SIGNIFICANT CHANGE UP (ref 22–29)
CREAT SERPL-MCNC: 4.75 MG/DL — HIGH (ref 0.5–1.3)
GLUCOSE BLDC GLUCOMTR-MCNC: 109 MG/DL — HIGH (ref 70–99)
GLUCOSE BLDC GLUCOMTR-MCNC: 113 MG/DL — HIGH (ref 70–99)
GLUCOSE BLDC GLUCOMTR-MCNC: 113 MG/DL — HIGH (ref 70–99)
GLUCOSE BLDC GLUCOMTR-MCNC: 88 MG/DL — SIGNIFICANT CHANGE UP (ref 70–99)
GLUCOSE SERPL-MCNC: 127 MG/DL — HIGH (ref 70–115)
HCT VFR BLD CALC: 28.9 % — LOW (ref 39–50)
HGB BLD-MCNC: 9.2 G/DL — LOW (ref 13–17)
MAGNESIUM SERPL-MCNC: 2 MG/DL — SIGNIFICANT CHANGE UP (ref 1.6–2.6)
MCHC RBC-ENTMCNC: 27.5 PG — SIGNIFICANT CHANGE UP (ref 27–34)
MCHC RBC-ENTMCNC: 31.8 GM/DL — LOW (ref 32–36)
MCV RBC AUTO: 86.3 FL — SIGNIFICANT CHANGE UP (ref 80–100)
PHOSPHATE SERPL-MCNC: 3.7 MG/DL — SIGNIFICANT CHANGE UP (ref 2.4–4.7)
PLATELET # BLD AUTO: 142 K/UL — LOW (ref 150–400)
POTASSIUM SERPL-MCNC: 4.6 MMOL/L — SIGNIFICANT CHANGE UP (ref 3.5–5.3)
POTASSIUM SERPL-SCNC: 4.6 MMOL/L — SIGNIFICANT CHANGE UP (ref 3.5–5.3)
RBC # BLD: 3.35 M/UL — LOW (ref 4.2–5.8)
RBC # FLD: 16 % — HIGH (ref 10.3–14.5)
SODIUM SERPL-SCNC: 136 MMOL/L — SIGNIFICANT CHANGE UP (ref 135–145)
URATE SERPL-MCNC: 5.8 MG/DL — SIGNIFICANT CHANGE UP (ref 3.4–7)
WBC # BLD: 14.03 K/UL — HIGH (ref 3.8–10.5)
WBC # FLD AUTO: 14.03 K/UL — HIGH (ref 3.8–10.5)

## 2019-09-16 PROCEDURE — 73564 X-RAY EXAM KNEE 4 OR MORE: CPT | Mod: 26,RT

## 2019-09-16 PROCEDURE — 90937 HEMODIALYSIS REPEATED EVAL: CPT

## 2019-09-16 PROCEDURE — 71045 X-RAY EXAM CHEST 1 VIEW: CPT | Mod: 26

## 2019-09-16 RX ORDER — INSULIN LISPRO 100/ML
VIAL (ML) SUBCUTANEOUS
Refills: 0 | Status: DISCONTINUED | OUTPATIENT
Start: 2019-09-16 | End: 2019-09-18

## 2019-09-16 RX ORDER — OXYCODONE AND ACETAMINOPHEN 5; 325 MG/1; MG/1
2 TABLET ORAL EVERY 6 HOURS
Refills: 0 | Status: DISCONTINUED | OUTPATIENT
Start: 2019-09-16 | End: 2019-09-18

## 2019-09-16 RX ORDER — SODIUM CHLORIDE 9 MG/ML
3 INJECTION INTRAMUSCULAR; INTRAVENOUS; SUBCUTANEOUS EVERY 8 HOURS
Refills: 0 | Status: DISCONTINUED | OUTPATIENT
Start: 2019-09-16 | End: 2019-09-18

## 2019-09-16 RX ORDER — OXYCODONE AND ACETAMINOPHEN 5; 325 MG/1; MG/1
1 TABLET ORAL ONCE
Refills: 0 | Status: DISCONTINUED | OUTPATIENT
Start: 2019-09-16 | End: 2019-09-16

## 2019-09-16 RX ADMIN — HEPARIN SODIUM 5000 UNIT(S): 5000 INJECTION INTRAVENOUS; SUBCUTANEOUS at 21:21

## 2019-09-16 RX ADMIN — Medication 6 UNIT(S): at 17:43

## 2019-09-16 RX ADMIN — CLOPIDOGREL BISULFATE 75 MILLIGRAM(S): 75 TABLET, FILM COATED ORAL at 11:17

## 2019-09-16 RX ADMIN — Medication 100 MILLIGRAM(S): at 06:40

## 2019-09-16 RX ADMIN — OXYCODONE AND ACETAMINOPHEN 1 TABLET(S): 5; 325 TABLET ORAL at 20:45

## 2019-09-16 RX ADMIN — Medication 325 MILLIGRAM(S): at 11:16

## 2019-09-16 RX ADMIN — Medication 25 MILLIGRAM(S): at 06:41

## 2019-09-16 RX ADMIN — Medication 25 MILLIGRAM(S): at 17:44

## 2019-09-16 RX ADMIN — BUDESONIDE AND FORMOTEROL FUMARATE DIHYDRATE 2 PUFF(S): 160; 4.5 AEROSOL RESPIRATORY (INHALATION) at 20:25

## 2019-09-16 RX ADMIN — HEPARIN SODIUM 5000 UNIT(S): 5000 INJECTION INTRAVENOUS; SUBCUTANEOUS at 15:29

## 2019-09-16 RX ADMIN — OXYCODONE AND ACETAMINOPHEN 1 TABLET(S): 5; 325 TABLET ORAL at 21:20

## 2019-09-16 RX ADMIN — HEPARIN SODIUM 5000 UNIT(S): 5000 INJECTION INTRAVENOUS; SUBCUTANEOUS at 06:40

## 2019-09-16 RX ADMIN — INSULIN GLARGINE 20 UNIT(S): 100 INJECTION, SOLUTION SUBCUTANEOUS at 21:20

## 2019-09-16 RX ADMIN — OXYCODONE AND ACETAMINOPHEN 1 TABLET(S): 5; 325 TABLET ORAL at 20:00

## 2019-09-16 RX ADMIN — ATORVASTATIN CALCIUM 40 MILLIGRAM(S): 80 TABLET, FILM COATED ORAL at 21:21

## 2019-09-16 RX ADMIN — SODIUM CHLORIDE 3 MILLILITER(S): 9 INJECTION INTRAMUSCULAR; INTRAVENOUS; SUBCUTANEOUS at 15:33

## 2019-09-16 RX ADMIN — Medication 6 UNIT(S): at 07:47

## 2019-09-16 RX ADMIN — Medication 6 UNIT(S): at 11:17

## 2019-09-16 RX ADMIN — SODIUM CHLORIDE 3 MILLILITER(S): 9 INJECTION INTRAMUSCULAR; INTRAVENOUS; SUBCUTANEOUS at 21:27

## 2019-09-16 RX ADMIN — POLYETHYLENE GLYCOL 3350 17 GRAM(S): 17 POWDER, FOR SOLUTION ORAL at 11:17

## 2019-09-16 RX ADMIN — PANTOPRAZOLE SODIUM 40 MILLIGRAM(S): 20 TABLET, DELAYED RELEASE ORAL at 11:16

## 2019-09-16 RX ADMIN — CHLORHEXIDINE GLUCONATE 1 APPLICATION(S): 213 SOLUTION TOPICAL at 11:11

## 2019-09-16 RX ADMIN — OXYCODONE AND ACETAMINOPHEN 1 TABLET(S): 5; 325 TABLET ORAL at 21:50

## 2019-09-16 RX ADMIN — ERYTHROPOIETIN 10000 UNIT(S): 10000 INJECTION, SOLUTION INTRAVENOUS; SUBCUTANEOUS at 11:47

## 2019-09-16 RX ADMIN — Medication 100 MILLIGRAM(S): at 15:28

## 2019-09-16 NOTE — PROGRESS NOTE ADULT - SUBJECTIVE AND OBJECTIVE BOX
[facility] DIVISION OF KIDNEY DISEASES AND HYPERTENSION -- FOLLOW UP NOTE  ---------------------------------------- ----------------------------------------  Chief Complaint: ESRD HD    24 hour events/subjective:  Seen/examined ; on dialysis;  Tolerating; doing well      PAST HISTORY  --------------------------------------------------------------------------------  No significant changes to PMH, PSH, FHx, SHx, unless otherwise noted    ALLERGIES & MEDICATIONS  --------------------------------------------------------------------------------  Allergies    lactose (Flatulence (Severe); Diarrhea (Severe); Stomach Upset (Severe); Nausea (Severe))  No Known Drug Allergies    Intolerances    OHS (Unknown)    Standing Inpatient Medications  aspirin enteric coated 325 milliGRAM(s) Oral daily  atorvastatin 40 milliGRAM(s) Oral at bedtime  buDESOnide 160 MICROgram(s)/formoterol 4.5 MICROgram(s) Inhaler 2 Puff(s) Inhalation two times a day  chlorhexidine 2% Cloths 1 Application(s) Topical daily  clopidogrel Tablet 75 milliGRAM(s) Oral daily  docusate sodium 100 milliGRAM(s) Oral three times a day  epoetin felix Injectable 69863 Unit(s) IV Push <User Schedule>  heparin  Injectable 5000 Unit(s) SubCutaneous every 8 hours  influenza   Vaccine 0.5 milliLiter(s) IntraMuscular once  insulin glargine Injectable (LANTUS) 20 Unit(s) SubCutaneous at bedtime  insulin lispro (HumaLOG) corrective regimen sliding scale   SubCutaneous Before meals and at bedtime  insulin lispro Injectable (HumaLOG) 6 Unit(s) SubCutaneous three times a day before meals  metoprolol tartrate 25 milliGRAM(s) Oral two times a day  pantoprazole    Tablet 40 milliGRAM(s) Oral daily  polyethylene glycol 3350 17 Gram(s) Oral daily  sodium chloride 0.9% lock flush 3 milliLiter(s) IV Push every 8 hours    PRN Inpatient Medications  ALBUTerol/ipratropium for Nebulization 3 milliLiter(s) Nebulizer every 6 hours PRN  oxyCODONE    5 mG/acetaminophen 325 mG 1 Tablet(s) Oral every 4 hours PRN      REVIEW OF SYSTEMS  --------------------------------------------------------------------------------  Gen: No weight changes, fatigue, fevers/chills, weakness  Skin: No rashes  Head/Eyes/Ears/Mouth: No headache; Normal hearing; Normal vision w/o blurriness; No sinus pain/discomfort, sore throat  Respiratory: No dyspnea, cough, wheezing, hemoptysis  CV: No chest pain, PND, orthopnea  GI: No abdominal pain, diarrhea, constipation, nausea, vomiting, melena, hematochezia  : No increased frequency, dysuria, hematuria, nocturia  MSK: No joint pain/swelling; no back pain; no edema  Neuro: No dizziness/lightheadedness, weakness, seizures, numbness, tingling  Heme: No easy bruising or bleeding  Endo: No heat/cold intolerance  Psych: No significant nervousness, anxiety, stress, depression    All other systems were reviewed and are negative, except as noted.    VITALS/PHYSICAL EXAM  --------------------------------------------------------------------------------  T(C): 37.2 (09-16-19 @ 09:45), Max: 37.3 (09-15-19 @ 19:00)  HR: 74 (09-16-19 @ 12:00) (64 - 82)  BP: 141/63 (09-16-19 @ 12:00) (110/55 - 156/71)  RR: 23 (09-16-19 @ 12:00) (16 - 25)  SpO2: 99% (09-16-19 @ 12:00) (95% - 100%)  Wt(kg): --        09-15-19 @ 07:01  -  09-16-19 @ 07:00  --------------------------------------------------------  IN: 1300 mL / OUT: 790 mL / NET: 510 mL    09-16-19 @ 07:01  -  09-16-19 @ 13:09  --------------------------------------------------------  IN: 200 mL / OUT: 175 mL / NET: 25 mL      Physical Exam:  	Gen: NAD, well-appearing  	HEENT: PERRL, supple neck, clear oropharynx  	Pulm: CTA B/L  	CV: RRR, S1S2; no rub  	Back: No spinal or CVA tenderness; no sacral edema  	Abd: +BS, soft, nontender/nondistended  	: No suprapubic tenderness  	UE: Warm, FROM, no clubbing, intact strength; no edema; no asterixis  	LE: Warm, FROM, no clubbing, intact strength; no edema  	Neuro: No focal deficits, intact gait  	Psych: Normal affect and mood  	Skin: Warm, without rashes  	Vascular access:    LABS/STUDIES  --------------------------------------------------------------------------------              9.2    14.03 >-----------<  142      [09-16-19 @ 04:29]              28.9     136  |  97  |  59.0  ----------------------------<  127      [09-16-19 @ 04:29]  4.6   |  24.0  |  4.75        Ca     7.0     [09-16-19 @ 04:29]      Mg     2.0     [09-16-19 @ 04:29]      Phos  3.7     [09-16-19 @ 04:29]          Uric acid 5.8      [09-16-19 @ 04:29]    Creatinine Trend:  SCr 4.75 [09-16 @ 04:29]  SCr 3.77 [09-15 @ 02:50]  SCr 3.07 [09-14 @ 17:09]  SCr 4.56 [09-14 @ 02:47]  SCr 3.78 [09-13 @ 14:33]    Urinalysis - [08-20-19 @ 13:32]      Color Yellow / Appearance Clear / SG 1.005 / pH 7.0      Gluc Negative / Ketone Negative  / Bili Negative / Urobili Negative       Blood Small / Protein 100 / Leuk Est Negative / Nitrite Negative      RBC 3-5 / WBC 0-2 / Hyaline  / Gran  / Sq Epi  / Non Sq Epi Occasional / Bacteria Occasional      Iron 37, TIBC 204, %sat 18      [08-20-19 @ 15:20]  Ferritin 305      [08-20-19 @ 15:20]  PTH -- (Ca 6.9)      [08-20-19 @ 23:33]   456  Vitamin D (25OH) 11.3      [08-21-19 @ 00:00]  HbA1c 8.4      [08-20-19 @ 13:09]  TSH 1.97      [08-20-19 @ 13:09]  Lipid: chol 209, , HDL 32,       [08-20-19 @ 13:09]    HBsAb <3.0      [08-21-19 @ 22:09]  HBsAg Nonreact      [08-21-19 @ 22:09]  HBcAb Nonreact      [08-21-19 @ 22:09]  HCV 0.12, Nonreact      [08-21-19 @ 17:20]

## 2019-09-16 NOTE — PROGRESS NOTE ADULT - ASSESSMENT
1) ESRD on HD  2) Anemia of renal dx  3) MBD of renal dx  4) Vol HTN    Phosphorus up to 3.7 from 1.4; would still hold phos binder  Restart epo 10k units TIW ;   HD today; saw on machine; tolerating  d/w CTU PA

## 2019-09-16 NOTE — PROGRESS NOTE ADULT - ASSESSMENT
59M, pmhx active smoker, DM (8.4), HTN, HLD, prior CVA, sciatica, non complaint and poor historian, recently admitted to [facility] with acute on chronic CKD, new to HD requiring R chest permacath, NSTEMI cath with MVD, transferred to [facility] for CABG eval, underwent LUE AVF placement 8/26, now admitted electively s/p C3L 9/13, uneventful postop course;

## 2019-09-16 NOTE — PROGRESS NOTE ADULT - PROBLEM SELECTOR PLAN 2
HD today (M/W/F)  cont via R chest permacath, needs additional 3 weeks for AVF to mature per vascular

## 2019-09-16 NOTE — PROGRESS NOTE ADULT - PROBLEM SELECTOR PLAN 1
oob, ambulate, pt as tolerated  PRN percocet  wean NC for spo2 >95, encourage IS, deep breathing/coughing, chest PT  HD stable, cont asa plavix, lipitor for graft patency  titrate lopressor for HR/BP control  tolerating diet, cont bowel regimen  new to HD still makes urine, cont to monitor Is/Os  stable H/H  d/c L chest tube  cont supportive care  transfer to floor pending bed today  d/w team in AM rounds

## 2019-09-16 NOTE — PROGRESS NOTE ADULT - PROBLEM SELECTOR PLAN 7
quit after last admission in august cont to promote smoking cessation quit after last admission in august  cont to promote smoking cessation  cont symbicort and duonebs

## 2019-09-16 NOTE — PROGRESS NOTE ADULT - SUBJECTIVE AND OBJECTIVE BOX
Detail Level: Detailed POD 3 s/p C3L, uneventful postop course    Subjective: no complaints, denies CP, palpitations, SOB, cough, fever, chills, diaphoresis, itchiness/rash, HA, dizziness/lightheadedness, numbness/tingling, blurry/double vision, abd pain, N/V;    T(C): 37.3 (09-16-19 @ 00:05), Max: 37.3 (09-15-19 @ 19:00)  HR: 69 (09-16-19 @ 01:00) (68 - 89)  BP: 120/56 (09-16-19 @ 01:00) (113/56 - 156/71)  ABP: 93/52 (09-15-19 @ 04:00) (93/52 - 95/53)  ABP(mean): 66 (09-15-19 @ 04:00) (66 - 68)  RR: 18 (09-16-19 @ 01:00) (13 - 25)  SpO2: 100% (09-16-19 @ 01:00) (92% - 100%)  CVP(mm Hg): 16 (09-15-19 @ 04:00) (16 - 19)    09-15    138  |  99  |  29.0<H>  ----------------------------<  208<H>  4.8   |  23.0  |  3.77<H>    Ca    7.6<L>      15 Sep 2019 02:50  Mg     2.0     09-15                                 9.6    14.84 )-----------( 136      ( 15 Sep 2019 02:50 )             30.7          CAPILLARY BLOOD GLUCOSE  POCT Blood Glucose.: 170 mg/dL (15 Sep 2019 20:38)  POCT Blood Glucose.: 211 mg/dL (15 Sep 2019 16:30)  POCT Blood Glucose.: 201 mg/dL (15 Sep 2019 11:19)  POCT Blood Glucose.: 185 mg/dL (15 Sep 2019 08:23)    I&O's Detail    14 Sep 2019 07:01  -  15 Sep 2019 07:00  --------------------------------------------------------  IN:    insulin regular Infusion: 56 mL    norepinephrine Infusion: 36 mL    Oral Fluid: 240 mL    Other: 800 mL    sodium chloride 0.9%: 110 mL    sodium chloride 0.9%: 220 mL    Solution: 250 mL    Solution: 50 mL  Total IN: 1762 mL    OUT:    Chest Tube: 75 mL    Chest Tube: 140 mL    Indwelling Catheter - Urethral: 690 mL    Other: 1800 mL  Total OUT: 2705 mL    Total NET: -943 mL    15 Sep 2019 07:01  -  16 Sep 2019 02:58  --------------------------------------------------------  IN:    Oral Fluid: 1250 mL    Solution: 50 mL  Total IN: 1300 mL    OUT:    Chest Tube: 40 mL    Voided: 550 mL  Total OUT: 590 mL    Total NET: 710 mL    Drug Dosing Weight  Height (cm): 165.1 (14 Sep 2019 05:15)  Weight (kg): 89.4 (14 Sep 2019 05:15)  BMI (kg/m2): 32.8 (14 Sep 2019 05:15)  BSA (m2): 1.97 (14 Sep 2019 05:15)    MEDICATIONS  (STANDING):  aspirin enteric coated 325 milliGRAM(s) Oral daily  atorvastatin 40 milliGRAM(s) Oral at bedtime  buDESOnide 160 MICROgram(s)/formoterol 4.5 MICROgram(s) Inhaler 2 Puff(s) Inhalation two times a day  chlorhexidine 2% Cloths 1 Application(s) Topical daily  clopidogrel Tablet 75 milliGRAM(s) Oral daily  docusate sodium 100 milliGRAM(s) Oral three times a day  epoetin felix Injectable 35259 Unit(s) IV Push <User Schedule>  heparin  Injectable 5000 Unit(s) SubCutaneous every 8 hours  influenza   Vaccine 0.5 milliLiter(s) IntraMuscular once  insulin glargine Injectable (LANTUS) 20 Unit(s) SubCutaneous at bedtime  insulin lispro (HumaLOG) corrective regimen sliding scale   SubCutaneous three times a day before meals  insulin lispro Injectable (HumaLOG) 6 Unit(s) SubCutaneous three times a day before meals  lidocaine   Patch 1 Patch Transdermal every 24 hours  metoprolol tartrate 25 milliGRAM(s) Oral two times a day  pantoprazole    Tablet 40 milliGRAM(s) Oral daily  polyethylene glycol 3350 17 Gram(s) Oral daily    MEDICATIONS  (PRN):  ALBUTerol/ipratropium for Nebulization 3 milliLiter(s) Nebulizer every 6 hours PRN Shortness of Breath and/or Wheezing  oxyCODONE    5 mG/acetaminophen 325 mG 1 Tablet(s) Oral every 4 hours PRN Moderate Pain (4 - 6)    Physical Exam  Gen: NAD, WD, WN  Neuro: A&Ox3, speech clear and intact, non focal   HEENT: PERRLA, no erythema or drainage  Neck: no JVD  Pulm: CTA b/l, no wheezing/rales/rhonchi  CV: S1S2 RRR, no murmurs/rubs  Abd: + BS soft NT ND  Extrem/MS: no edema/clubbing/cyanosis, ESTRADA  Vascular 2+ DP and radial pulses b/l, L forarm AVF, no thrill (still maturing)  Incision(s): mid sternal inc C/D/I, sternum stable no click  R chest permacath, site C/D/I Post-Care Instructions: I reviewed with the patient in detail post-care instructions. Patient is to wear sunprotection, and avoid picking at any of the treated lesions. Pt may apply Vaseline to crusted or scabbing areas. Duration Of Freeze Thaw-Cycle (Seconds): 0 Render Post-Care Instructions In Note?: yes Consent: The patient's consent was obtained including but not limited to risks of crusting, scabbing, blistering, scarring, darker or lighter pigmentary change, recurrence, incomplete removal and infection. POD 3 s/p C3L, uneventful postop course    Subjective: no complaints, denies CP, palpitations, SOB, cough, fever, chills, diaphoresis, itchiness/rash, HA, dizziness/lightheadedness, numbness/tingling, blurry/double vision, abd pain, N/V;    T(C): 37.3 (09-16-19 @ 00:05), Max: 37.3 (09-15-19 @ 19:00)  HR: 69 (09-16-19 @ 01:00) (68 - 89)  BP: 120/56 (09-16-19 @ 01:00) (113/56 - 156/71)  ABP: 93/52 (09-15-19 @ 04:00) (93/52 - 95/53)  ABP(mean): 66 (09-15-19 @ 04:00) (66 - 68)  RR: 18 (09-16-19 @ 01:00) (13 - 25)  SpO2: 100% (09-16-19 @ 01:00) (92% - 100%)  CVP(mm Hg): 16 (09-15-19 @ 04:00) (16 - 19)    09-15    138  |  99  |  29.0<H>  ----------------------------<  208<H>  4.8   |  23.0  |  3.77<H>    Ca    7.6<L>      15 Sep 2019 02:50  Mg     2.0     09-15                                 9.6    14.84 )-----------( 136      ( 15 Sep 2019 02:50 )             30.7          CAPILLARY BLOOD GLUCOSE  POCT Blood Glucose.: 170 mg/dL (15 Sep 2019 20:38)  POCT Blood Glucose.: 211 mg/dL (15 Sep 2019 16:30)  POCT Blood Glucose.: 201 mg/dL (15 Sep 2019 11:19)  POCT Blood Glucose.: 185 mg/dL (15 Sep 2019 08:23)    I&O's Detail    14 Sep 2019 07:01  -  15 Sep 2019 07:00  --------------------------------------------------------  IN:    insulin regular Infusion: 56 mL    norepinephrine Infusion: 36 mL    Oral Fluid: 240 mL    Other: 800 mL    sodium chloride 0.9%: 110 mL    sodium chloride 0.9%: 220 mL    Solution: 250 mL    Solution: 50 mL  Total IN: 1762 mL    OUT:    Chest Tube: 75 mL    Chest Tube: 140 mL    Indwelling Catheter - Urethral: 690 mL    Other: 1800 mL  Total OUT: 2705 mL    Total NET: -943 mL    15 Sep 2019 07:01  -  16 Sep 2019 02:58  --------------------------------------------------------  IN:    Oral Fluid: 1250 mL    Solution: 50 mL  Total IN: 1300 mL    OUT:    Chest Tube: 40 mL    Voided: 550 mL  Total OUT: 590 mL    Total NET: 710 mL    Drug Dosing Weight  Height (cm): 165.1 (14 Sep 2019 05:15)  Weight (kg): 89.4 (14 Sep 2019 05:15)  BMI (kg/m2): 32.8 (14 Sep 2019 05:15)  BSA (m2): 1.97 (14 Sep 2019 05:15)    MEDICATIONS  (STANDING):  aspirin enteric coated 325 milliGRAM(s) Oral daily  atorvastatin 40 milliGRAM(s) Oral at bedtime  buDESOnide 160 MICROgram(s)/formoterol 4.5 MICROgram(s) Inhaler 2 Puff(s) Inhalation two times a day  chlorhexidine 2% Cloths 1 Application(s) Topical daily  clopidogrel Tablet 75 milliGRAM(s) Oral daily  docusate sodium 100 milliGRAM(s) Oral three times a day  epoetin felix Injectable 64004 Unit(s) IV Push <User Schedule>  heparin  Injectable 5000 Unit(s) SubCutaneous every 8 hours  influenza   Vaccine 0.5 milliLiter(s) IntraMuscular once  insulin glargine Injectable (LANTUS) 20 Unit(s) SubCutaneous at bedtime  insulin lispro (HumaLOG) corrective regimen sliding scale   SubCutaneous three times a day before meals  insulin lispro Injectable (HumaLOG) 6 Unit(s) SubCutaneous three times a day before meals  lidocaine   Patch 1 Patch Transdermal every 24 hours  metoprolol tartrate 25 milliGRAM(s) Oral two times a day  pantoprazole    Tablet 40 milliGRAM(s) Oral daily  polyethylene glycol 3350 17 Gram(s) Oral daily    MEDICATIONS  (PRN):  ALBUTerol/ipratropium for Nebulization 3 milliLiter(s) Nebulizer every 6 hours PRN Shortness of Breath and/or Wheezing  oxyCODONE    5 mG/acetaminophen 325 mG 1 Tablet(s) Oral every 4 hours PRN Moderate Pain (4 - 6)    Physical Exam  Gen: NAD, WD, WN  Neuro: A&Ox3, speech clear and intact, non focal   HEENT: PERRLA, no erythema or drainage  Neck: no JVD  Pulm: CTA b/l, no wheezing/rales/rhonchi  CV: S1S2 RRR, no murmurs/rubs  Abd: + BS soft NT ND  Extrem/MS: no edema/clubbing/cyanosis, ESTRADA, b/l ACE wraps in tact   Vascular 2+ DP and radial pulses b/l, L forarm AVF, no thrill (still maturing)  Incision(s): mid sternal provena C/D/I, sternum stable no click  R chest permacath, site C/D/I

## 2019-09-17 LAB
ANION GAP SERPL CALC-SCNC: 13 MMOL/L — SIGNIFICANT CHANGE UP (ref 5–17)
BUN SERPL-MCNC: 43 MG/DL — HIGH (ref 8–20)
CALCIUM SERPL-MCNC: 7.6 MG/DL — LOW (ref 8.6–10.2)
CHLORIDE SERPL-SCNC: 100 MMOL/L — SIGNIFICANT CHANGE UP (ref 98–107)
CO2 SERPL-SCNC: 25 MMOL/L — SIGNIFICANT CHANGE UP (ref 22–29)
CREAT SERPL-MCNC: 3.77 MG/DL — HIGH (ref 0.5–1.3)
GLUCOSE BLDC GLUCOMTR-MCNC: 111 MG/DL — HIGH (ref 70–99)
GLUCOSE BLDC GLUCOMTR-MCNC: 121 MG/DL — HIGH (ref 70–99)
GLUCOSE BLDC GLUCOMTR-MCNC: 123 MG/DL — HIGH (ref 70–99)
GLUCOSE BLDC GLUCOMTR-MCNC: 124 MG/DL — HIGH (ref 70–99)
GLUCOSE BLDC GLUCOMTR-MCNC: 48 MG/DL — LOW (ref 70–99)
GLUCOSE BLDC GLUCOMTR-MCNC: 67 MG/DL — LOW (ref 70–99)
GLUCOSE BLDC GLUCOMTR-MCNC: 71 MG/DL — SIGNIFICANT CHANGE UP (ref 70–99)
GLUCOSE BLDC GLUCOMTR-MCNC: 73 MG/DL — SIGNIFICANT CHANGE UP (ref 70–99)
GLUCOSE BLDC GLUCOMTR-MCNC: 83 MG/DL — SIGNIFICANT CHANGE UP (ref 70–99)
GLUCOSE SERPL-MCNC: 89 MG/DL — SIGNIFICANT CHANGE UP (ref 70–115)
HCT VFR BLD CALC: 33 % — LOW (ref 39–50)
HGB BLD-MCNC: 10.3 G/DL — LOW (ref 13–17)
MAGNESIUM SERPL-MCNC: 2 MG/DL — SIGNIFICANT CHANGE UP (ref 1.6–2.6)
MCHC RBC-ENTMCNC: 27.2 PG — SIGNIFICANT CHANGE UP (ref 27–34)
MCHC RBC-ENTMCNC: 31.2 GM/DL — LOW (ref 32–36)
MCV RBC AUTO: 87.1 FL — SIGNIFICANT CHANGE UP (ref 80–100)
PLATELET # BLD AUTO: 163 K/UL — SIGNIFICANT CHANGE UP (ref 150–400)
POTASSIUM SERPL-MCNC: 4.5 MMOL/L — SIGNIFICANT CHANGE UP (ref 3.5–5.3)
POTASSIUM SERPL-SCNC: 4.5 MMOL/L — SIGNIFICANT CHANGE UP (ref 3.5–5.3)
RBC # BLD: 3.79 M/UL — LOW (ref 4.2–5.8)
RBC # FLD: 16 % — HIGH (ref 10.3–14.5)
SODIUM SERPL-SCNC: 138 MMOL/L — SIGNIFICANT CHANGE UP (ref 135–145)
WBC # BLD: 10.68 K/UL — HIGH (ref 3.8–10.5)
WBC # FLD AUTO: 10.68 K/UL — HIGH (ref 3.8–10.5)

## 2019-09-17 PROCEDURE — 71045 X-RAY EXAM CHEST 1 VIEW: CPT | Mod: 26

## 2019-09-17 PROCEDURE — 99233 SBSQ HOSP IP/OBS HIGH 50: CPT

## 2019-09-17 PROCEDURE — 99222 1ST HOSP IP/OBS MODERATE 55: CPT

## 2019-09-17 RX ORDER — METOPROLOL TARTRATE 50 MG
25 TABLET ORAL THREE TIMES A DAY
Refills: 0 | Status: DISCONTINUED | OUTPATIENT
Start: 2019-09-17 | End: 2019-09-18

## 2019-09-17 RX ORDER — INSULIN LISPRO 100/ML
5 VIAL (ML) SUBCUTANEOUS
Refills: 0 | Status: DISCONTINUED | OUTPATIENT
Start: 2019-09-17 | End: 2019-09-18

## 2019-09-17 RX ORDER — INSULIN GLARGINE 100 [IU]/ML
16 INJECTION, SOLUTION SUBCUTANEOUS AT BEDTIME
Refills: 0 | Status: DISCONTINUED | OUTPATIENT
Start: 2019-09-17 | End: 2019-09-18

## 2019-09-17 RX ADMIN — Medication 325 MILLIGRAM(S): at 12:26

## 2019-09-17 RX ADMIN — PANTOPRAZOLE SODIUM 40 MILLIGRAM(S): 20 TABLET, DELAYED RELEASE ORAL at 12:26

## 2019-09-17 RX ADMIN — INSULIN GLARGINE 16 UNIT(S): 100 INJECTION, SOLUTION SUBCUTANEOUS at 23:36

## 2019-09-17 RX ADMIN — ATORVASTATIN CALCIUM 40 MILLIGRAM(S): 80 TABLET, FILM COATED ORAL at 22:32

## 2019-09-17 RX ADMIN — HEPARIN SODIUM 5000 UNIT(S): 5000 INJECTION INTRAVENOUS; SUBCUTANEOUS at 14:37

## 2019-09-17 RX ADMIN — Medication 25 MILLIGRAM(S): at 22:32

## 2019-09-17 RX ADMIN — Medication 6 UNIT(S): at 12:27

## 2019-09-17 RX ADMIN — HEPARIN SODIUM 5000 UNIT(S): 5000 INJECTION INTRAVENOUS; SUBCUTANEOUS at 06:18

## 2019-09-17 RX ADMIN — SODIUM CHLORIDE 3 MILLILITER(S): 9 INJECTION INTRAMUSCULAR; INTRAVENOUS; SUBCUTANEOUS at 22:33

## 2019-09-17 RX ADMIN — CLOPIDOGREL BISULFATE 75 MILLIGRAM(S): 75 TABLET, FILM COATED ORAL at 12:26

## 2019-09-17 RX ADMIN — BUDESONIDE AND FORMOTEROL FUMARATE DIHYDRATE 2 PUFF(S): 160; 4.5 AEROSOL RESPIRATORY (INHALATION) at 20:51

## 2019-09-17 RX ADMIN — CHLORHEXIDINE GLUCONATE 1 APPLICATION(S): 213 SOLUTION TOPICAL at 12:27

## 2019-09-17 RX ADMIN — SODIUM CHLORIDE 3 MILLILITER(S): 9 INJECTION INTRAMUSCULAR; INTRAVENOUS; SUBCUTANEOUS at 13:05

## 2019-09-17 RX ADMIN — Medication 25 MILLIGRAM(S): at 06:18

## 2019-09-17 RX ADMIN — BUDESONIDE AND FORMOTEROL FUMARATE DIHYDRATE 2 PUFF(S): 160; 4.5 AEROSOL RESPIRATORY (INHALATION) at 09:04

## 2019-09-17 RX ADMIN — HEPARIN SODIUM 5000 UNIT(S): 5000 INJECTION INTRAVENOUS; SUBCUTANEOUS at 22:32

## 2019-09-17 RX ADMIN — Medication 25 MILLIGRAM(S): at 14:37

## 2019-09-17 RX ADMIN — SODIUM CHLORIDE 3 MILLILITER(S): 9 INJECTION INTRAMUSCULAR; INTRAVENOUS; SUBCUTANEOUS at 06:13

## 2019-09-17 RX ADMIN — Medication 6 UNIT(S): at 09:53

## 2019-09-17 NOTE — PROGRESS NOTE ADULT - PROBLEM SELECTOR PLAN 7
Quit after last admission in august.  Cont to promote smoking cessation.  Cont symbicort daily and duonebs PRN.

## 2019-09-17 NOTE — PROGRESS NOTE ADULT - ASSESSMENT
60yo Male with a PMHx DM (HA1c 8.4), HTN, HLD, prior CVA, sciatica, and former smoker, recently admitted to [facility] [place] [facility] with acute on chronic CKD, new to HD requiring R chest permacath, and also presenting with NSTEMI with cardiac cath showing MVD. Patient was transferred to [facility] for CABG evalulation.  Patient underwent LUE AVF placement 8/26. Patient was admitted to [facility] electively on 9/13 for same day CABG. Patient underwent C3L 9/13, with an uneventful postoperative course. Chest tube was removed 9/15. Patient admitted to right knee pain, with negative right knee Xray and uric acid of 5.8 on 9/16. Patient is non complaint with medications and a poor historian. 60yo Male with a PMHx DM (HA1c 8.4), HTN, HLD, prior CVA, sciatica, and former smoker, recently admitted to [facility] with acute on chronic CKD, new to HD requiring R chest permacath, and also presenting with NSTEMI with cardiac cath showing MVD. Patient was transferred to [facility] for CABG evalulation.  Patient underwent LUE AVF placement 8/26. Patient was admitted to [facility] electively on 9/13 for same day CABG. Patient underwent C3L on 9/13. Chest tube was removed 9/15. Patient admitted to right knee pain, with negative right knee Xray and uric acid of 5.8 on 9/16. Patient was non complaint with medications as an outpatient and a poor historian.

## 2019-09-17 NOTE — CONSULT NOTE ADULT - ASSESSMENT
59 year old male with Type 2 DM with associated ESRD now on HD, HTN, recent NSTEMI and multivessel CAD s/p 3V CABG.  His glycemic control is excellent on basal bolus regimen at this time.      1.  Type 2 DM-  would start weaning insulin.  Reduce Lantus to 15 units qhs.  On discharge would D/C prandial insulin and resume Tradjenta 5 mg daily.  Depending on glycemic control, may be able wean off basal insulin, however if patient is agreeable could use combination of Lantus and Tradjenta.  Patient should follow up in office in 2 weeks.    2.  CAD-  s/p CABG.  Management as per CT surgery team.  On ASA, Plavix, statin and metoprolol.  3.  Hyperlipidemia-  continue Atorvastatin  4.  ESRD-  on HD, renal following.

## 2019-09-17 NOTE — PROGRESS NOTE ADULT - PROBLEM SELECTOR PLAN 2
HD performed yesterday with -1 liter (M/W/F).   Cont HD via R chest permacath, needs additional 3 weeks for AVF to mature per vascular.  Has placement at [place] [facility] upon discharge.  Cont epogen as per Nephro.  Nephro consult appreciated. HD performed yesterday with -1 liter (M/W/F).   Cont HD via R chest permacath, needs additional 2-3 weeks for LUE AVF to mature per vascular.   Vascular consult appreciated.   Has placement at [place] upon discharge.  Cont epogen as per Nephro.  Nephro consult appreciated.

## 2019-09-17 NOTE — PROGRESS NOTE ADULT - PROBLEM SELECTOR PLAN 1
S/p C3L on 9/13.  Chest tube removed 9/15.  Left groin stitch to be removed 9/18.  Encourage OOB, ambulate as tolerated with assistance.  PT following.  Encourage CDBE, I/S, coughing, and chest PT as needed.  Pain control with PRN percocet.  Tolerating diet.   Continue bowel regimen.  Hemodynamically stable. Stable H/H.  Cont ASA, plavix, lipitor for graft patency.  Cont epogen as per Nephro.  Cont lopressor for HR/BP control.  New to HD, scheduled MWF, still makes urine.   Cont to monitor Is/Os. S/p C3L on 9/13.  Chest tube removed 9/15.  Left groin stitch to be removed 9/18.  Encourage OOB, ambulate as tolerated with assistance.  PT following.  Encourage CDBE, I/S, coughing, and chest PT as needed.  Pain control with PRN percocet.  Tolerating diet.   Continue bowel regimen.  Hemodynamically stable. Stable H/H.  Cont ASA, plavix, lipitor for graft patency.  Cont epogen as per Nephro.  Increase lopressor to 25mg TID for HR/BP control.  New to HD, scheduled MWF, still makes urine.   Cont to monitor Is/Os.

## 2019-09-17 NOTE — PROGRESS NOTE ADULT - PROBLEM SELECTOR PLAN 4
Titrate lopressor as HR/BP tolerates. Titrate lopressor as HR/BP tolerates.   Increased to lopressor 25mg TID today.   Will continue to monitor.

## 2019-09-17 NOTE — PROGRESS NOTE ADULT - SUBJECTIVE AND OBJECTIVE BOX
POD #4 s/p C3L    Subjective:    VITAL SIGNS  Vital Signs Last 24 Hrs  T(C): 37 (19 @ 06:22), Max: 37.2 (19 @ 09:45)  T(F): 98.6 (19 @ 06:22), Max: 99 (19 @ 09:45)  HR: 75 (19 @ 06:22) (64 - 87)  BP: 149/81 (19 @ 06:22) (121/57 - 160/80)  RR: 18 (19 @ 06:22) (18 - 28)  SpO2: 96% (19 @ 06:22) (86% - 99%)  on (O2)              Telemetry/Alarms: Overnight Sinus rhythm with rate controlled between 70-80bpm, few PVCs  LVEF: 40% on TTE 2019 pre-op, 55% during surgery    MEDICATIONS  ALBUTerol/ipratropium for Nebulization 3 milliLiter(s) Nebulizer every 6 hours PRN  aspirin enteric coated 325 milliGRAM(s) Oral daily  atorvastatin 40 milliGRAM(s) Oral at bedtime  buDESOnide 160 MICROgram(s)/formoterol 4.5 MICROgram(s) Inhaler 2 Puff(s) Inhalation two times a day  chlorhexidine 2% Cloths 1 Application(s) Topical daily  clopidogrel Tablet 75 milliGRAM(s) Oral daily  docusate sodium 100 milliGRAM(s) Oral three times a day  epoetin felix Injectable 92048 Unit(s) IV Push <User Schedule>  heparin  Injectable 5000 Unit(s) SubCutaneous every 8 hours  influenza   Vaccine 0.5 milliLiter(s) IntraMuscular once  insulin glargine Injectable (LANTUS) 20 Unit(s) SubCutaneous at bedtime  insulin lispro (HumaLOG) corrective regimen sliding scale   SubCutaneous Before meals and at bedtime  insulin lispro Injectable (HumaLOG) 6 Unit(s) SubCutaneous three times a day before meals  metoprolol tartrate 25 milliGRAM(s) Oral two times a day  oxyCODONE    5 mG/acetaminophen 325 mG 1 Tablet(s) Oral every 4 hours PRN  oxyCODONE    5 mG/acetaminophen 325 mG 2 Tablet(s) Oral every 6 hours PRN  pantoprazole    Tablet 40 milliGRAM(s) Oral daily  polyethylene glycol 3350 17 Gram(s) Oral daily  sodium chloride 0.9% lock flush 3 milliLiter(s) IV Push every 8 hours      PHYSICAL EXAM  General: well nourished, well developed, no acute distress  Neurology: alert and oriented x 3, nonfocal, no gross deficits  Respiratory: clear to auscultation bilaterally, no wheezing/rhonchi/rales noted  CV: regular rate and rhythm, normal S1, S2  Abdomen: soft, nontender, nondistended, positive bowel sounds, last bowel movement   Extremities: warm, well perfused. no edema. ESTRADA. + DP pulses. ACE wrap intact.  Vascular: +2 DP and radial pulses b/l, Left forearm AVF, no thrill (still maturing)  Incisions: midline sternal incision, + provena, c/d/i. sternum stable. No click.   Right chest permacath site c/d/i, no drainage, no erythema/warmth/swelling       @ 07:01  -   @ 07:00  --------------------------------------------------------  IN: 1790 mL / OUT: 2275 mL / NET: -485 mL        Weights:  Daily     Daily Weight in k.8 (17 Sep 2019 06:21)  Admit Wt: Drug Dosing Weight  Height (cm): 165.1 (14 Sep 2019 05:15)  Weight (kg): 89.4 (14 Sep 2019 05:15)  BMI (kg/m2): 32.8 (14 Sep 2019 05:15)  BSA (m2): 1.97 (14 Sep 2019 05:15)    All laboratory results, radiology and medications reviewed.    LABS      138  |  100  |  43.0<H>  ----------------------------<  89  4.5   |  25.0  |  3.77<H>    Ca    7.6<L>      17 Sep 2019 06:09  Phos  3.7     09-16  Mg     2.0                                        10.3   10.68 )-----------( 163      ( 17 Sep 2019 06:09 )             33.0              CAPILLARY BLOOD GLUCOSE      POCT Blood Glucose.: 83 mg/dL (17 Sep 2019 08:07)  POCT Blood Glucose.: 109 mg/dL (16 Sep 2019 17:41)  POCT Blood Glucose.: 88 mg/dL (16 Sep 2019 15:32)  POCT Blood Glucose.: 113 mg/dL (16 Sep 2019 11:14)           Today's CXR:    Today's EKG:    PAST MEDICAL & SURGICAL HISTORY:  HLD (hyperlipidemia)  Cerebrovascular accident (CVA): Poor historian, unaware of date  DM2 (diabetes mellitus, type 2)  HTN (hypertension)  No significant past surgical history POD #4 s/p C3L    Subjective: "I'm feeling OK, just trying to eat breakfast." States pain is controlled this am. Sometimes pain goes up to a 5/10 incisional pain, but right now pain is OK. Passed a BM yesterday. Tolerating diet. Patient states he tolerated HD well yesterday. No fevers, lightheadedness, CP, palpitations, SOB, cough, abdominal pain, darker or bloody stool noted.     VITAL SIGNS  Vital Signs Last 24 Hrs  T(C): 37 (19 @ 06:22), Max: 37.2 (19 @ 09:45)  T(F): 98.6 (19 @ 06:22), Max: 99 (19 @ 09:45)  HR: 75 (19 @ 06:22) (64 - 87)  BP: 149/81 (19 @ 06:22) (121/57 - 160/80)  RR: 18 (19 @ 06:22) (18 - 28)  SpO2: 96% (19 @ 06:22) (86% - 99%)  on (O2)              Telemetry/Alarms: Overnight Sinus rhythm with rate controlled between 70-80bpm, few PVCs  LVEF: 40% on TTE 2019 pre-op, 55% during surgery    MEDICATIONS  ALBUTerol/ipratropium for Nebulization 3 milliLiter(s) Nebulizer every 6 hours PRN  aspirin enteric coated 325 milliGRAM(s) Oral daily  atorvastatin 40 milliGRAM(s) Oral at bedtime  buDESOnide 160 MICROgram(s)/formoterol 4.5 MICROgram(s) Inhaler 2 Puff(s) Inhalation two times a day  chlorhexidine 2% Cloths 1 Application(s) Topical daily  clopidogrel Tablet 75 milliGRAM(s) Oral daily  docusate sodium 100 milliGRAM(s) Oral three times a day  epoetin felix Injectable 07140 Unit(s) IV Push <User Schedule>  heparin  Injectable 5000 Unit(s) SubCutaneous every 8 hours  influenza   Vaccine 0.5 milliLiter(s) IntraMuscular once  insulin glargine Injectable (LANTUS) 20 Unit(s) SubCutaneous at bedtime  insulin lispro (HumaLOG) corrective regimen sliding scale   SubCutaneous Before meals and at bedtime  insulin lispro Injectable (HumaLOG) 6 Unit(s) SubCutaneous three times a day before meals  metoprolol tartrate 25 milliGRAM(s) Oral two times a day  oxyCODONE    5 mG/acetaminophen 325 mG 1 Tablet(s) Oral every 4 hours PRN  oxyCODONE    5 mG/acetaminophen 325 mG 2 Tablet(s) Oral every 6 hours PRN  pantoprazole    Tablet 40 milliGRAM(s) Oral daily  polyethylene glycol 3350 17 Gram(s) Oral daily  sodium chloride 0.9% lock flush 3 milliLiter(s) IV Push every 8 hours      PHYSICAL EXAM  General: well nourished, well developed, no acute distress  Neurology: alert and oriented x 3, nonfocal, no gross deficits  Respiratory: clear to auscultation bilaterally, no wheezing/rhonchi/rales noted  CV: regular rate and rhythm, normal S1, S2  Abdomen: soft, nontender, nondistended, positive bowel sounds, last bowel movement yesterday , normal in consistence and color as per patient  Extremities: warm, well perfused. no edema. ESTRADA. + DP pulses. Bandage over LLE and RLE incision sites c/d/i  Vascular: +2 DP and radial pulses b/l, Left forearm AVF, +thrill (still maturing)  Incisions: midline sternal incision, + provena, c/d/i. sternum stable. No click.   Right chest permacath site c/d/i, no drainage, no surrounding erythema/warmth/swelling       @ 07:01  -   @ 07:00  --------------------------------------------------------  IN: 1790 mL / OUT: 2275 mL / NET: -485 mL        Weights:  Daily     Daily Weight in k.8 (17 Sep 2019 06:21)  Admit Wt: Drug Dosing Weight  Height (cm): 165.1 (14 Sep 2019 05:15)  Weight (kg): 89.4 (14 Sep 2019 05:15)  BMI (kg/m2): 32.8 (14 Sep 2019 05:15)  BSA (m2): 1.97 (14 Sep 2019 05:15)    All laboratory results, radiology and medications reviewed.    LABS      138  |  100  |  43.0<H>  ----------------------------<  89  4.5   |  25.0  |  3.77<H>    Ca    7.6<L>      17 Sep 2019 06:09  Phos  3.7     09-16  Mg     2.0                                        10.3   10.68 )-----------( 163      ( 17 Sep 2019 06:09 )             33.0              CAPILLARY BLOOD GLUCOSE    POCT Blood Glucose.: 83 mg/dL (17 Sep 2019 08:07)  POCT Blood Glucose.: 109 mg/dL (16 Sep 2019 17:41)  POCT Blood Glucose.: 88 mg/dL (16 Sep 2019 15:32)  POCT Blood Glucose.: 113 mg/dL (16 Sep 2019 11:14)           Today's CXR: from: Xray Chest 1 View- PORTABLE-Routine (19 @ 05:08)     FINDINGS:   Right-sided dialysis catheter in the distal SVC right atrial junction.  HEART:  Enlarged  LUNGS: Left basilar atelectasis and small effusion. Mild congestion.    OSSEOUS STRUCTURES:: degenerative changes. Sternal wires are seen.    IMPRESSION:     Cardiomegaly.    Left basilar atelectasis and small effusion. Mild congestion.        PAST MEDICAL & SURGICAL HISTORY:  HLD (hyperlipidemia)  Cerebrovascular accident (CVA): Poor historian, unaware of date  DM2 (diabetes mellitus, type 2)  HTN (hypertension)  No significant past surgical history

## 2019-09-17 NOTE — PROGRESS NOTE ADULT - SUBJECTIVE AND OBJECTIVE BOX
POD #4 s/p C3L    Subjective: "I'm feeling OK, just trying to eat breakfast." States pain is controlled this am. Sometimes pain goes up to a 5/10 incisional pain, but right now pain is OK. Passed a BM yesterday. Tolerating diet. Patient states he tolerated HD well yesterday. No fevers, lightheadedness, CP, palpitations, SOB, cough, abdominal pain, darker or bloody stool noted.     VITAL SIGNS  Vital Signs Last 24 Hrs  T(C): 37 (19 @ 06:22), Max: 37.2 (19 @ 09:45)  T(F): 98.6 (19 @ 06:22), Max: 99 (19 @ 09:45)  HR: 75 (19 @ 06:22) (64 - 87)  BP: 149/81 (19 @ 06:22) (121/57 - 160/80)  RR: 18 (19 @ 06:22) (18 - 28)  SpO2: 96% (19 @ 06:22) (86% - 99%)  on (O2)              Telemetry/Alarms: Overnight Sinus rhythm with rate controlled between 70-80bpm, few PVCs  LVEF: 40% on TTE 2019 pre-op, 55% during surgery    MEDICATIONS  ALBUTerol/ipratropium for Nebulization 3 milliLiter(s) Nebulizer every 6 hours PRN  aspirin enteric coated 325 milliGRAM(s) Oral daily  atorvastatin 40 milliGRAM(s) Oral at bedtime  buDESOnide 160 MICROgram(s)/formoterol 4.5 MICROgram(s) Inhaler 2 Puff(s) Inhalation two times a day  chlorhexidine 2% Cloths 1 Application(s) Topical daily  clopidogrel Tablet 75 milliGRAM(s) Oral daily  docusate sodium 100 milliGRAM(s) Oral three times a day  epoetin felix Injectable 56008 Unit(s) IV Push <User Schedule>  heparin  Injectable 5000 Unit(s) SubCutaneous every 8 hours  influenza   Vaccine 0.5 milliLiter(s) IntraMuscular once  insulin glargine Injectable (LANTUS) 20 Unit(s) SubCutaneous at bedtime  insulin lispro (HumaLOG) corrective regimen sliding scale   SubCutaneous Before meals and at bedtime  insulin lispro Injectable (HumaLOG) 6 Unit(s) SubCutaneous three times a day before meals  metoprolol tartrate 25 milliGRAM(s) Oral two times a day  oxyCODONE    5 mG/acetaminophen 325 mG 1 Tablet(s) Oral every 4 hours PRN  oxyCODONE    5 mG/acetaminophen 325 mG 2 Tablet(s) Oral every 6 hours PRN  pantoprazole    Tablet 40 milliGRAM(s) Oral daily  polyethylene glycol 3350 17 Gram(s) Oral daily  sodium chloride 0.9% lock flush 3 milliLiter(s) IV Push every 8 hours    PHYSICAL EXAM  General: well nourished, well developed, no acute distress  Neurology: alert and oriented x 3, nonfocal, no gross deficits  Respiratory: clear to auscultation bilaterally, no wheezing/rhonchi/rales noted  CV: regular rate and rhythm, normal S1, S2  Abdomen: soft, nontender, nondistended, positive bowel sounds, last bowel movement yesterday , normal in consistence and color as per patient  Extremities: warm, well perfused. no edema. ESTRADA. + DP pulses. Bandage over LLE and RLE incision sites c/d/i  Vascular: +2 DP and radial pulses b/l, Left forearm AVF, +thrill (still maturing)  Incisions: midline sternal incision, + provena, c/d/i. sternum stable. No click.   Right chest permacath site c/d/i, no drainage, no surrounding erythema/warmth/swelling     @ 07:01  -   @ 07:00  --------------------------------------------------------  IN: 1790 mL / OUT: 2275 mL / NET: -485 mL    Weights:  Daily     Daily Weight in k.8 (17 Sep 2019 06:21)  Admit Wt: Drug Dosing Weight  Height (cm): 165.1 (14 Sep 2019 05:15)  Weight (kg): 89.4 (14 Sep 2019 05:15)  BMI (kg/m2): 32.8 (14 Sep 2019 05:15)  BSA (m2): 1.97 (14 Sep 2019 05:15)    All laboratory results, radiology and medications reviewed.    LABS      138  |  100  |  43.0<H>  ----------------------------<  89  4.5   |  25.0  |  3.77<H>    Ca    7.6<L>      17 Sep 2019 06:09  Phos  3.7     09-  Mg     2.0                             10.3   10.68 )-----------( 163      ( 17 Sep 2019 06:09 )             33.0          CAPILLARY BLOOD GLUCOSE    POCT Blood Glucose.: 83 mg/dL (17 Sep 2019 08:07)  POCT Blood Glucose.: 109 mg/dL (16 Sep 2019 17:41)  POCT Blood Glucose.: 88 mg/dL (16 Sep 2019 15:32)  POCT Blood Glucose.: 113 mg/dL (16 Sep 2019 11:14)           Today's CXR: from: Xray Chest 1 View- PORTABLE-Routine (19 @ 05:08)     FINDINGS:   Right-sided dialysis catheter in the distal SVC right atrial junction.  HEART:  Enlarged  LUNGS: Left basilar atelectasis and small effusion. Mild congestion.    OSSEOUS STRUCTURES:: degenerative changes. Sternal wires are seen.    IMPRESSION:     Cardiomegaly.    Left basilar atelectasis and small effusion. Mild congestion.    PAST MEDICAL & SURGICAL HISTORY:  HLD (hyperlipidemia)  Cerebrovascular accident (CVA): Poor historian, unaware of date  DM2 (diabetes mellitus, type 2)  HTN (hypertension)  No significant past surgical history     58yo Male with a PMHx DM (HA1c 8.4), HTN, HLD, prior CVA, sciatica, and former smoker, [place] [facility] [place], new to HD requiring R chest permacath, and also presenting with NSTEMI with cardiac cath showing MVD. Patient was transferred to [facility] for CABG evalulation.  Patient underwent LUE AVF placement . Patient was admitted to [facility] electively on  for same day CABG. Patient underwent C3L on . Chest tube was removed 9/15. Patient admitted to right knee pain, with negative right knee Xray and uric acid of 5.8 on . Patient was non complaint with medications as an outpatient and a poor historian.       Problem/Plan - 1:    ·  Problem: CAD (coronary artery disease).  Plan: S/p C3L on .  Chest tube removed 9/15.  Left groin stitch to be removed .  Encourage OOB, ambulate as tolerated with assistance.  PT following.  Encourage CDBE, I/S, coughing, and chest PT as needed.  Pain control with PRN percocet.  Tolerating diet.   Continue bowel regimen.  Hemodynamically stable. Stable H/H.  Cont ASA, plavix, lipitor for graft patency.  Cont epogen as per Nephro.  Increase lopressor to 25mg TID for HR/BP control.  New to HD, scheduled MWF, still makes urine.   Cont to monitor Is/Os.     Problem/Plan - 2:  ·  Problem: ESRD (end stage renal disease) on dialysis.  Plan: HD performed yesterday with -1 liter (M/W/F).   Cont HD via R chest permacath, needs additional 2-3 weeks for LUE AVF to mature per vascular.   Vascular consult appreciated.   Has placement at [facility] upon discharge.  Cont epogen as per Nephro.  Nephro consult appreciated.     Problem/Plan - 3:  ·  Problem: Cerebrovascular accident (CVA).  Plan: No residual deficits.  Cont asa/plavix.     Problem/Plan - 4:  ·  Problem: HTN (hypertension).  Plan: Titrate lopressor as HR/BP tolerates.   Increased to lopressor 25mg TID today.   Will continue to monitor.   HD in AM,

## 2019-09-17 NOTE — CONSULT NOTE ADULT - SUBJECTIVE AND OBJECTIVE BOX
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  Patient had AV fistula placed and now returns for CABG.  He had a 3 vessel CABG on 9/13 for multivessel CAD.  He currently feels well after surgery without CP, SOB, N/V.   We are now consulted for assistance with glycemic control.  Blood glucose is currently well controlled with basal bolus insulin regimen.     He was diagnosed with type 2 DM many years ago.  His DM is moderately uncontrolled with A1c of 8.4%.  After last admission he was sent home with Rx for Tradjenta 5 mg daily with improved control.  He does have associated nephropathy and neuropathy.      PAST MEDICAL & SURGICAL HISTORY:  HLD (hyperlipidemia)  Cerebrovascular accident (CVA): Poor historian, unaware of date  DM2 (diabetes mellitus, type 2)  HTN (hypertension)  No significant past surgical history    Allergies  lactose (Flatulence (Severe); Diarrhea (Severe); Stomach Upset (Severe); Nausea (Severe))  No Known Drug Allergies    MEDICATIONS  (STANDING):  aspirin enteric coated 325 milliGRAM(s) Oral daily  atorvastatin 40 milliGRAM(s) Oral at bedtime  buDESOnide 160 MICROgram(s)/formoterol 4.5 MICROgram(s) Inhaler 2 Puff(s) Inhalation two times a day  chlorhexidine 2% Cloths 1 Application(s) Topical daily  clopidogrel Tablet 75 milliGRAM(s) Oral daily  docusate sodium 100 milliGRAM(s) Oral three times a day  epoetin felix Injectable 25749 Unit(s) IV Push <User Schedule>  heparin  Injectable 5000 Unit(s) SubCutaneous every 8 hours  insulin glargine Injectable (LANTUS) 20 Unit(s) SubCutaneous at bedtime  insulin lispro (HumaLOG) corrective regimen sliding scale   SubCutaneous Before meals and at bedtime  insulin lispro Injectable (HumaLOG) 6 Unit(s) SubCutaneous three times a day before meals  metoprolol tartrate 25 milliGRAM(s) Oral three times a day  pantoprazole    Tablet 40 milliGRAM(s) Oral daily  polyethylene glycol 3350 17 Gram(s) Oral daily  sodium chloride 0.9% lock flush 3 milliLiter(s) IV Push every 8 hours    SH:  hx of smoking, unemployed    FH: father and sister with DM    ROS:  as per HPI, otherwise 10 point ROS negative.              Vital Signs Last 24 Hrs  T(C): 36.9 (17 Sep 2019 15:41), Max: 37 (16 Sep 2019 21:49)  T(F): 98.5 (17 Sep 2019 15:41), Max: 98.6 (16 Sep 2019 21:49)  HR: 79 (17 Sep 2019 15:41) (69 - 91)  BP: 148/72 (17 Sep 2019 15:41) (100/59 - 160/80)  BP(mean): 88 (16 Sep 2019 21:00) (88 - 98)  RR: 18 (17 Sep 2019 15:41) (18 - 28)  SpO2: 93% (17 Sep 2019 15:41) (86% - 98%)    PE:  Gen: AAO, NAD  HEENT:  sclera anicteric, MMM  Neck:  no thyromegaly, no LAD  CV:  nl S1 + S2, RRR, no m/r/g  Resp:  nl respiratory effort, CTA b/l  GI/ Abd: soft, NT/ ND, BS +  Neuro:  No tremor, sensation intact to light touch on b/l feet  MS:  no c/c/e, nl muscle tone  Skin:  no foot ulcers, no acanthosis nigricans  Psych:  affect appropriate    LABS:  09-17    138  |  100  |  43.0<H>  ----------------------------<  89  4.5   |  25.0  |  3.77<H>    Ca    7.6<L>      17 Sep 2019 06:09  Phos  3.7     09-16  Mg     2.0     09-17               10.3   10.68 )-----------( 163      ( 17 Sep 2019 06:09 )             33.0       Hemoglobin A1C, Whole Blood: 8.4 % (08.20.19 @ 13:09)  Thyroid Stimulating Hormone, Serum: 1.97 uIU/mL (08.20.19 @ 13:09)  Free Thyroxine, Serum: 1.3 ng/dL (08.20.19 @ 19:42)    CAPILLARY BLOOD GLUCOSE  POCT Blood Glucose.: 121 mg/dL (17 Sep 2019 12:11)  POCT Blood Glucose.: 83 mg/dL (17 Sep 2019 08:07)  POCT Blood Glucose.: 109 mg/dL (16 Sep 2019 17:41)

## 2019-09-17 NOTE — PROGRESS NOTE ADULT - PROBLEM SELECTOR PLAN 8
Protonix for GI ppx  HSQ and SCDs for dvt ppx    Case discussed with Dr. Mims at am rounds. Protonix for GI ppx  HSQ and SCDs for dvt ppx    Plan for discharge tomorrow 9/18  Case discussed with Dr. Mims at am rounds.

## 2019-09-18 ENCOUNTER — TRANSCRIPTION ENCOUNTER (OUTPATIENT)
Age: 59
End: 2019-09-18

## 2019-09-18 VITALS
WEIGHT: 203.93 LBS | RESPIRATION RATE: 18 BRPM | TEMPERATURE: 98 F | OXYGEN SATURATION: 97 % | DIASTOLIC BLOOD PRESSURE: 80 MMHG | HEART RATE: 82 BPM | SYSTOLIC BLOOD PRESSURE: 164 MMHG

## 2019-09-18 DIAGNOSIS — D50.0 IRON DEFICIENCY ANEMIA SECONDARY TO BLOOD LOSS (CHRONIC): ICD-10-CM

## 2019-09-18 LAB
ANION GAP SERPL CALC-SCNC: 15 MMOL/L — SIGNIFICANT CHANGE UP (ref 5–17)
BUN SERPL-MCNC: 58 MG/DL — HIGH (ref 8–20)
CALCIUM SERPL-MCNC: 7.5 MG/DL — LOW (ref 8.6–10.2)
CHLORIDE SERPL-SCNC: 99 MMOL/L — SIGNIFICANT CHANGE UP (ref 98–107)
CO2 SERPL-SCNC: 22 MMOL/L — SIGNIFICANT CHANGE UP (ref 22–29)
CREAT SERPL-MCNC: 4.2 MG/DL — HIGH (ref 0.5–1.3)
GLUCOSE BLDC GLUCOMTR-MCNC: 170 MG/DL — HIGH (ref 70–99)
GLUCOSE BLDC GLUCOMTR-MCNC: 87 MG/DL — SIGNIFICANT CHANGE UP (ref 70–99)
GLUCOSE SERPL-MCNC: 102 MG/DL — SIGNIFICANT CHANGE UP (ref 70–115)
HCT VFR BLD CALC: 29.4 % — LOW (ref 39–50)
HGB BLD-MCNC: 9.2 G/DL — LOW (ref 13–17)
MAGNESIUM SERPL-MCNC: 1.8 MG/DL — SIGNIFICANT CHANGE UP (ref 1.6–2.6)
MCHC RBC-ENTMCNC: 27 PG — SIGNIFICANT CHANGE UP (ref 27–34)
MCHC RBC-ENTMCNC: 31.3 GM/DL — LOW (ref 32–36)
MCV RBC AUTO: 86.2 FL — SIGNIFICANT CHANGE UP (ref 80–100)
PLATELET # BLD AUTO: 193 K/UL — SIGNIFICANT CHANGE UP (ref 150–400)
POTASSIUM SERPL-MCNC: 4.5 MMOL/L — SIGNIFICANT CHANGE UP (ref 3.5–5.3)
POTASSIUM SERPL-SCNC: 4.5 MMOL/L — SIGNIFICANT CHANGE UP (ref 3.5–5.3)
RBC # BLD: 3.41 M/UL — LOW (ref 4.2–5.8)
RBC # FLD: 15.9 % — HIGH (ref 10.3–14.5)
SODIUM SERPL-SCNC: 136 MMOL/L — SIGNIFICANT CHANGE UP (ref 135–145)
WBC # BLD: 11.03 K/UL — HIGH (ref 3.8–10.5)
WBC # FLD AUTO: 11.03 K/UL — HIGH (ref 3.8–10.5)

## 2019-09-18 PROCEDURE — P9016: CPT

## 2019-09-18 PROCEDURE — 73564 X-RAY EXAM KNEE 4 OR MORE: CPT

## 2019-09-18 PROCEDURE — 85610 PROTHROMBIN TIME: CPT

## 2019-09-18 PROCEDURE — 94002 VENT MGMT INPAT INIT DAY: CPT

## 2019-09-18 PROCEDURE — 86900 BLOOD TYPING SEROLOGIC ABO: CPT

## 2019-09-18 PROCEDURE — 99261: CPT

## 2019-09-18 PROCEDURE — 36415 COLL VENOUS BLD VENIPUNCTURE: CPT

## 2019-09-18 PROCEDURE — 82803 BLOOD GASES ANY COMBINATION: CPT

## 2019-09-18 PROCEDURE — 97530 THERAPEUTIC ACTIVITIES: CPT

## 2019-09-18 PROCEDURE — 82550 ASSAY OF CK (CPK): CPT

## 2019-09-18 PROCEDURE — 85027 COMPLETE CBC AUTOMATED: CPT

## 2019-09-18 PROCEDURE — 80076 HEPATIC FUNCTION PANEL: CPT

## 2019-09-18 PROCEDURE — 90937 HEMODIALYSIS REPEATED EVAL: CPT

## 2019-09-18 PROCEDURE — 97116 GAIT TRAINING THERAPY: CPT

## 2019-09-18 PROCEDURE — 80048 BASIC METABOLIC PNL TOTAL CA: CPT

## 2019-09-18 PROCEDURE — 82330 ASSAY OF CALCIUM: CPT

## 2019-09-18 PROCEDURE — 82435 ASSAY OF BLOOD CHLORIDE: CPT

## 2019-09-18 PROCEDURE — 85014 HEMATOCRIT: CPT

## 2019-09-18 PROCEDURE — 93005 ELECTROCARDIOGRAM TRACING: CPT

## 2019-09-18 PROCEDURE — P9045: CPT

## 2019-09-18 PROCEDURE — 93312 ECHO TRANSESOPHAGEAL: CPT

## 2019-09-18 PROCEDURE — 83735 ASSAY OF MAGNESIUM: CPT

## 2019-09-18 PROCEDURE — C1889: CPT

## 2019-09-18 PROCEDURE — 71045 X-RAY EXAM CHEST 1 VIEW: CPT

## 2019-09-18 PROCEDURE — 99232 SBSQ HOSP IP/OBS MODERATE 35: CPT

## 2019-09-18 PROCEDURE — 71045 X-RAY EXAM CHEST 1 VIEW: CPT | Mod: 26

## 2019-09-18 PROCEDURE — 82962 GLUCOSE BLOOD TEST: CPT

## 2019-09-18 PROCEDURE — 86850 RBC ANTIBODY SCREEN: CPT

## 2019-09-18 PROCEDURE — 93010 ELECTROCARDIOGRAM REPORT: CPT

## 2019-09-18 PROCEDURE — 84484 ASSAY OF TROPONIN QUANT: CPT

## 2019-09-18 PROCEDURE — 82947 ASSAY GLUCOSE BLOOD QUANT: CPT

## 2019-09-18 PROCEDURE — 83605 ASSAY OF LACTIC ACID: CPT

## 2019-09-18 PROCEDURE — 84100 ASSAY OF PHOSPHORUS: CPT

## 2019-09-18 PROCEDURE — 86901 BLOOD TYPING SEROLOGIC RH(D): CPT

## 2019-09-18 PROCEDURE — 97163 PT EVAL HIGH COMPLEX 45 MIN: CPT

## 2019-09-18 PROCEDURE — 84132 ASSAY OF SERUM POTASSIUM: CPT

## 2019-09-18 PROCEDURE — 84295 ASSAY OF SERUM SODIUM: CPT

## 2019-09-18 PROCEDURE — 94640 AIRWAY INHALATION TREATMENT: CPT

## 2019-09-18 PROCEDURE — 80202 ASSAY OF VANCOMYCIN: CPT

## 2019-09-18 PROCEDURE — 94760 N-INVAS EAR/PLS OXIMETRY 1: CPT

## 2019-09-18 PROCEDURE — 82553 CREATINE MB FRACTION: CPT

## 2019-09-18 PROCEDURE — 84550 ASSAY OF BLOOD/URIC ACID: CPT

## 2019-09-18 PROCEDURE — 85730 THROMBOPLASTIN TIME PARTIAL: CPT

## 2019-09-18 PROCEDURE — 86923 COMPATIBILITY TEST ELECTRIC: CPT

## 2019-09-18 PROCEDURE — 36430 TRANSFUSION BLD/BLD COMPNT: CPT

## 2019-09-18 RX ORDER — INSULIN GLARGINE 100 [IU]/ML
16 INJECTION, SOLUTION SUBCUTANEOUS
Qty: 0 | Refills: 0 | DISCHARGE
Start: 2019-09-18

## 2019-09-18 RX ORDER — ASCORBIC ACID 60 MG
500 TABLET,CHEWABLE ORAL DAILY
Refills: 0 | Status: DISCONTINUED | OUTPATIENT
Start: 2019-09-18 | End: 2019-09-18

## 2019-09-18 RX ORDER — CLOPIDOGREL BISULFATE 75 MG/1
1 TABLET, FILM COATED ORAL
Qty: 0 | Refills: 0 | DISCHARGE
Start: 2019-09-18

## 2019-09-18 RX ORDER — METOPROLOL TARTRATE 50 MG
1 TABLET ORAL
Qty: 90 | Refills: 1
Start: 2019-09-18 | End: 2019-11-16

## 2019-09-18 RX ORDER — INSULIN GLARGINE 100 [IU]/ML
10 INJECTION, SOLUTION SUBCUTANEOUS
Qty: 1 | Refills: 1
Start: 2019-09-18 | End: 2019-11-16

## 2019-09-18 RX ORDER — CLOPIDOGREL BISULFATE 75 MG/1
1 TABLET, FILM COATED ORAL
Qty: 30 | Refills: 1
Start: 2019-09-18 | End: 2019-11-16

## 2019-09-18 RX ORDER — ASPIRIN/CALCIUM CARB/MAGNESIUM 324 MG
1 TABLET ORAL
Qty: 0 | Refills: 0 | DISCHARGE
Start: 2019-09-18

## 2019-09-18 RX ORDER — FOLIC ACID 0.8 MG
1 TABLET ORAL
Qty: 0 | Refills: 0 | DISCHARGE
Start: 2019-09-18

## 2019-09-18 RX ORDER — INSULIN GLARGINE 100 [IU]/ML
15 INJECTION, SOLUTION SUBCUTANEOUS
Qty: 0 | Refills: 0 | DISCHARGE
Start: 2019-09-18

## 2019-09-18 RX ORDER — FOLIC ACID 0.8 MG
1 TABLET ORAL DAILY
Refills: 0 | Status: DISCONTINUED | OUTPATIENT
Start: 2019-09-18 | End: 2019-09-18

## 2019-09-18 RX ORDER — METOPROLOL TARTRATE 50 MG
1 TABLET ORAL
Qty: 0 | Refills: 0 | DISCHARGE
Start: 2019-09-18

## 2019-09-18 RX ORDER — ASCORBIC ACID 60 MG
1 TABLET,CHEWABLE ORAL
Qty: 30 | Refills: 1
Start: 2019-09-18 | End: 2019-11-16

## 2019-09-18 RX ORDER — FOLIC ACID 0.8 MG
1 TABLET ORAL
Qty: 30 | Refills: 1
Start: 2019-09-18 | End: 2019-11-16

## 2019-09-18 RX ORDER — DOCUSATE SODIUM 100 MG
1 CAPSULE ORAL
Qty: 0 | Refills: 0 | DISCHARGE
Start: 2019-09-18

## 2019-09-18 RX ORDER — FERROUS SULFATE 325(65) MG
325 TABLET ORAL THREE TIMES A DAY
Refills: 0 | Status: DISCONTINUED | OUTPATIENT
Start: 2019-09-18 | End: 2019-09-18

## 2019-09-18 RX ORDER — ASCORBIC ACID 60 MG
1 TABLET,CHEWABLE ORAL
Qty: 0 | Refills: 0 | DISCHARGE
Start: 2019-09-18

## 2019-09-18 RX ORDER — FERROUS SULFATE 325(65) MG
1 TABLET ORAL
Qty: 90 | Refills: 0
Start: 2019-09-18 | End: 2019-10-17

## 2019-09-18 RX ORDER — ENOXAPARIN SODIUM 100 MG/ML
15 INJECTION SUBCUTANEOUS
Qty: 1 | Refills: 1
Start: 2019-09-18 | End: 2019-11-16

## 2019-09-18 RX ORDER — ASPIRIN/CALCIUM CARB/MAGNESIUM 324 MG
1 TABLET ORAL
Qty: 30 | Refills: 1
Start: 2019-09-18 | End: 2019-11-16

## 2019-09-18 RX ORDER — FERROUS SULFATE 325(65) MG
1 TABLET ORAL
Qty: 90 | Refills: 1
Start: 2019-09-18 | End: 2019-11-16

## 2019-09-18 RX ADMIN — Medication 1 MILLIGRAM(S): at 12:35

## 2019-09-18 RX ADMIN — Medication 500 MILLIGRAM(S): at 12:35

## 2019-09-18 RX ADMIN — PANTOPRAZOLE SODIUM 40 MILLIGRAM(S): 20 TABLET, DELAYED RELEASE ORAL at 08:40

## 2019-09-18 RX ADMIN — SODIUM CHLORIDE 3 MILLILITER(S): 9 INJECTION INTRAMUSCULAR; INTRAVENOUS; SUBCUTANEOUS at 12:26

## 2019-09-18 RX ADMIN — Medication 5 UNIT(S): at 08:40

## 2019-09-18 RX ADMIN — BUDESONIDE AND FORMOTEROL FUMARATE DIHYDRATE 2 PUFF(S): 160; 4.5 AEROSOL RESPIRATORY (INHALATION) at 09:31

## 2019-09-18 RX ADMIN — CHLORHEXIDINE GLUCONATE 1 APPLICATION(S): 213 SOLUTION TOPICAL at 08:33

## 2019-09-18 RX ADMIN — Medication 25 MILLIGRAM(S): at 06:07

## 2019-09-18 RX ADMIN — HEPARIN SODIUM 5000 UNIT(S): 5000 INJECTION INTRAVENOUS; SUBCUTANEOUS at 06:07

## 2019-09-18 RX ADMIN — Medication 5 UNIT(S): at 12:36

## 2019-09-18 RX ADMIN — ERYTHROPOIETIN 10000 UNIT(S): 10000 INJECTION, SOLUTION INTRAVENOUS; SUBCUTANEOUS at 14:05

## 2019-09-18 RX ADMIN — Medication 325 MILLIGRAM(S): at 12:35

## 2019-09-18 RX ADMIN — HEPARIN SODIUM 5000 UNIT(S): 5000 INJECTION INTRAVENOUS; SUBCUTANEOUS at 12:35

## 2019-09-18 RX ADMIN — Medication 2: at 12:37

## 2019-09-18 RX ADMIN — Medication 325 MILLIGRAM(S): at 08:40

## 2019-09-18 RX ADMIN — CLOPIDOGREL BISULFATE 75 MILLIGRAM(S): 75 TABLET, FILM COATED ORAL at 08:40

## 2019-09-18 RX ADMIN — SODIUM CHLORIDE 3 MILLILITER(S): 9 INJECTION INTRAMUSCULAR; INTRAVENOUS; SUBCUTANEOUS at 05:55

## 2019-09-18 NOTE — DISCHARGE NOTE NURSING/CASE MANAGEMENT/SOCIAL WORK - NSSCCARECORD_GEN_ALL_CORE
[employer] Community Resource/Home Care Agency/Durable Medical Equipment Agency

## 2019-09-18 NOTE — DISCHARGE NOTE PROVIDER - CARE PROVIDERS DIRECT ADDRESSES
[email or web address]

## 2019-09-18 NOTE — PROGRESS NOTE ADULT - PROBLEM SELECTOR PROBLEM 6
DM2 (diabetes mellitus, type 2)
DM2 (diabetes mellitus, type 2)
ESRD (end stage renal disease) on dialysis
ESRD (end stage renal disease) on dialysis
HLD (hyperlipidemia)

## 2019-09-18 NOTE — DISCHARGE NOTE NURSING/CASE MANAGEMENT/SOCIAL WORK - NSDCPEWEB_GEN_ALL_CORE
[place] website --- [email or web address] for Tobacco [email or web address] website --- [email or web address]

## 2019-09-18 NOTE — DISCHARGE NOTE PROVIDER - INSTRUCTIONS
Choose lean meats and poultry without skin and prepare them without added saturated and trans fat.  Eat fish at least twice a week. Recent research shows that eating oily fish containing omega-3 fatty acids (for example, salmon, trout and herring) may help lower your risk of death from coronary artery disease.  Select fat-free, 1 percent fat and low-fat dairy products.  Cut back on foods containing partially hydrogenated vegetable oils to reduce trans fat in your diet.   To lower cholesterol, reduce saturated fat to no more than 5 to 6 percent of total calories. For someone eating 2,000 calories a day, that’s about 13 grams of saturated fat.  Cut back on beverages and foods with added sugars.  Choose and prepare foods with little or no salt.

## 2019-09-18 NOTE — PROGRESS NOTE ADULT - SUBJECTIVE AND OBJECTIVE BOX
[facility] DIVISION OF KIDNEY DISEASES AND HYPERTENSION -- FOLLOW UP NOTE  --------------------------------------------------------------------------------  Chief Complaint: ESRD HD    24 hour events/subjective:  Seen/examined  On HD today;  Plan for DC today;        PAST HISTORY  --------------------------------------------------------------------------------  No significant changes to PMH, PSH, FHx, SHx, unless otherwise noted    ALLERGIES & MEDICATIONS  --------------------------------------------------------------------------------  Allergies    lactose (Flatulence (Severe); Diarrhea (Severe); Stomach Upset (Severe); Nausea (Severe))  No Known Drug Allergies    Intolerances    OHS (Unknown)    Standing Inpatient Medications  ascorbic acid 500 milliGRAM(s) Oral daily  aspirin enteric coated 325 milliGRAM(s) Oral daily  atorvastatin 40 milliGRAM(s) Oral at bedtime  buDESOnide 160 MICROgram(s)/formoterol 4.5 MICROgram(s) Inhaler 2 Puff(s) Inhalation two times a day  chlorhexidine 2% Cloths 1 Application(s) Topical daily  clopidogrel Tablet 75 milliGRAM(s) Oral daily  docusate sodium 100 milliGRAM(s) Oral three times a day  epoetin felix Injectable 80590 Unit(s) IV Push <User Schedule>  ferrous    sulfate 325 milliGRAM(s) Oral three times a day  folic acid 1 milliGRAM(s) Oral daily  heparin  Injectable 5000 Unit(s) SubCutaneous every 8 hours  insulin glargine Injectable (LANTUS) 16 Unit(s) SubCutaneous at bedtime  insulin lispro (HumaLOG) corrective regimen sliding scale   SubCutaneous Before meals and at bedtime  insulin lispro Injectable (HumaLOG) 5 Unit(s) SubCutaneous three times a day before meals  metoprolol tartrate 25 milliGRAM(s) Oral three times a day  pantoprazole    Tablet 40 milliGRAM(s) Oral daily  polyethylene glycol 3350 17 Gram(s) Oral daily  sodium chloride 0.9% lock flush 3 milliLiter(s) IV Push every 8 hours    PRN Inpatient Medications  ALBUTerol/ipratropium for Nebulization 3 milliLiter(s) Nebulizer every 6 hours PRN  oxyCODONE    5 mG/acetaminophen 325 mG 1 Tablet(s) Oral every 4 hours PRN  oxyCODONE    5 mG/acetaminophen 325 mG 2 Tablet(s) Oral every 6 hours PRN      REVIEW OF SYSTEMS  --------------------------------------------------------------------------------  Gen: No weight changes, fatigue, fevers/chills, weakness  Skin: No rashes  Head/Eyes/Ears/Mouth: No headache; Normal hearing; Normal vision w/o blurriness; No sinus pain/discomfort, sore throat  Respiratory: No dyspnea, cough, wheezing, hemoptysis  CV: No chest pain, PND, orthopnea  GI: No abdominal pain, diarrhea, constipation, nausea, vomiting, melena, hematochezia  : No increased frequency, dysuria, hematuria, nocturia  MSK: No joint pain/swelling; no back pain; no edema  Neuro: No dizziness/lightheadedness, weakness, seizures, numbness, tingling  Heme: No easy bruising or bleeding  Endo: No heat/cold intolerance  Psych: No significant nervousness, anxiety, stress, depression    All other systems were reviewed and are negative, except as noted.    VITALS/PHYSICAL EXAM  --------------------------------------------------------------------------------  T(C): 36.4 (09-18-19 @ 13:21), Max: 37.3 (09-17-19 @ 21:51)  HR: 68 (09-18-19 @ 13:21) (68 - 91)  BP: 143/60 (09-18-19 @ 13:21) (114/72 - 148/72)  RR: 18 (09-18-19 @ 13:21) (17 - 18)  SpO2: 97% (09-18-19 @ 13:21) (93% - 100%)  Wt(kg): --        09-17-19 @ 07:01  -  09-18-19 @ 07:00  --------------------------------------------------------  IN: 540 mL / OUT: 400 mL / NET: 140 mL    09-18-19 @ 07:01  -  09-18-19 @ 14:03  --------------------------------------------------------  IN: 240 mL / OUT: 0 mL / NET: 240 mL      Physical Exam:  	Gen: NAD, well-appearing  	HEENT: PERRL, supple neck, clear oropharynx  	Pulm: CTA B/L  	CV: RRR, S1S2; no rub  	Back: No spinal or CVA tenderness; no sacral edema  	Abd: +BS, soft, nontender/nondistended  	: No suprapubic tenderness  	UE: Warm, FROM, no clubbing, intact strength; no edema; no asterixis  	LE: Warm, FROM, no clubbing, intact strength; no edema  	Neuro: No focal deficits, intact gait  	Psych: Normal affect and mood  	Skin: Warm, without rashes  	Vascular access:    LABS/STUDIES  --------------------------------------------------------------------------------              9.2    11.03 >-----------<  193      [09-18-19 @ 06:14]              29.4     136  |  99  |  58.0  ----------------------------<  102      [09-18-19 @ 06:14]  4.5   |  22.0  |  4.20        Ca     7.5     [09-18-19 @ 06:14]      Mg     1.8     [09-18-19 @ 06:14]            Creatinine Trend:  SCr 4.20 [09-18 @ 06:14]  SCr 3.77 [09-17 @ 06:09]  SCr 4.75 [09-16 @ 04:29]  SCr 3.77 [09-15 @ 02:50]  SCr 3.07 [09-14 @ 17:09]    Urinalysis - [08-20-19 @ 13:32]      Color Yellow / Appearance Clear / SG 1.005 / pH 7.0      Gluc Negative / Ketone Negative  / Bili Negative / Urobili Negative       Blood Small / Protein 100 / Leuk Est Negative / Nitrite Negative      RBC 3-5 / WBC 0-2 / Hyaline  / Gran  / Sq Epi  / Non Sq Epi Occasional / Bacteria Occasional      Iron 37, TIBC 204, %sat 18      [08-20-19 @ 15:20]  Ferritin 305      [08-20-19 @ 15:20]  PTH -- (Ca 6.9)      [08-20-19 @ 23:33]   456  Vitamin D (25OH) 11.3      [08-21-19 @ 00:00]  HbA1c 8.4      [08-20-19 @ 13:09]  TSH 1.97      [08-20-19 @ 13:09]  Lipid: chol 209, , HDL 32,       [08-20-19 @ 13:09]    HBsAb <3.0      [08-21-19 @ 22:09]  HBsAg Nonreact      [08-21-19 @ 22:09]  HBcAb Nonreact      [08-21-19 @ 22:09]  HCV 0.12, Nonreact      [08-21-19 @ 17:20]

## 2019-09-18 NOTE — DISCHARGE NOTE PROVIDER - CARE PROVIDER_API CALL
Ephraim Mims)  Surgery; Thoracic and Cardiac Surgery  [address] [place] [address]  Phone: [phone]  Fax: [phone]  Follow Up Time:     aYsh Drew)  EndocrinologyMetabDiabetes; Internal Medicine  [address] [place] [address]  Phone: [phone]  Fax: [phone]  Follow Up Time:

## 2019-09-18 NOTE — PROGRESS NOTE ADULT - ASSESSMENT
60yo Male with a PMHx DM (HA1c 8.4), HTN, HLD, prior CVA, sciatica, and former smoker, recently [place] [facility] with acute on chronic CKD, new to HD requiring R chest permacath, and also presenting with NSTEMI with cardiac cath showing MVD. Patient was transferred to [facility] for CABG evalulation.  Patient underwent LUE AVF placement 8/26. Patient was admitted to [facility] electively on 9/13 for same day CABG. Patient underwent C3L on 9/13. Chest tube was removed 9/15. Patient admitted to right knee pain, with negative right knee Xray and uric acid of 5.8 on 9/16. Patient was non complaint with medications as an outpatient and a poor historian.

## 2019-09-18 NOTE — DISCHARGE NOTE PROVIDER - NSDCCPCAREPLAN_GEN_ALL_CORE_FT
PRINCIPAL DISCHARGE DIAGNOSIS  Diagnosis: CAD (coronary artery disease)  Assessment and Plan of Treatment: 1. Take ALL of your medications as ordered. Fill your prescriptions the day you are discharged and take according to the schedule you were given. Continue to take a stool softener if you are taking narcotic pain medications. AVOID medications such as ibuprofen or naproxen if you have had bypass surgery. If you have any questions or are unable to fill the prescriptions, please call the office right away at [phone].  2. Shower daily. Clean all incisions daily while showering with warm water and mild soap, pat dry with a clean towel and do not cover with any dressings unless instructed to. No bathing, swimming in a pool or the ocean until instructed by MD.  DO NOT use creams or lotions on the wound.  3. We advise that you do not drive until instructed by MD.   4. You may not return to work until instructed by MD.   5. Please eat a low fat, low cholesterol, low salt diet. (No added/extra salt)  6. Weigh yourself every day in the morning and record it in the weight log in your red folder. Notify the office of any weight gain more than 2-3 pounds in 24 hours.  7. Continue breathing exercises several times a day. Continue to use your heart pillow.  8. No heavy lifting nothing greater than 5 pounds until cleared by MD.   9. Call / Notify MD any fever greater than 101.0, any drainage from incisions or if they become red, hot or very tender to the touch.  10. Increase activity as tolerated. Walk indoors and/or outdoors at least 3 times a day.  Please follow up with Dr. Mims on October 9, 2019 at 2:00pm.      SECONDARY DISCHARGE DIAGNOSES  Diagnosis: ESRD (end stage renal disease) on dialysis  Assessment and Plan of Treatment: Continue with dialysis at [facility] scheduled Monday, Wednesday, and Friday.  Follow up with your Nephrologist and vascular surgery within 2 weeks of discharge to evaluate fistula.    Diagnosis: Anemia due to blood loss  Assessment and Plan of Treatment: Continue with irone supplement (ferrous sulfate), vitamin C, and folic acid to help with anemia. Stop these medications 1 month after discharge.  By taking iron (ferrous sulfate), folate and vitamin C as directed for one month, your red blood cell count should improve. Please take a stool softener with the medications as they may cause constipation.    Diagnosis: HTN (hypertension)  Assessment and Plan of Treatment: Take all medications as prescribed.      Diagnosis: HLD (hyperlipidemia)  Assessment and Plan of Treatment: Take all medications as prescribed.      Diagnosis: DM2 (diabetes mellitus, type 2)  Assessment and Plan of Treatment: It is extremely important to follow a diabetic (consistent carbohydrates, LOW/NO ADDED SUGAR) diet and monitor your glucose (sugar) levels. You have an increased risk of complications, including wound infections, due to the diabetes.  1. Check your glucoses 3-4 times daily before meals and bedtime.   2. Keep a log of your glucose levels every day.   3. Make an appointment to meet with your primary care provider or endocrinologist within a week.   4. Take ALL of your medications as prescribed. Only hold medications for low glucose levels (< 80) or if you are symptomatic (dizzy, sweating, lightheaded).  5. Ideally, we would like all of the glucose levels to be between .   You may have been discharged on different medications than you were taking before. Your body reacts differently to the medications after surgery. Please continue to take the medications as prescribed and notify the office, nurse practitioner or your PCP if you have any concerns right away.  Your diabetes medication includes Tradjenta 5mg to take once daily in am and 15units of insulin (lantus) that you will self-inject after dinner.

## 2019-09-18 NOTE — PROGRESS NOTE ADULT - PROBLEM SELECTOR PLAN 7
Cont lantus, premeal MAURICE.  Cont FS BS checks as per protocol.  Endo consult appreciated. Will be discharged on tradjenta 5mg and lantus at home.   RN order to teach patient how to self-inject insulin placed.

## 2019-09-18 NOTE — PROGRESS NOTE ADULT - PROBLEM SELECTOR PLAN 5
Cont lipitor
- Pain control with percocet prn  - Encourage DBE/IS, wean NC as toelrated   - Daily CXR  - consider lopressor, hold for SBP less than 100 or HR less than 60    - Asa therapy for graft patency  - Statin therpay for chronic graft occlussion ppx   - Protonix for GI ppx  - Strict i/o's  - Chemical DVT ppx with SQH, maintain SCD's for mechanical DVT ppx
- Pain control with percocet prn  - Encourage DBE/IS, wean NC as toelrated   - Daily CXR  - consider lopressor, hold for SBP less than 100 or HR less than 60    - Asa therapy for graft patency  - Statin therpay for chronic graft occlussion ppx   - Protonix for GI ppx  - Strict i/o's  - Chemical DVT ppx with SQH, maintain SCD's for mechanical DVT ppx
Cont lopressor 25mg TID.  Will continue to monitor.
cont lipitor

## 2019-09-18 NOTE — DISCHARGE NOTE NURSING/CASE MANAGEMENT/SOCIAL WORK - NSDCPEEMAIL_GEN_ALL_CORE
[facility] for Tobacco Control email [email or web address]

## 2019-09-18 NOTE — DISCHARGE NOTE PROVIDER - NSDCACTIVITY_GEN_ALL_CORE
Do not drive or operate machinery/Stairs allowed/Showering allowed/Walking - Indoors allowed/Walking - Outdoors allowed/Do not make important decisions/No heavy lifting/straining

## 2019-09-18 NOTE — PROGRESS NOTE ADULT - ASSESSMENT
59 year old male with Type 2 DM with associated ESRD now on HD, HTN, recent NSTEMI and multivessel CAD s/p 3V CABG.   Glycemic control is improving.      1.  Type 2 DM-   On discharge, would reduce Lantus to 10 units qhs and resume Tradjenta.  Patient should follow up in office in several weeks after discharge.    2.  CAD-  s/p CABG.  Management as per CT surgery team.  On ASA, Plavix, statin and metoprolol.  3.  Hyperlipidemia-  continue Atorvastatin  4.  ESRD-  on HD, renal following.

## 2019-09-18 NOTE — DISCHARGE NOTE NURSING/CASE MANAGEMENT/SOCIAL WORK - PATIENT PORTAL LINK FT
You can access the FollowMyHealth Patient Portal offered by [facility] by registering at the following website: [email or web address]. By joining [employer]’s FollowMyHealth portal, you will also be able to view your health information using other applications (apps) compatible with our system.

## 2019-09-18 NOTE — PROGRESS NOTE ADULT - PROBLEM SELECTOR PROBLEM 3
Cerebrovascular accident (CVA)
Cerebrovascular accident (CVA)
ESRD (end stage renal disease) on dialysis
HLD (hyperlipidemia)
HLD (hyperlipidemia)

## 2019-09-18 NOTE — PROGRESS NOTE ADULT - PROBLEM SELECTOR PLAN 1
S/p C3L on 9/13.  Chest tube removed 9/15.  Left groin stitch to be removed today.  Encourage OOB, ambulate as tolerated with assistance.  PT following.  Encourage CDBE, I/S, coughing, and chest PT as needed.  Pain control with PRN percocet.  Tolerating diet.   Continue bowel regimen. Last BM yesterday 9/17.  Hemodynamically stable. Stable H/H.  Cont ASA, plavix, lipitor for graft patency.  Cont epogen as per Nephro.  Cont lopressor to 25mg TID for HR/BP control.  New to HD, scheduled MWF, still makes urine.   Cont to monitor Is/Os.

## 2019-09-18 NOTE — PROGRESS NOTE ADULT - SUBJECTIVE AND OBJECTIVE BOX
POD #5 s/p C3L    Subjective: "I'm ready to go home." States pain is controlled this am. Passed a normal BM yesterday as per patient. Tolerating diet. Patient states he walked with PT yesterday with no noted issues. Patient states he lives at home with his girlfriend who is retired and capable of taking care of him. No fevers, lightheadedness, CP, palpitations, SOB, cough, abdominal pain, darker or bloody stool, or any other acute complaints noted.     VITAL SIGNS  Vital Signs Last 24 Hrs  T(C): 36.9 (19 @ 05:58), Max: 37.3 (19 @ 21:51)  T(F): 98.4 (19 @ 05:58), Max: 99.2 (19 @ 21:51)  HR: 78 (19 @ 05:58) (69 - 91)  BP: 126/78 (19 @ 05:58) (100/59 - 148/72)  RR: 18 (19 @ 05:58) (18 - 18)  SpO2: 99% (19 @ 05:58) (93% - 100%)  on (O2)              Telemetry/Alarms: Overnight Sinus rhythm with rate controlled in 70s  LVEF: 40% on TTE 2019 pre-op, 55% during surgery    MEDICATIONS  ALBUTerol/ipratropium for Nebulization 3 milliLiter(s) Nebulizer every 6 hours PRN  aspirin enteric coated 325 milliGRAM(s) Oral daily  atorvastatin 40 milliGRAM(s) Oral at bedtime  buDESOnide 160 MICROgram(s)/formoterol 4.5 MICROgram(s) Inhaler 2 Puff(s) Inhalation two times a day  chlorhexidine 2% Cloths 1 Application(s) Topical daily  clopidogrel Tablet 75 milliGRAM(s) Oral daily  docusate sodium 100 milliGRAM(s) Oral three times a day  epoetin felix Injectable 67905 Unit(s) IV Push <User Schedule>  heparin  Injectable 5000 Unit(s) SubCutaneous every 8 hours  insulin glargine Injectable (LANTUS) 16 Unit(s) SubCutaneous at bedtime  insulin lispro (HumaLOG) corrective regimen sliding scale   SubCutaneous Before meals and at bedtime  insulin lispro Injectable (HumaLOG) 5 Unit(s) SubCutaneous three times a day before meals  metoprolol tartrate 25 milliGRAM(s) Oral three times a day  oxyCODONE    5 mG/acetaminophen 325 mG 1 Tablet(s) Oral every 4 hours PRN  oxyCODONE    5 mG/acetaminophen 325 mG 2 Tablet(s) Oral every 6 hours PRN  pantoprazole    Tablet 40 milliGRAM(s) Oral daily  polyethylene glycol 3350 17 Gram(s) Oral daily  sodium chloride 0.9% lock flush 3 milliLiter(s) IV Push every 8 hours      PHYSICAL EXAM  General: well nourished, well developed, no acute distress  Neurology: alert and oriented x 3, nonfocal, no gross deficits  Respiratory: clear to auscultation bilaterally, no wheezing/rhonchi/rales noted  CV: regular rate and rhythm, normal S1, S2  Abdomen: soft, nontender, nondistended, positive bowel sounds, last bowel movement yesterday , normal in consistence and color as per patient  Extremities: warm, well perfused. no edema. ESTRADA. + DP pulses. Bandage over LLE and RLE incision sites c/d/i  Vascular: +2 DP and radial pulses b/l, Left forearm AVF, +thrill (still maturing)  Incisions: midline sternal incision, + provena, c/d/i. sternum stable. No click.   Right chest permacath site c/d/i, no drainage, no surrounding erythema/warmth/swelling       @ 07:01  -   @ 07:00  --------------------------------------------------------  IN: 540 mL / OUT: 400 mL / NET: 140 mL        Weights:  Daily     Daily Weight in k.1 (18 Sep 2019 06:52)  Admit Wt: Drug Dosing Weight  Height (cm): 165.1 (14 Sep 2019 05:15)  Weight (kg): 89.4 (14 Sep 2019 05:15)  BMI (kg/m2): 32.8 (14 Sep 2019 05:15)  BSA (m2): 1.97 (14 Sep 2019 05:15)    All laboratory results, radiology and medications reviewed.    LABS      136  |  99  |  58.0<H>  ----------------------------<  102  4.5   |  22.0  |  4.20<H>    Ca    7.5<L>      18 Sep 2019 06:14  Mg     1.8                                        9.2    11.03 )-----------( 193      ( 18 Sep 2019 06:14 )             29.4              CAPILLARY BLOOD GLUCOSE      POCT Blood Glucose.: 87 mg/dL (18 Sep 2019 08:26)  POCT Blood Glucose.: 124 mg/dL (17 Sep 2019 22:42)  POCT Blood Glucose.: 123 mg/dL (17 Sep 2019 21:08)  POCT Blood Glucose.: 111 mg/dL (17 Sep 2019 19:45)  POCT Blood Glucose.: 73 mg/dL (17 Sep 2019 17:46)  POCT Blood Glucose.: 67 mg/dL (17 Sep 2019 17:31)  POCT Blood Glucose.: 48 mg/dL (17 Sep 2019 17:18)  POCT Blood Glucose.: 71 mg/dL (17 Sep 2019 17:15)  POCT Blood Glucose.: 121 mg/dL (17 Sep 2019 12:11)           Today's CXR: Ordered and pending    Today's EKG: Ordered and pending      PAST MEDICAL & SURGICAL HISTORY:  HLD (hyperlipidemia)  Cerebrovascular accident (CVA): Poor historian, unaware of date  DM2 (diabetes mellitus, type 2)  HTN (hypertension)  No significant past surgical history

## 2019-09-18 NOTE — PROGRESS NOTE ADULT - PROBLEM SELECTOR PLAN 3
No residual deficits.  Cont asa/plavix.
HD scheduled today (M/W/F).   Cont HD via R chest permacath, needs additional 2-3 weeks for LUE AVF to mature per vascular.   Vascular consult appreciated.   Has placement at [facility] upon discharge.  Cont epogen as per Nephro.  Nephro consult appreciated.
continue statin therapy
contiue statin therapy
no residual deficits  continue asa/plavix

## 2019-09-18 NOTE — DISCHARGE NOTE NURSING/CASE MANAGEMENT/SOCIAL WORK - NSDCPEPTSTRK_GEN_ALL_CORE
Call 911 for stroke/Stroke support groups for patients, families, and friends/Need for follow up after discharge/Prescribed medications/Risk factors for stroke/Stroke warning signs and symptoms/Signs and symptoms of stroke/Stroke education booklet

## 2019-09-18 NOTE — PROGRESS NOTE ADULT - PROBLEM SELECTOR PLAN 6
BS controlled for the last 2 days.   Cont lantus, premeal MAURICE.  Cont FS BS checks as per protocol.  Diabetic specialist and Endo called for consult today.
Cont lipitor
cont lantus, premeal MAURICE,   needs endo consult
renal dose all meds  Renal following  plan for dialysis in AM
renal dose all meds  Vascular following AVF  Renal following  dialysis per renal

## 2019-09-18 NOTE — PROGRESS NOTE ADULT - PROBLEM SELECTOR PROBLEM 4
HTN (hypertension)
Cerebrovascular accident (CVA)
DM2 (diabetes mellitus, type 2)
DM2 (diabetes mellitus, type 2)
HTN (hypertension)

## 2019-09-18 NOTE — PROGRESS NOTE ADULT - PROBLEM SELECTOR PROBLEM 5
HLD (hyperlipidemia)
CAD (coronary artery disease)
CAD (coronary artery disease)
HLD (hyperlipidemia)
HTN (hypertension)

## 2019-09-18 NOTE — PROGRESS NOTE ADULT - PROBLEM SELECTOR PROBLEM 2
ESRD (end stage renal disease) on dialysis
Anemia due to blood loss
ESRD (end stage renal disease) on dialysis
HTN (hypertension)
HTN (hypertension)

## 2019-09-18 NOTE — DISCHARGE NOTE PROVIDER - HOSPITAL COURSE
60yo Male with a PMHx DM (HA1c 8.4), HTN, HLD, prior CVA, sciatica, and former smoker, recently admitted to [facility] with acute on chronic CKD, new to HD requiring R chest permacath, and also presenting with NSTEMI with cardiac cath showing MVD. Patient was transferred to [facility] for CABG evaluation.  Patient underwent LUE AVF placement 8/26. Patient was admitted to [facility] electively on 9/13 for same day CABG. Patient underwent C3L on 9/13. Chest tube was removed 9/15. Patient admitted to right knee pain, with negative right knee Xray and uric acid of 5.8 on 9/16. Telemetry monitor reviewed throughout hospital stay. Patient stable for discharge on 9/18.

## 2019-09-18 NOTE — PROGRESS NOTE ADULT - ASSESSMENT
1) ESRD on HD  2) Anemia of renal dx  3) MBD of renal dx  4) Vol HTN    cw epo 10k units TIW ;   HD today;   d/w CTU PA

## 2019-09-18 NOTE — PROGRESS NOTE ADULT - SUBJECTIVE AND OBJECTIVE BOX
Seen for follow up of Type 2 DM    Interval HPI/ Overnight Events:  Patient seen in dialysis suite today.  Had episode of hypoglycemia yesterday afternoon and insulin doses were reduced.  Patient denies any associated CP or SOB.    MEDICATIONS  (STANDING):  ascorbic acid 500 milliGRAM(s) Oral daily  aspirin enteric coated 325 milliGRAM(s) Oral daily  atorvastatin 40 milliGRAM(s) Oral at bedtime  buDESOnide 160 MICROgram(s)/formoterol 4.5 MICROgram(s) Inhaler 2 Puff(s) Inhalation two times a day  chlorhexidine 2% Cloths 1 Application(s) Topical daily  clopidogrel Tablet 75 milliGRAM(s) Oral daily  docusate sodium 100 milliGRAM(s) Oral three times a day  epoetin felix Injectable 75209 Unit(s) IV Push <User Schedule>  ferrous    sulfate 325 milliGRAM(s) Oral three times a day  folic acid 1 milliGRAM(s) Oral daily  heparin  Injectable 5000 Unit(s) SubCutaneous every 8 hours  insulin glargine Injectable (LANTUS) 16 Unit(s) SubCutaneous at bedtime  insulin lispro (HumaLOG) corrective regimen sliding scale   SubCutaneous Before meals and at bedtime  insulin lispro Injectable (HumaLOG) 5 Unit(s) SubCutaneous three times a day before meals  metoprolol tartrate 25 milliGRAM(s) Oral three times a day  pantoprazole    Tablet 40 milliGRAM(s) Oral daily  polyethylene glycol 3350 17 Gram(s) Oral daily  sodium chloride 0.9% lock flush 3 milliLiter(s) IV Push every 8 hours    Vital Signs Last 24 Hrs  T(C): 36.4 (18 Sep 2019 13:21), Max: 37.3 (17 Sep 2019 21:51)  T(F): 97.6 (18 Sep 2019 13:21), Max: 99.2 (17 Sep 2019 21:51)  HR: 68 (18 Sep 2019 13:21) (68 - 79)  BP: 143/60 (18 Sep 2019 13:21) (114/72 - 143/60)  RR: 18 (18 Sep 2019 13:21) (17 - 18)  SpO2: 97% (18 Sep 2019 13:21) (94% - 100%)    PE:  Gen: AAO, NAD  HEENT:  sclera anicteric, MMM  Neck:  no thyromegaly, no LAD  CV:  nl S1 + S2, RRR, no m/r/g  Resp:  nl respiratory effort, CTA b/l  GI/ Abd: soft, NT/ ND, BS +  MS:  no c/c/e  Skin:  no acanthosis  Psych:  affect appropriate    LABS:  09-18    136  |  99  |  58.0<H>  ----------------------------<  102  4.5   |  22.0  |  4.20<H>    Ca    7.5<L>      18 Sep 2019 06:14  Mg     1.8     09-18                          9.2    11.03 )-----------( 193      ( 18 Sep 2019 06:14 )             29.4       CAPILLARY BLOOD GLUCOSE  POCT Blood Glucose.: 170 mg/dL (18 Sep 2019 12:22)  POCT Blood Glucose.: 87 mg/dL (18 Sep 2019 08:26)  POCT Blood Glucose.: 124 mg/dL (17 Sep 2019 22:42)  POCT Blood Glucose.: 123 mg/dL (17 Sep 2019 21:08)  POCT Blood Glucose.: 111 mg/dL (17 Sep 2019 19:45)  POCT Blood Glucose.: 73 mg/dL (17 Sep 2019 17:46)  POCT Blood Glucose.: 67 mg/dL (17 Sep 2019 17:31)  POCT Blood Glucose.: 48 mg/dL (17 Sep 2019 17:18)  POCT Blood Glucose.: 71 mg/dL (17 Sep 2019 17:15)

## 2019-09-18 NOTE — PROGRESS NOTE ADULT - PROVIDER SPECIALTY LIST ADULT
Anesthesia
CT Surgery
Endocrinology
Nephrology
CT Surgery

## 2019-09-18 NOTE — PROGRESS NOTE ADULT - PROBLEM SELECTOR PROBLEM 1
CAD (coronary artery disease)
Cerebrovascular accident (CVA)
Cerebrovascular accident (CVA)

## 2019-09-18 NOTE — DISCHARGE NOTE PROVIDER - NSDCFUADDAPPT_GEN_ALL_CORE_FT
You have an appointment scheduled for October 9th at 2:00pm with your cardiac surgeon Dr. Mims.   Please schedule an appointment with Endocrinologist Dr. Drew for a follow up on your diabetes.   Please follow up with your Cardiologist and Primary Care Physician 2-4 weeks from discharge.    Please call the Cardiothoracic Surgery office at [phone] if you are experiencing any shortness of breath, chest pain, fevers or chills, drainage from the incisions, persistent nausea, vomiting or if you have any questions about your medications. If the symptoms are severe, call 911 and go to the nearest hospital.

## 2019-09-20 ENCOUNTER — EMERGENCY (EMERGENCY)
Facility: HOSPITAL | Age: 59
LOS: 1 days | End: 2019-09-20
Admitting: EMERGENCY MEDICINE
Payer: MEDICAID

## 2019-09-20 PROCEDURE — 99284 EMERGENCY DEPT VISIT MOD MDM: CPT

## 2019-09-20 PROCEDURE — 93926 LOWER EXTREMITY STUDY: CPT | Mod: 26,LT

## 2019-09-23 ENCOUNTER — INPATIENT (INPATIENT)
Facility: HOSPITAL | Age: 59
LOS: 2 days | Discharge: HOME | DRG: 862 | End: 2019-09-26
Attending: THORACIC SURGERY (CARDIOTHORACIC VASCULAR SURGERY) | Admitting: THORACIC SURGERY (CARDIOTHORACIC VASCULAR SURGERY)
Payer: MEDICARE

## 2019-09-23 VITALS
SYSTOLIC BLOOD PRESSURE: 126 MMHG | TEMPERATURE: 99 F | RESPIRATION RATE: 18 BRPM | DIASTOLIC BLOOD PRESSURE: 50 MMHG | OXYGEN SATURATION: 96 % | WEIGHT: 205.03 LBS | HEIGHT: 65 IN | HEART RATE: 82 BPM

## 2019-09-23 DIAGNOSIS — T81.40XA INFECTION FOLLOWING A PROCEDURE, UNSPECIFIED, INITIAL ENCOUNTER: ICD-10-CM

## 2019-09-23 DIAGNOSIS — N18.6 END STAGE RENAL DISEASE: ICD-10-CM

## 2019-09-23 DIAGNOSIS — Z95.1 PRESENCE OF AORTOCORONARY BYPASS GRAFT: ICD-10-CM

## 2019-09-23 DIAGNOSIS — Z95.1 PRESENCE OF AORTOCORONARY BYPASS GRAFT: Chronic | ICD-10-CM

## 2019-09-23 LAB
ALBUMIN SERPL ELPH-MCNC: 3.9 G/DL — SIGNIFICANT CHANGE UP (ref 3.3–5.2)
ALP SERPL-CCNC: 93 U/L — SIGNIFICANT CHANGE UP (ref 40–120)
ALT FLD-CCNC: 14 U/L — SIGNIFICANT CHANGE UP
ANION GAP SERPL CALC-SCNC: 15 MMOL/L — SIGNIFICANT CHANGE UP (ref 5–17)
ANISOCYTOSIS BLD QL: SLIGHT — SIGNIFICANT CHANGE UP
APTT BLD: 27.4 SEC — LOW (ref 27.5–36.3)
AST SERPL-CCNC: 14 U/L — SIGNIFICANT CHANGE UP
BASOPHILS # BLD AUTO: 0 K/UL — SIGNIFICANT CHANGE UP (ref 0–0.2)
BASOPHILS NFR BLD AUTO: 0 % — SIGNIFICANT CHANGE UP (ref 0–2)
BILIRUB SERPL-MCNC: 0.5 MG/DL — SIGNIFICANT CHANGE UP (ref 0.4–2)
BUN SERPL-MCNC: 58 MG/DL — HIGH (ref 8–20)
CALCIUM SERPL-MCNC: 7.7 MG/DL — LOW (ref 8.6–10.2)
CHLORIDE SERPL-SCNC: 88 MMOL/L — LOW (ref 98–107)
CO2 SERPL-SCNC: 30 MMOL/L — HIGH (ref 22–29)
CREAT SERPL-MCNC: 6.21 MG/DL — HIGH (ref 0.5–1.3)
EOSINOPHIL # BLD AUTO: 0 K/UL — SIGNIFICANT CHANGE UP (ref 0–0.5)
EOSINOPHIL NFR BLD AUTO: 0 % — SIGNIFICANT CHANGE UP (ref 0–6)
GLUCOSE BLDC GLUCOMTR-MCNC: 190 MG/DL — HIGH (ref 70–99)
GLUCOSE SERPL-MCNC: 167 MG/DL — HIGH (ref 70–115)
HCT VFR BLD CALC: 29.2 % — LOW (ref 39–50)
HGB BLD-MCNC: 9 G/DL — LOW (ref 13–17)
HYPOCHROMIA BLD QL: SLIGHT — SIGNIFICANT CHANGE UP
INR BLD: 1.13 RATIO — SIGNIFICANT CHANGE UP (ref 0.88–1.16)
LYMPHOCYTES # BLD AUTO: 14.8 % — SIGNIFICANT CHANGE UP (ref 13–44)
LYMPHOCYTES # BLD AUTO: 2.48 K/UL — SIGNIFICANT CHANGE UP (ref 1–3.3)
MACROCYTES BLD QL: SLIGHT — SIGNIFICANT CHANGE UP
MANUAL SMEAR VERIFICATION: SIGNIFICANT CHANGE UP
MCHC RBC-ENTMCNC: 27.1 PG — SIGNIFICANT CHANGE UP (ref 27–34)
MCHC RBC-ENTMCNC: 30.8 GM/DL — LOW (ref 32–36)
MCV RBC AUTO: 88 FL — SIGNIFICANT CHANGE UP (ref 80–100)
MICROCYTES BLD QL: SLIGHT — SIGNIFICANT CHANGE UP
MONOCYTES # BLD AUTO: 1.31 K/UL — HIGH (ref 0–0.9)
MONOCYTES NFR BLD AUTO: 7.8 % — SIGNIFICANT CHANGE UP (ref 2–14)
NEUTROPHILS # BLD AUTO: 12.96 K/UL — HIGH (ref 1.8–7.4)
NEUTROPHILS NFR BLD AUTO: 77.4 % — HIGH (ref 43–77)
OVALOCYTES BLD QL SMEAR: SLIGHT — SIGNIFICANT CHANGE UP
PLAT MORPH BLD: NORMAL — SIGNIFICANT CHANGE UP
PLATELET # BLD AUTO: 369 K/UL — SIGNIFICANT CHANGE UP (ref 150–400)
POLYCHROMASIA BLD QL SMEAR: SIGNIFICANT CHANGE UP
POTASSIUM SERPL-MCNC: 4.2 MMOL/L — SIGNIFICANT CHANGE UP (ref 3.5–5.3)
POTASSIUM SERPL-SCNC: 4.2 MMOL/L — SIGNIFICANT CHANGE UP (ref 3.5–5.3)
PROT SERPL-MCNC: 7.4 G/DL — SIGNIFICANT CHANGE UP (ref 6.6–8.7)
PROTHROM AB SERPL-ACNC: 13.1 SEC — HIGH (ref 10–12.9)
RBC # BLD: 3.32 M/UL — LOW (ref 4.2–5.8)
RBC # FLD: 16 % — HIGH (ref 10.3–14.5)
RBC BLD AUTO: ABNORMAL
SCHISTOCYTES BLD QL AUTO: SLIGHT — SIGNIFICANT CHANGE UP
SODIUM SERPL-SCNC: 133 MMOL/L — LOW (ref 135–145)
STOMATOCYTES BLD QL SMEAR: SLIGHT — SIGNIFICANT CHANGE UP
WBC # BLD: 16.74 K/UL — HIGH (ref 3.8–10.5)
WBC # FLD AUTO: 16.74 K/UL — HIGH (ref 3.8–10.5)

## 2019-09-23 PROCEDURE — 71045 X-RAY EXAM CHEST 1 VIEW: CPT | Mod: 26

## 2019-09-23 PROCEDURE — 99285 EMERGENCY DEPT VISIT HI MDM: CPT

## 2019-09-23 PROCEDURE — 90937 HEMODIALYSIS REPEATED EVAL: CPT

## 2019-09-23 PROCEDURE — 99223 1ST HOSP IP/OBS HIGH 75: CPT | Mod: GC,25

## 2019-09-23 RX ORDER — SODIUM CHLORIDE 9 MG/ML
1000 INJECTION, SOLUTION INTRAVENOUS
Refills: 0 | Status: DISCONTINUED | OUTPATIENT
Start: 2019-09-23 | End: 2019-09-26

## 2019-09-23 RX ORDER — DOCUSATE SODIUM 100 MG
100 CAPSULE ORAL THREE TIMES A DAY
Refills: 0 | Status: DISCONTINUED | OUTPATIENT
Start: 2019-09-23 | End: 2019-09-26

## 2019-09-23 RX ORDER — METOPROLOL TARTRATE 50 MG
25 TABLET ORAL THREE TIMES A DAY
Refills: 0 | Status: DISCONTINUED | OUTPATIENT
Start: 2019-09-23 | End: 2019-09-26

## 2019-09-23 RX ORDER — DEXTROSE 50 % IN WATER 50 %
12.5 SYRINGE (ML) INTRAVENOUS ONCE
Refills: 0 | Status: DISCONTINUED | OUTPATIENT
Start: 2019-09-23 | End: 2019-09-26

## 2019-09-23 RX ORDER — ASPIRIN/CALCIUM CARB/MAGNESIUM 324 MG
325 TABLET ORAL DAILY
Refills: 0 | Status: DISCONTINUED | OUTPATIENT
Start: 2019-09-23 | End: 2019-09-26

## 2019-09-23 RX ORDER — ACETAMINOPHEN 500 MG
650 TABLET ORAL EVERY 6 HOURS
Refills: 0 | Status: DISCONTINUED | OUTPATIENT
Start: 2019-09-23 | End: 2019-09-26

## 2019-09-23 RX ORDER — DEXTROSE 50 % IN WATER 50 %
15 SYRINGE (ML) INTRAVENOUS ONCE
Refills: 0 | Status: DISCONTINUED | OUTPATIENT
Start: 2019-09-23 | End: 2019-09-26

## 2019-09-23 RX ORDER — CALCIUM CARBONATE 500(1250)
1 TABLET ORAL
Refills: 0 | Status: DISCONTINUED | OUTPATIENT
Start: 2019-09-23 | End: 2019-09-26

## 2019-09-23 RX ORDER — ATORVASTATIN CALCIUM 80 MG/1
40 TABLET, FILM COATED ORAL AT BEDTIME
Refills: 0 | Status: DISCONTINUED | OUTPATIENT
Start: 2019-09-23 | End: 2019-09-26

## 2019-09-23 RX ORDER — VANCOMYCIN HCL 1 G
VIAL (EA) INTRAVENOUS
Refills: 0 | Status: DISCONTINUED | OUTPATIENT
Start: 2019-09-23 | End: 2019-09-23

## 2019-09-23 RX ORDER — INFLUENZA VIRUS VACCINE 15; 15; 15; 15 UG/.5ML; UG/.5ML; UG/.5ML; UG/.5ML
0.5 SUSPENSION INTRAMUSCULAR ONCE
Refills: 0 | Status: COMPLETED | OUTPATIENT
Start: 2019-09-23 | End: 2019-09-23

## 2019-09-23 RX ORDER — CLOPIDOGREL BISULFATE 75 MG/1
75 TABLET, FILM COATED ORAL DAILY
Refills: 0 | Status: DISCONTINUED | OUTPATIENT
Start: 2019-09-24 | End: 2019-09-26

## 2019-09-23 RX ORDER — DEXTROSE 50 % IN WATER 50 %
25 SYRINGE (ML) INTRAVENOUS ONCE
Refills: 0 | Status: DISCONTINUED | OUTPATIENT
Start: 2019-09-23 | End: 2019-09-26

## 2019-09-23 RX ORDER — GLUCAGON INJECTION, SOLUTION 0.5 MG/.1ML
1 INJECTION, SOLUTION SUBCUTANEOUS ONCE
Refills: 0 | Status: DISCONTINUED | OUTPATIENT
Start: 2019-09-23 | End: 2019-09-26

## 2019-09-23 RX ORDER — VANCOMYCIN HCL 1 G
1500 VIAL (EA) INTRAVENOUS ONCE
Refills: 0 | Status: COMPLETED | OUTPATIENT
Start: 2019-09-23 | End: 2019-09-23

## 2019-09-23 RX ORDER — INSULIN LISPRO 100/ML
VIAL (ML) SUBCUTANEOUS
Refills: 0 | Status: DISCONTINUED | OUTPATIENT
Start: 2019-09-23 | End: 2019-09-26

## 2019-09-23 RX ORDER — SODIUM CHLORIDE 9 MG/ML
3 INJECTION INTRAMUSCULAR; INTRAVENOUS; SUBCUTANEOUS EVERY 8 HOURS
Refills: 0 | Status: DISCONTINUED | OUTPATIENT
Start: 2019-09-23 | End: 2019-09-26

## 2019-09-23 RX ORDER — INSULIN GLARGINE 100 [IU]/ML
10 INJECTION, SOLUTION SUBCUTANEOUS AT BEDTIME
Refills: 0 | Status: DISCONTINUED | OUTPATIENT
Start: 2019-09-24 | End: 2019-09-26

## 2019-09-23 RX ORDER — OXYCODONE AND ACETAMINOPHEN 5; 325 MG/1; MG/1
1 TABLET ORAL EVERY 4 HOURS
Refills: 0 | Status: DISCONTINUED | OUTPATIENT
Start: 2019-09-23 | End: 2019-09-26

## 2019-09-23 RX ORDER — INSULIN GLARGINE 100 [IU]/ML
10 INJECTION, SOLUTION SUBCUTANEOUS ONCE
Refills: 0 | Status: COMPLETED | OUTPATIENT
Start: 2019-09-23 | End: 2019-09-23

## 2019-09-23 RX ADMIN — Medication 300 MILLIGRAM(S): at 20:27

## 2019-09-23 RX ADMIN — Medication 100 MILLIGRAM(S): at 21:16

## 2019-09-23 RX ADMIN — Medication 1 TABLET(S): at 21:16

## 2019-09-23 RX ADMIN — Medication 25 MILLIGRAM(S): at 21:16

## 2019-09-23 RX ADMIN — ATORVASTATIN CALCIUM 40 MILLIGRAM(S): 80 TABLET, FILM COATED ORAL at 21:16

## 2019-09-23 NOTE — ED ADULT NURSE NOTE - OBJECTIVE STATEMENT
pt reports presenting to the ED due to nausea and vomiting; also reports "oozing" to his incision site; pt s/p CABG several weeks ago; + purulent drainage noted to lower portion of incision line

## 2019-09-23 NOTE — ED STATDOCS - SKIN COLOR, MLM
pinpoint opening at interior aspect of mid sternal incision without active drainage, no surrounding erythema

## 2019-09-23 NOTE — CONSULT NOTE ADULT - ASSESSMENT
1) ESRD on HD  2) MBD Of renal dx  3) Anemia of renal dx  4) Vol / HTN    Plan for dialysis today  3 hours; 1-2kg as tolerated UF  Check daily phosphorus  May require binder; renagel 800mg TID  Epo on HD if HGB < 10  Will follow  d/w 4 TWR CTU team

## 2019-09-23 NOTE — ED CLERICAL - NS ED CLERK NOTE PRE-ARRIVAL INFORMATION; ADDITIONAL PRE-ARRIVAL INFORMATION
This patient is enrolled in the Follow Your Heart program and has undergone a cardiac surgery procedure and has active care navigation. This patient can be followed up by the care navigation team within 24 hours. To arrange close follow-up or to obtain additional clinical information about this patient, please call the contact number above. Please call the cardiac surgery team once patient is registered at [phone] for consultation PRIOR to disposition decision.  The patient recently underwent a cardiac surgery procedure and the team can assist in acute medical management.

## 2019-09-23 NOTE — ED STATDOCS - OBJECTIVE STATEMENT
58 y/o M pt with PMHx of CABG, DM2, HTN, HLD presents to the ED c/o multiple medical complaints. Patient had a CABG 6 days ago here at [facility]. Reports drainage from wound beginning that day, and still occurring. Patient reports 2 days of vomiting and 1 day of YEVGENIY ankle swelling. Receives dialysis M/W/F. Denies fever, difficulty breathing. 60 y/o M pt with PMHx of CABG, DM2, HTN, HLD presents to the ED c/o multiple medical complaints. Patient had a CABG 6 days ago here at [facility]. Reports "oozing" from wound since discharge last wednesday. Has chest soreness but no severe pain.  Denies fever.  Denies cough or diff breathing.  Patient reports 2 days of vomiting and 1 day of YEVGENIY ankle swelling. Receives dialysis M/W/F.

## 2019-09-23 NOTE — PATIENT PROFILE ADULT - NSPROPOAURINARYCATHETER_GEN_A_NUR
Patient was in the ER and would like to speak to nurse since he is still soar and wast told to return to work tomorrow.     [phone] - patient
Patient will call [facility] to see if he can go in today if not will call back to be seen here today
no

## 2019-09-23 NOTE — ED STATDOCS - CARE PLAN
Principal Discharge DX:	Post op infection Principal Discharge DX:	Post op infection  Secondary Diagnosis:	ESRD (end stage renal disease) on dialysis

## 2019-09-23 NOTE — ED ADULT TRIAGE NOTE - CHIEF COMPLAINT QUOTE
Pt recently dc'd from [facility] for CABGx3 last week. Today pt presents for c/o vomiting, b/l LE swelling and "oozing from surgical site". Pt needs dialysis today.

## 2019-09-23 NOTE — H&P ADULT - NSHPPHYSICALEXAM_GEN_ALL_CORE
Physical Exam:     GENERAL: no acute distress  ENT: EOMI, PERRLA, conjunctiva and sclera clear, Neck supple, No JVD, moist mucosa, no pharyngeal erythema, no tonsillar enlargement or exudate  CHEST/LUNG: Clear to auscultation bilaterally; No wheeze, no rhonchi or rales, no crackles, equal breath sounds bilaterally   HEART: Regular rate and rhythm; No murmurs, rubs, or gallops  ABDOMEN: Soft, Nontender, Nondistended; Bowel sounds present, no organomegaly  EXTREMITIES:  No clubbing, cyanosis, or edema  PSYCH: Nl behavior, nl affect  NEUROLOGY: AAOx3, non-focal, cranial nerves intact  SKIN: mid pole sternum with non proximated edges, purulent drainage lower pole> culture x 2 sent for C/S

## 2019-09-23 NOTE — ED ADULT NURSE NOTE - NSIMPLEMENTINTERV_GEN_ALL_ED
Implemented All Fall Risk Interventions:  [place] to call system. Call bell, personal items and telephone within reach. Instruct patient to call for assistance. Room bathroom lighting operational. Non-slip footwear when patient is off stretcher. Physically safe environment: no spills, clutter or unnecessary equipment. Stretcher in lowest position, wheels locked, appropriate side rails in place. Provide visual cue, wrist band, yellow gown, etc. Monitor gait and stability. Monitor for mental status changes and reorient to person, place, and time. Review medications for side effects contributing to fall risk. Reinforce activity limits and safety measures with patient and family.

## 2019-09-23 NOTE — PHARMACOTHERAPY INTERVENTION NOTE - COMMENTS
Pharmacy-Directed Vancomycin Dosing  Patient presenting s/p CABG at [facility] last week with drainage at surgical site today. Loading dose of 1,500 mG STAT x 1 ordered. Patient ESRD on HD MWF, due for dialysis today. Spoke to NP, will need additional dose of vancomycin after HD today.

## 2019-09-23 NOTE — ED ADULT NURSE REASSESSMENT NOTE - NS ED NURSE REASSESS COMMENT FT1
please note pt with dialysis catheter to right side of chest wall; left arm also pink banded for dialysis; + bruit/thrill to left fistula

## 2019-09-23 NOTE — H&P ADULT - ASSESSMENT
58yo Male known to our service with a PMHx DM (HA1c 8.4), HTN, HLD, prior CVA, sciatica, and former smoker,  acute on chronic CKD, new to HD (M / W/ F) requiring R chest permacath,  NSTEMI ,  LUE AVF placement 8/26. S/P  C3L on 9/13 with Dr Mims . uneventful post op course and was  discharged on 9/18.  Called CTS office this am stating he has had frequent vomiting 2-3 times daily x 72 hours Denied fever,diarrhea,rigors or sore throat  Neg sick contacts  When examined in ER pt stable hemodynamics, neg nausea currently, due for HD today> renal notified  On exam lower pole sternum with purulent drainage noted  No click C/S swab x 2 obtained  Vanco x 1

## 2019-09-23 NOTE — CONSULT NOTE ADULT - SUBJECTIVE AND OBJECTIVE BOX
Health system DIVISION OF KIDNEY DISEASES AND HYPERTENSION -- INITIAL CONSULT NOTE  --------------------------------------------------------------------------------  HPI:  59M history of smoking, DM2, HTN, HLD, CVA, CABG last week, ESRD on HD; due for dialysis; followed by us on previous admission with CTS.    Prev:  58y/o  AA M, active smoker, with PMHx of DM2, HTN, HLD, prior CVA (Poor historian-Unclear of time, Reports no sensory/motor deficits), sciatica (Intermit Lt lower ext numbness), history of medication non-compliance presents as a transfer from [facility] for CTS evaluation for possible CABG, under care of Dr. Mims. Pt presented to [facility] ED on 8/17/19 with sudden onset of SOB with concurrent chest discomfort while performing yard work. Reports this is the first occurrence of these symptoms and without identifiable provoking/relieving factors. In [facility] ED pt found to be HTN-'s, 's (Treated with Labetalol, hydralazine, and procardia), with  signs of end organ damage: Creatinine 4.5 (Baseline 2018 Cr: 2.2), elevated transaminase (ALT:70, AST:95), Trop 0.123, BNP 22,000. CXR: + increased pulm vascular congestion, ECHO: EF 35% with LVH, Mild/Mod MR, mild TR, mild AI, EKG: +ST/T-wave abnormality suggesting inferior & Anterolateral ischemia, Lower ext doppler: NEG DVT, Renal US consistent with renal disease (Pt has no known Hx of renal disease). 8/19 Continued Cr elevation (4.9-5.8) requiring HD (8/19 Rt TDC Permacath). 8/20: Cardiac cath (Held for Cr level optimization): +MVD showing dLAD 90%, D1 80%, pCXF 70%, mCXF 70%, OM1 80%, mRCA, 95%, RPDLS 70%.   Pt s/p CABG    PAST HISTORY  --------------------------------------------------------------------------------  PAST MEDICAL & SURGICAL HISTORY:  HLD (hyperlipidemia)  Cerebrovascular accident (CVA): Poor historian, unaware of date  DM2 (diabetes mellitus, type 2)  HTN (hypertension)  No significant past surgical history    FAMILY HISTORY:  FH: HTN (hypertension)  FH: CAD (coronary artery disease)    PAST SOCIAL HISTORY:    ALLERGIES & MEDICATIONS  --------------------------------------------------------------------------------  Allergies    lactose (Flatulence (Severe); Diarrhea (Severe); Stomach Upset (Severe); Nausea (Severe))  No Known Drug Allergies    Intolerances    OHS (Unknown)    Standing Inpatient Medications  vancomycin  IVPB        PRN Inpatient Medications      REVIEW OF SYSTEMS  --------------------------------------------------------------------------------  Gen: No weight changes, fatigue, fevers/chills, weakness  Skin: No rashes  Head/Eyes/Ears/Mouth: No headache; Normal hearing; Normal vision w/o blurriness; No sinus pain/discomfort, sore throat  Respiratory: No dyspnea, cough, wheezing, hemoptysis  CV: No chest pain, PND, orthopnea  GI: No abdominal pain, diarrhea, constipation, nausea, vomiting, melena, hematochezia  : No increased frequency, dysuria, hematuria, nocturia  MSK: No joint pain/swelling; no back pain; no edema  Neuro: No dizziness/lightheadedness, weakness, seizures, numbness, tingling  Heme: No easy bruising or bleeding  Endo: No heat/cold intolerance  Psych: No significant nervousness, anxiety, stress, depression    All other systems were reviewed and are negative, except as noted.    VITALS/PHYSICAL EXAM  --------------------------------------------------------------------------------  T(C): 37.3 (09-23-19 @ 12:10), Max: 37.3 (09-23-19 @ 12:10)  HR: 82 (09-23-19 @ 12:10) (82 - 82)  BP: 126/50 (09-23-19 @ 12:10) (126/50 - 126/50)  RR: 18 (09-23-19 @ 12:10) (18 - 18)  SpO2: 96% (09-23-19 @ 12:10) (96% - 96%)  Wt(kg): --  Height (cm): 165.1 (09-23-19 @ 12:10)  Weight (kg): 93 (09-23-19 @ 12:10)  BMI (kg/m2): 34.1 (09-23-19 @ 12:10)  BSA (m2): 2 (09-23-19 @ 12:10)      Physical Exam:  	Gen: NAD  	HEENT: PERRL, supple neck, clear oropharynx  	Pulm: dec BS  	CV: sternal wound  	Back: No spinal or CVA tenderness; no sacral edema  	Abd: +BS, soft, nontender/nondistended  	: No suprapubic tenderness  	UE: Warm, FROM, no clubbing, intact strength; no edema; no asterixis  	LE: Warm, FROM, no clubbing, intact strength; +edema  	Neuro: No focal deficits, intact gait  	Psych: Normal affect and mood  	Skin: Warm, without rashes    LABS/STUDIES  --------------------------------------------------------------------------------                Creatinine Trend:  SCr 4.20 [09-18 @ 06:14]  SCr 3.77 [09-17 @ 06:09]  SCr 4.75 [09-16 @ 04:29]  SCr 3.77 [09-15 @ 02:50]  SCr 3.07 [09-14 @ 17:09]    Urinalysis - [08-20-19 @ 13:32]      Color Yellow / Appearance Clear / SG 1.005 / pH 7.0      Gluc Negative / Ketone Negative  / Bili Negative / Urobili Negative       Blood Small / Protein 100 / Leuk Est Negative / Nitrite Negative      RBC 3-5 / WBC 0-2 / Hyaline  / Gran  / Sq Epi  / Non Sq Epi Occasional / Bacteria Occasional      Iron 37, TIBC 204, %sat 18      [08-20-19 @ 15:20]  Ferritin 305      [08-20-19 @ 15:20]  PTH -- (Ca 6.9)      [08-20-19 @ 23:33]   456  Vitamin D (25OH) 11.3      [08-21-19 @ 00:00]  HbA1c 8.4      [08-20-19 @ 13:09]  TSH 1.97      [08-20-19 @ 13:09]  Lipid: chol 209, , HDL 32,       [08-20-19 @ 13:09]    HBsAb <3.0      [08-21-19 @ 22:09]  HBsAg Nonreact      [08-21-19 @ 22:09]  HBcAb Nonreact      [08-21-19 @ 22:09]  HCV 0.12, Nonreact      [08-21-19 @ 17:20]

## 2019-09-23 NOTE — ED ADULT NURSE NOTE - CHIEF COMPLAINT QUOTE
Pt recently dc'd from [facility] for CABGx3 last week. Today pt presents for c/o vomiting, b/l LE swelling and "oozing from surgical site". Pt needs dialysis today.

## 2019-09-23 NOTE — H&P ADULT - HISTORY OF PRESENT ILLNESS
60yo Male known to our service with a PMHx DM (HA1c 8.4), HTN, HLD, prior CVA, sciatica, and former smoker,  acute on chronic CKD, new to HD (M / W/ F) requiring R chest permacath,  NSTEMI ,  LUE AVF placement 8/26. S/P  C3L on 9/13 with Dr Mims . uneventful post op course and was  discharged on 9/18.  Called CTS office this am stating he has had frequent vomiting 2-3 times daily x 72 hours Denied fever,diarrhea,rigors or sore throat  Neg sick contacts  When examined in ER pt stable hemodynamics, neg nausea currently, due for HD today> renal notified  On exam lower pole sternum with purulent drainage noted  No click C/S swab x 2 obtained

## 2019-09-23 NOTE — H&P ADULT - NSHPREVIEWOFSYSTEMS_GEN_ALL_CORE
General:  no weakness, fatigue, fevers or chills  Skin: no itching, burning, rashes, or lesions   HEENT: no visual changes;  no headache, no vertigo, no recent colds, nasal stuffiness or sore throat   Neck: no pain, stiffness or swollen glands  Respiratory: no cough, sputum, wheezing, hemoptysis; no shortness of breath  Cardiovascular: no chest pain, dyspnea or palpitations  GI: Reports "Upset stomach" +  nausea, + vomiting   Neg diarrhea  : ESRD  on HD  M/W/F  Peripheral Vascular: no intermittent claudication, leg cramps, varicose veins, swelling or swelling with redness and tenderness  Neuro: no changes in orientation, memory, insight or judgment, no changes in mood, attention or speech.  no dizziness, vertigo or fainting, numbness, tingling or weakness

## 2019-09-23 NOTE — H&P ADULT - NSICDXPASTMEDICALHX_GEN_ALL_CORE_FT
PAST MEDICAL HISTORY:  Cerebrovascular accident (CVA) Poor historian, unaware of date    DM2 (diabetes mellitus, type 2)     HLD (hyperlipidemia)     HTN (hypertension) PAST MEDICAL HISTORY:  Cerebrovascular accident (CVA) Poor historian, unaware of date    DM2 (diabetes mellitus, type 2)     HLD (hyperlipidemia)     HTN (hypertension)     S/P arteriovenous (AV) fistula creation     S/P CABG x 3

## 2019-09-24 DIAGNOSIS — Z29.9 ENCOUNTER FOR PROPHYLACTIC MEASURES, UNSPECIFIED: ICD-10-CM

## 2019-09-24 DIAGNOSIS — I10 ESSENTIAL (PRIMARY) HYPERTENSION: ICD-10-CM

## 2019-09-24 DIAGNOSIS — E11.9 TYPE 2 DIABETES MELLITUS WITHOUT COMPLICATIONS: ICD-10-CM

## 2019-09-24 DIAGNOSIS — E78.5 HYPERLIPIDEMIA, UNSPECIFIED: ICD-10-CM

## 2019-09-24 DIAGNOSIS — Z71.89 OTHER SPECIFIED COUNSELING: ICD-10-CM

## 2019-09-24 DIAGNOSIS — L08.9 LOCAL INFECTION OF THE SKIN AND SUBCUTANEOUS TISSUE, UNSPECIFIED: ICD-10-CM

## 2019-09-24 LAB
AMYLASE P1 CFR SERPL: 51 U/L — SIGNIFICANT CHANGE UP (ref 36–128)
ANION GAP SERPL CALC-SCNC: 9 MMOL/L — SIGNIFICANT CHANGE UP (ref 5–17)
BUN SERPL-MCNC: 32 MG/DL — HIGH (ref 8–20)
CALCIUM SERPL-MCNC: 8 MG/DL — LOW (ref 8.6–10.2)
CHLORIDE SERPL-SCNC: 96 MMOL/L — LOW (ref 98–107)
CO2 SERPL-SCNC: 29 MMOL/L — SIGNIFICANT CHANGE UP (ref 22–29)
CREAT SERPL-MCNC: 4.26 MG/DL — HIGH (ref 0.5–1.3)
GLUCOSE BLDC GLUCOMTR-MCNC: 116 MG/DL — HIGH (ref 70–99)
GLUCOSE BLDC GLUCOMTR-MCNC: 127 MG/DL — HIGH (ref 70–99)
GLUCOSE BLDC GLUCOMTR-MCNC: 157 MG/DL — HIGH (ref 70–99)
GLUCOSE BLDC GLUCOMTR-MCNC: 160 MG/DL — HIGH (ref 70–99)
GLUCOSE BLDC GLUCOMTR-MCNC: 174 MG/DL — HIGH (ref 70–99)
GLUCOSE SERPL-MCNC: 117 MG/DL — HIGH (ref 70–115)
HCT VFR BLD CALC: 28.4 % — LOW (ref 39–50)
HGB BLD-MCNC: 8.8 G/DL — LOW (ref 13–17)
LIDOCAIN IGE QN: 15 U/L — LOW (ref 22–51)
MAGNESIUM SERPL-MCNC: 1.9 MG/DL — SIGNIFICANT CHANGE UP (ref 1.8–2.6)
MCHC RBC-ENTMCNC: 27.2 PG — SIGNIFICANT CHANGE UP (ref 27–34)
MCHC RBC-ENTMCNC: 31 GM/DL — LOW (ref 32–36)
MCV RBC AUTO: 87.9 FL — SIGNIFICANT CHANGE UP (ref 80–100)
PLATELET # BLD AUTO: 329 K/UL — SIGNIFICANT CHANGE UP (ref 150–400)
POTASSIUM SERPL-MCNC: 4.2 MMOL/L — SIGNIFICANT CHANGE UP (ref 3.5–5.3)
POTASSIUM SERPL-SCNC: 4.2 MMOL/L — SIGNIFICANT CHANGE UP (ref 3.5–5.3)
RBC # BLD: 3.23 M/UL — LOW (ref 4.2–5.8)
RBC # FLD: 15.9 % — HIGH (ref 10.3–14.5)
SODIUM SERPL-SCNC: 134 MMOL/L — LOW (ref 135–145)
WBC # BLD: 15.8 K/UL — HIGH (ref 3.8–10.5)
WBC # FLD AUTO: 15.8 K/UL — HIGH (ref 3.8–10.5)

## 2019-09-24 PROCEDURE — 99222 1ST HOSP IP/OBS MODERATE 55: CPT

## 2019-09-24 PROCEDURE — 71250 CT THORAX DX C-: CPT | Mod: 26

## 2019-09-24 PROCEDURE — 99024 POSTOP FOLLOW-UP VISIT: CPT

## 2019-09-24 PROCEDURE — 99233 SBSQ HOSP IP/OBS HIGH 50: CPT

## 2019-09-24 PROCEDURE — 71045 X-RAY EXAM CHEST 1 VIEW: CPT | Mod: 26

## 2019-09-24 RX ORDER — CEFEPIME 1 G/1
1000 INJECTION, POWDER, FOR SOLUTION INTRAMUSCULAR; INTRAVENOUS ONCE
Refills: 0 | Status: COMPLETED | OUTPATIENT
Start: 2019-09-24 | End: 2019-09-24

## 2019-09-24 RX ORDER — PIPERACILLIN AND TAZOBACTAM 4; .5 G/20ML; G/20ML
3.38 INJECTION, POWDER, LYOPHILIZED, FOR SOLUTION INTRAVENOUS EVERY 12 HOURS
Refills: 0 | Status: DISCONTINUED | OUTPATIENT
Start: 2019-09-24 | End: 2019-09-26

## 2019-09-24 RX ORDER — POVIDONE-IODINE 5 %
1 AEROSOL (ML) TOPICAL THREE TIMES A DAY
Refills: 0 | Status: DISCONTINUED | OUTPATIENT
Start: 2019-09-24 | End: 2019-09-26

## 2019-09-24 RX ORDER — MAGNESIUM SULFATE 500 MG/ML
2 VIAL (ML) INJECTION ONCE
Refills: 0 | Status: COMPLETED | OUTPATIENT
Start: 2019-09-24 | End: 2019-09-24

## 2019-09-24 RX ADMIN — ATORVASTATIN CALCIUM 40 MILLIGRAM(S): 80 TABLET, FILM COATED ORAL at 21:29

## 2019-09-24 RX ADMIN — Medication 1 APPLICATION(S): at 15:18

## 2019-09-24 RX ADMIN — CEFEPIME 100 MILLIGRAM(S): 1 INJECTION, POWDER, FOR SOLUTION INTRAMUSCULAR; INTRAVENOUS at 11:01

## 2019-09-24 RX ADMIN — PIPERACILLIN AND TAZOBACTAM 25 GRAM(S): 4; .5 INJECTION, POWDER, LYOPHILIZED, FOR SOLUTION INTRAVENOUS at 17:17

## 2019-09-24 RX ADMIN — SODIUM CHLORIDE 3 MILLILITER(S): 9 INJECTION INTRAMUSCULAR; INTRAVENOUS; SUBCUTANEOUS at 15:11

## 2019-09-24 RX ADMIN — CLOPIDOGREL BISULFATE 75 MILLIGRAM(S): 75 TABLET, FILM COATED ORAL at 15:18

## 2019-09-24 RX ADMIN — Medication 650 MILLIGRAM(S): at 02:00

## 2019-09-24 RX ADMIN — INSULIN GLARGINE 10 UNIT(S): 100 INJECTION, SOLUTION SUBCUTANEOUS at 21:29

## 2019-09-24 RX ADMIN — Medication 1 TABLET(S): at 17:17

## 2019-09-24 RX ADMIN — SODIUM CHLORIDE 3 MILLILITER(S): 9 INJECTION INTRAMUSCULAR; INTRAVENOUS; SUBCUTANEOUS at 21:24

## 2019-09-24 RX ADMIN — INSULIN GLARGINE 10 UNIT(S): 100 INJECTION, SOLUTION SUBCUTANEOUS at 00:29

## 2019-09-24 RX ADMIN — Medication 50 GRAM(S): at 10:28

## 2019-09-24 RX ADMIN — Medication 325 MILLIGRAM(S): at 15:18

## 2019-09-24 RX ADMIN — Medication 1: at 21:31

## 2019-09-24 RX ADMIN — Medication 100 MILLIGRAM(S): at 21:29

## 2019-09-24 RX ADMIN — SODIUM CHLORIDE 3 MILLILITER(S): 9 INJECTION INTRAMUSCULAR; INTRAVENOUS; SUBCUTANEOUS at 05:37

## 2019-09-24 RX ADMIN — Medication 650 MILLIGRAM(S): at 00:30

## 2019-09-24 RX ADMIN — Medication 1 TABLET(S): at 05:37

## 2019-09-24 RX ADMIN — Medication 100 MILLIGRAM(S): at 05:37

## 2019-09-24 RX ADMIN — Medication 25 MILLIGRAM(S): at 15:18

## 2019-09-24 RX ADMIN — Medication 25 MILLIGRAM(S): at 21:29

## 2019-09-24 RX ADMIN — Medication 25 MILLIGRAM(S): at 05:37

## 2019-09-24 RX ADMIN — Medication 1 APPLICATION(S): at 21:32

## 2019-09-24 NOTE — PHYSICAL THERAPY INITIAL EVALUATION ADULT - ADDITIONAL COMMENTS
Pt states he lives in a house with his girlfriend. There are 2 MONICA with a HR. No steps inside. Uses a cane and/or RW.

## 2019-09-24 NOTE — PROGRESS NOTE ADULT - ASSESSMENT
58yo Male known to our service with a PMHx DM (HA1c 8.4), HTN, HLD, prior CVA, sciatica, and former smoker,  acute on chronic CKD, new to HD (M / W/ F) requiring R chest permacath,  NSTEMI ,  LUE AVF placement 8/26. S/P  C3L on 9/13 with Dr Mims . uneventful post op course and was  discharged on 9/18.  Called CTS office yesterday stating he has had frequent vomiting 2-3 times daily x 72 hours Denied fever,diarrhea,rigors or sore throat  Neg sick contacts  When examined in ER pt stable hemodynamics, neg nausea currently, due for HD today> renal notified  On exam lower pole sternum with purulent drainage noted  No click C/S swab x 2 obtained  Vanco x 1

## 2019-09-24 NOTE — PHYSICAL THERAPY INITIAL EVALUATION ADULT - ACTIVE RANGE OF MOTION EXAMINATION, REHAB EVAL
bilateral  lower extremity Active ROM was WFL (within functional limits)/b/l shoulder flexion to 90deg 2/2 sternal precautions/bilateral upper extremity Active ROM was WFL (within functional limits)

## 2019-09-24 NOTE — PROGRESS NOTE ADULT - PROBLEM SELECTOR PLAN 1
ID consult called and pending.  Vanco x 1 given lastnight.  Cefipime ordered x 1 today per Dr. Landaverde.  Betadine QID to MSI per Dr. Mims. ID consult called and pending.  Vanco x 1 given lastnight.  Cefipime ordered x 1 today per Dr. Landaverde.  Betadine QID to MSI per Dr. Mims.  Ct chest without collection or sternal involvement.

## 2019-09-24 NOTE — PROGRESS NOTE ADULT - PROBLEM SELECTOR PLAN 7
SCD's for DVt prophylaxis.  Protonix for GI prophylaxis.    Discussed with Dr. Landaverde in AM rounds, as well as Dr. Mims.

## 2019-09-24 NOTE — CONSULT NOTE ADULT - SUBJECTIVE AND OBJECTIVE BOX
[facility]  INFECTIOUS DISEASES AND INTERNAL MEDICINE at [place]  =======================================================  Vazquez Jean MD  Diplomates American Board of Internal Medicine and Infectious Diseases  =======================================================    [id number]  KATELYN WALKER     CC: Sternal wound drainage     HPI:  60y/o man with PMH of DM2, HTN, stroke, ESRD on HD trough R chest permacath s/p AVF placement on 8/26 in lef arm, CAD s/p CABG on 9/13, was admitted with mid sternal wound discharge, nausea and vomiting. He states that he had minimal discharge from the beginning after surgery.   Since admission feels better, no more vomiting but had fever 101.8.     PAST MEDICAL & SURGICAL HISTORY:  S/P arteriovenous (AV) fistula creation  S/P CABG x 3  HLD (hyperlipidemia)  Cerebrovascular accident (CVA): Poor historian, unaware of date  DM2 (diabetes mellitus, type 2)  HTN (hypertension)  S/P CABG x 3    Social Hx: Exsmoker    FAMILY HISTORY:  FH: HTN (hypertension)  FH: CAD (coronary artery disease)    Allergies  lactose (Flatulence (Severe); Diarrhea (Severe); Stomach Upset (Severe); Nausea (Severe))  No Known Drug Allergies    Antibiotics:  noone     REVIEW OF SYSTEMS:  CONSTITUTIONAL:  No Fever or chills  HEENT:  No diplopia or blurred vision.  No sore throat or runny nose.  CARDIOVASCULAR:  No chest pain or SOB.  RESPIRATORY:  No cough, shortness of breath, PND or orthopnea.  GASTROINTESTINAL:  No nausea, vomiting or diarrhea.  GENITOURINARY:  No dysuria, frequency or urgency. No Blood in urine  MUSCULOSKELETAL:  no joint aches, no muscle pain  SKIN:  No change in skin, hair or nails.  NEUROLOGIC:  No paresthesias, fasciculations, seizures or weakness.  PSYCHIATRIC:  No disorder of thought or mood.  ENDOCRINE:  No heat or cold intolerance, polyuria or polydipsia.  HEMATOLOGICAL:  No easy bruising or bleeding.     Physical Exam:  Vital Signs Last 24 Hrs  T(C): 36.9 (24 Sep 2019 09:04), Max: 38.8 (23 Sep 2019 23:51)  T(F): 98.5 (24 Sep 2019 09:04), Max: 101.8 (23 Sep 2019 23:51)  HR: 73 (24 Sep 2019 09:04) (70 - 102)  BP: 128/78 (24 Sep 2019 09:04) (114/65 - 157/50)  RR: 18 (24 Sep 2019 09:04) (16 - 18)  SpO2: 96% (24 Sep 2019 09:04) (92% - 98%)  Height (cm): 165.1 (09-23 @ 20:47)  Weight (kg): 87.3 (09-23 @ 20:47)  BMI (kg/m2): 32 (09-23 @ 20:47)  BSA (m2): 1.95 (09-23 @ 20:47)  GEN: NAD  HEENT: normocephalic and atraumatic. EOMI. PERRL.    NECK: Supple.  No lymphadenopathy   LUNGS: Clear to auscultation. Midsternal chest with healing wound, midpart with minimal opening and clear discharge  HEART: irregular rate and rhythm 2/6 systolic murmur.  ABDOMEN: Soft, nontender, and nondistended.  Positive bowel sounds.    : No CVA tenderness  EXTREMITIES: left arm nontender AVF site, R chest permacath nontender.   PSYCHIATRIC: Appropriate affect .  SKIN: as above     Labs:  09-24    134<L>  |  96<L>  |  32.0<H>  ----------------------------<  117<H>  4.2   |  29.0  |  4.26<H>    Ca    8.0<L>      24 Sep 2019 06:01  Mg     1.9     09-24    TPro  7.4  /  Alb  3.9  /  TBili  0.5  /  DBili  x   /  AST  14  /  ALT  14  /  AlkPhos  93  09-23                        8.8    15.80 )-----------( 329      ( 24 Sep 2019 06:01 )             28.4     PT/INR - ( 23 Sep 2019 14:42 )   PT: 13.1 sec;   INR: 1.13 ratio    PTT - ( 23 Sep 2019 14:42 )  PTT:27.4 sec    LIVER FUNCTIONS - ( 23 Sep 2019 14:42 )  Alb: 3.9 g/dL / Pro: 7.4 g/dL / ALK PHOS: 93 U/L / ALT: 14 U/L / AST: 14 U/L / GGT: x           RECENT CULTURES:  09-23 @ 13:56 Skin     Few Coag Negative Staphylococcus    All imaging and other data have been reviewed.    Assessment and Plan:   60y/o man with PMH of DM2, HTN, stroke, ESRD on HD trough R chest permacath s/p AVF placement on 8/26 in lef arm, CAD s/p CABG on 9/13, was admitted with mid sternal wound discharge, fever, nausea and vomiting.    Wound infection  Fever  Leukocytosis  ESRD   DM2    - Blood culture x 2   - Wound culture looks like CoNS, most likely skin dilma  - WBC 15k, trend it   - Chest CT to see if there is any collection or deep infection.   - Left fistula and permacath look fine for now.   - Start Vancomycin 1gm after HD  - Start zosyn 3.375gm q12h   - based on culture results will switch antibiotics.     Will follow.

## 2019-09-25 LAB
ALBUMIN SERPL ELPH-MCNC: 2.9 G/DL — LOW (ref 3.3–5.2)
ALP SERPL-CCNC: 73 U/L — SIGNIFICANT CHANGE UP (ref 40–120)
ALT FLD-CCNC: 16 U/L — SIGNIFICANT CHANGE UP
ANION GAP SERPL CALC-SCNC: 13 MMOL/L — SIGNIFICANT CHANGE UP (ref 5–17)
AST SERPL-CCNC: 15 U/L — SIGNIFICANT CHANGE UP
BILIRUB SERPL-MCNC: 0.6 MG/DL — SIGNIFICANT CHANGE UP (ref 0.4–2)
BUN SERPL-MCNC: 42 MG/DL — HIGH (ref 8–20)
CALCIUM SERPL-MCNC: 7.9 MG/DL — LOW (ref 8.6–10.2)
CHLORIDE SERPL-SCNC: 93 MMOL/L — LOW (ref 98–107)
CO2 SERPL-SCNC: 28 MMOL/L — SIGNIFICANT CHANGE UP (ref 22–29)
CREAT SERPL-MCNC: 5.18 MG/DL — HIGH (ref 0.5–1.3)
CULTURE RESULTS: SIGNIFICANT CHANGE UP
GLUCOSE BLDC GLUCOMTR-MCNC: 110 MG/DL — HIGH (ref 70–99)
GLUCOSE BLDC GLUCOMTR-MCNC: 117 MG/DL — HIGH (ref 70–99)
GLUCOSE BLDC GLUCOMTR-MCNC: 124 MG/DL — HIGH (ref 70–99)
GLUCOSE BLDC GLUCOMTR-MCNC: 132 MG/DL — HIGH (ref 70–99)
GLUCOSE BLDC GLUCOMTR-MCNC: 85 MG/DL — SIGNIFICANT CHANGE UP (ref 70–99)
GLUCOSE SERPL-MCNC: 90 MG/DL — SIGNIFICANT CHANGE UP (ref 70–115)
HCT VFR BLD CALC: 25.9 % — LOW (ref 39–50)
HGB BLD-MCNC: 8.1 G/DL — LOW (ref 13–17)
MAGNESIUM SERPL-MCNC: 1.9 MG/DL — SIGNIFICANT CHANGE UP (ref 1.6–2.6)
MCHC RBC-ENTMCNC: 27 PG — SIGNIFICANT CHANGE UP (ref 27–34)
MCHC RBC-ENTMCNC: 31.3 GM/DL — LOW (ref 32–36)
MCV RBC AUTO: 86.3 FL — SIGNIFICANT CHANGE UP (ref 80–100)
PHOSPHATE SERPL-MCNC: 3.8 MG/DL — SIGNIFICANT CHANGE UP (ref 2.4–4.7)
PLATELET # BLD AUTO: 332 K/UL — SIGNIFICANT CHANGE UP (ref 150–400)
POTASSIUM SERPL-MCNC: 4.2 MMOL/L — SIGNIFICANT CHANGE UP (ref 3.5–5.3)
POTASSIUM SERPL-SCNC: 4.2 MMOL/L — SIGNIFICANT CHANGE UP (ref 3.5–5.3)
PROT SERPL-MCNC: 5.9 G/DL — LOW (ref 6.6–8.7)
RBC # BLD: 3 M/UL — LOW (ref 4.2–5.8)
RBC # FLD: 15.4 % — HIGH (ref 10.3–14.5)
SODIUM SERPL-SCNC: 134 MMOL/L — LOW (ref 135–145)
SPECIMEN SOURCE: SIGNIFICANT CHANGE UP
WBC # BLD: 12.31 K/UL — HIGH (ref 3.8–10.5)
WBC # FLD AUTO: 12.31 K/UL — HIGH (ref 3.8–10.5)

## 2019-09-25 PROCEDURE — 90937 HEMODIALYSIS REPEATED EVAL: CPT

## 2019-09-25 PROCEDURE — 71045 X-RAY EXAM CHEST 1 VIEW: CPT | Mod: 26

## 2019-09-25 PROCEDURE — 99024 POSTOP FOLLOW-UP VISIT: CPT

## 2019-09-25 PROCEDURE — 99232 SBSQ HOSP IP/OBS MODERATE 35: CPT

## 2019-09-25 RX ORDER — VANCOMYCIN HCL 1 G
1000 VIAL (EA) INTRAVENOUS ONCE
Refills: 0 | Status: COMPLETED | OUTPATIENT
Start: 2019-09-25 | End: 2019-09-26

## 2019-09-25 RX ADMIN — SODIUM CHLORIDE 3 MILLILITER(S): 9 INJECTION INTRAMUSCULAR; INTRAVENOUS; SUBCUTANEOUS at 13:27

## 2019-09-25 RX ADMIN — Medication 1 APPLICATION(S): at 14:57

## 2019-09-25 RX ADMIN — Medication 25 MILLIGRAM(S): at 06:22

## 2019-09-25 RX ADMIN — CLOPIDOGREL BISULFATE 75 MILLIGRAM(S): 75 TABLET, FILM COATED ORAL at 20:49

## 2019-09-25 RX ADMIN — Medication 1 TABLET(S): at 20:49

## 2019-09-25 RX ADMIN — Medication 1 TABLET(S): at 06:22

## 2019-09-25 RX ADMIN — Medication 1 APPLICATION(S): at 06:24

## 2019-09-25 RX ADMIN — PIPERACILLIN AND TAZOBACTAM 25 GRAM(S): 4; .5 INJECTION, POWDER, LYOPHILIZED, FOR SOLUTION INTRAVENOUS at 06:22

## 2019-09-25 RX ADMIN — PIPERACILLIN AND TAZOBACTAM 25 GRAM(S): 4; .5 INJECTION, POWDER, LYOPHILIZED, FOR SOLUTION INTRAVENOUS at 20:48

## 2019-09-25 RX ADMIN — ATORVASTATIN CALCIUM 40 MILLIGRAM(S): 80 TABLET, FILM COATED ORAL at 20:51

## 2019-09-25 RX ADMIN — Medication 25 MILLIGRAM(S): at 20:51

## 2019-09-25 RX ADMIN — SODIUM CHLORIDE 3 MILLILITER(S): 9 INJECTION INTRAMUSCULAR; INTRAVENOUS; SUBCUTANEOUS at 20:49

## 2019-09-25 RX ADMIN — SODIUM CHLORIDE 3 MILLILITER(S): 9 INJECTION INTRAMUSCULAR; INTRAVENOUS; SUBCUTANEOUS at 06:22

## 2019-09-25 RX ADMIN — INSULIN GLARGINE 10 UNIT(S): 100 INJECTION, SOLUTION SUBCUTANEOUS at 20:58

## 2019-09-25 RX ADMIN — Medication 100 MILLIGRAM(S): at 20:51

## 2019-09-25 RX ADMIN — Medication 100 MILLIGRAM(S): at 06:22

## 2019-09-25 RX ADMIN — Medication 325 MILLIGRAM(S): at 20:49

## 2019-09-25 RX ADMIN — Medication 1 APPLICATION(S): at 20:49

## 2019-09-25 NOTE — PROGRESS NOTE ADULT - ASSESSMENT
1) ESRD on HD  2) MBD Of renal dx  3) Anemia of renal dx  4) Vol / HTN    Plan for dialysis today  3 hours; 1-2kg as tolerated UF  Check daily phosphorus  Phos 3.8 stable  Epo on HD if HGB < 10  Will follow  d/w 4 TWR CTU team

## 2019-09-25 NOTE — PROGRESS NOTE ADULT - ASSESSMENT
58yo Male known to our service with a PMHx DM (HA1c 8.4), HTN, HLD, prior CVA, sciatica, and former smoker,  acute on chronic CKD, new to HD (M / W/ F) requiring R chest permacath,  NSTEMI ,  LUE AVF placement 8/26. S/P  C3L on 9/13 with Dr Mims . uneventful post op course and was  discharged on 9/18.  Called CTS office stating he has had frequent vomiting 2-3 times daily x 72 hours Denied fever,diarrhea,rigors or sore throat  Neg sick contacts   On exam lower pole sternum with purulent drainage.  No click C/S swab x 2 obtained  Vanco x 1 .  HD completed.  9/24  ID called and antibiotics changed to vanco/zosyn.

## 2019-09-25 NOTE — PROGRESS NOTE ADULT - PROBLEM SELECTOR PLAN 1
ID consult appreciated.  Plan for vanco post HD and Zosyn per ID.  Betadine QID to MSI per Dr. Mims.  Ct chest without collection or sternal involvement.

## 2019-09-26 ENCOUNTER — TRANSCRIPTION ENCOUNTER (OUTPATIENT)
Age: 59
End: 2019-09-26

## 2019-09-26 VITALS
TEMPERATURE: 98 F | SYSTOLIC BLOOD PRESSURE: 114 MMHG | DIASTOLIC BLOOD PRESSURE: 68 MMHG | HEART RATE: 72 BPM | OXYGEN SATURATION: 96 % | RESPIRATION RATE: 18 BRPM

## 2019-09-26 LAB
ALBUMIN SERPL ELPH-MCNC: 2.9 G/DL — LOW (ref 3.3–5.2)
ALP SERPL-CCNC: 73 U/L — SIGNIFICANT CHANGE UP (ref 40–120)
ALT FLD-CCNC: 16 U/L — SIGNIFICANT CHANGE UP
ANION GAP SERPL CALC-SCNC: 14 MMOL/L — SIGNIFICANT CHANGE UP (ref 5–17)
AST SERPL-CCNC: 25 U/L — SIGNIFICANT CHANGE UP
BILIRUB SERPL-MCNC: 0.6 MG/DL — SIGNIFICANT CHANGE UP (ref 0.4–2)
BUN SERPL-MCNC: 28 MG/DL — HIGH (ref 8–20)
CALCIUM SERPL-MCNC: 8.4 MG/DL — LOW (ref 8.6–10.2)
CHLORIDE SERPL-SCNC: 99 MMOL/L — SIGNIFICANT CHANGE UP (ref 98–107)
CO2 SERPL-SCNC: 25 MMOL/L — SIGNIFICANT CHANGE UP (ref 22–29)
CREAT SERPL-MCNC: 3.56 MG/DL — HIGH (ref 0.5–1.3)
GLUCOSE BLDC GLUCOMTR-MCNC: 105 MG/DL — HIGH (ref 70–99)
GLUCOSE SERPL-MCNC: 97 MG/DL — SIGNIFICANT CHANGE UP (ref 70–115)
HCT VFR BLD CALC: 28.4 % — LOW (ref 39–50)
HGB BLD-MCNC: 8.8 G/DL — LOW (ref 13–17)
MAGNESIUM SERPL-MCNC: 1.9 MG/DL — SIGNIFICANT CHANGE UP (ref 1.8–2.6)
MCHC RBC-ENTMCNC: 26.8 PG — LOW (ref 27–34)
MCHC RBC-ENTMCNC: 31 GM/DL — LOW (ref 32–36)
MCV RBC AUTO: 86.6 FL — SIGNIFICANT CHANGE UP (ref 80–100)
PHOSPHATE SERPL-MCNC: 3.2 MG/DL — SIGNIFICANT CHANGE UP (ref 2.4–4.7)
PLATELET # BLD AUTO: 332 K/UL — SIGNIFICANT CHANGE UP (ref 150–400)
POTASSIUM SERPL-MCNC: 4.9 MMOL/L — SIGNIFICANT CHANGE UP (ref 3.5–5.3)
POTASSIUM SERPL-SCNC: 4.9 MMOL/L — SIGNIFICANT CHANGE UP (ref 3.5–5.3)
PROT SERPL-MCNC: 6.6 G/DL — SIGNIFICANT CHANGE UP (ref 6.6–8.7)
RBC # BLD: 3.28 M/UL — LOW (ref 4.2–5.8)
RBC # FLD: 15.4 % — HIGH (ref 10.3–14.5)
SODIUM SERPL-SCNC: 138 MMOL/L — SIGNIFICANT CHANGE UP (ref 135–145)
WBC # BLD: 9.85 K/UL — SIGNIFICANT CHANGE UP (ref 3.8–10.5)
WBC # FLD AUTO: 9.85 K/UL — SIGNIFICANT CHANGE UP (ref 3.8–10.5)

## 2019-09-26 PROCEDURE — 99024 POSTOP FOLLOW-UP VISIT: CPT

## 2019-09-26 PROCEDURE — 99232 SBSQ HOSP IP/OBS MODERATE 35: CPT

## 2019-09-26 PROCEDURE — 99233 SBSQ HOSP IP/OBS HIGH 50: CPT

## 2019-09-26 RX ORDER — INSULIN GLARGINE 100 [IU]/ML
15 INJECTION, SOLUTION SUBCUTANEOUS
Qty: 5 | Refills: 0
Start: 2019-09-26

## 2019-09-26 RX ORDER — POVIDONE-IODINE 5 %
1 AEROSOL (ML) TOPICAL
Qty: 1 | Refills: 0
Start: 2019-09-26 | End: 2019-10-05

## 2019-09-26 RX ORDER — ACETAMINOPHEN 500 MG
2 TABLET ORAL
Qty: 0 | Refills: 0 | DISCHARGE
Start: 2019-09-26

## 2019-09-26 RX ORDER — LINAGLIPTIN 5 MG/1
1 TABLET, FILM COATED ORAL
Qty: 30 | Refills: 1
Start: 2019-09-26 | End: 2019-11-24

## 2019-09-26 RX ADMIN — Medication 100 MILLIGRAM(S): at 05:45

## 2019-09-26 RX ADMIN — Medication 1 APPLICATION(S): at 11:51

## 2019-09-26 RX ADMIN — PIPERACILLIN AND TAZOBACTAM 25 GRAM(S): 4; .5 INJECTION, POWDER, LYOPHILIZED, FOR SOLUTION INTRAVENOUS at 06:49

## 2019-09-26 RX ADMIN — CLOPIDOGREL BISULFATE 75 MILLIGRAM(S): 75 TABLET, FILM COATED ORAL at 11:58

## 2019-09-26 RX ADMIN — Medication 325 MILLIGRAM(S): at 11:58

## 2019-09-26 RX ADMIN — SODIUM CHLORIDE 3 MILLILITER(S): 9 INJECTION INTRAMUSCULAR; INTRAVENOUS; SUBCUTANEOUS at 05:44

## 2019-09-26 RX ADMIN — Medication 250 MILLIGRAM(S): at 01:58

## 2019-09-26 RX ADMIN — Medication 1 TABLET(S): at 05:45

## 2019-09-26 RX ADMIN — Medication 25 MILLIGRAM(S): at 05:45

## 2019-09-26 RX ADMIN — Medication 1 APPLICATION(S): at 05:45

## 2019-09-26 NOTE — DISCHARGE NOTE PROVIDER - CARE PROVIDERS DIRECT ADDRESSES
,DirectAddress_Unknown,[email or web address]

## 2019-09-26 NOTE — PROGRESS NOTE ADULT - REASON FOR ADMISSION
Sternal wound drainage  Nausea,vomiting
Sternal wound drainage, nausea, vomiting
Sternal wound drainage  Nausea,vomiting
Sternal wound drainage  Nausea,vomiting

## 2019-09-26 NOTE — DISCHARGE NOTE PROVIDER - PROVIDER TOKENS
PROVIDER:[TOKEN:[[id number]],FOLLOWUP:[1 week]],PROVIDER:[TOKEN:[[id number]],FOLLOWUP:[2 weeks]]

## 2019-09-26 NOTE — DISCHARGE NOTE NURSING/CASE MANAGEMENT/SOCIAL WORK - NSDCFUADDAPPT_GEN_ALL_CORE_FT
Resume HD seat at [facility] MWF 3:30 chair time  [phone] [address]  [place] [id number] Resume HD seat at [facility] MWF 3:30 chair time  [phone] [address] [address] [address]    APPOINTMENT AT [facility] ON 10/1/19 AT 1:00 PM WITH DR. NICKOLAS THOMAS.    PLEASE BRING ID, INSURANCE CARD AND DISCHARGE PAPERWORK WITH YOU TO YOUR APPOINTMENT

## 2019-09-26 NOTE — DISCHARGE NOTE PROVIDER - CARE PROVIDER_API CALL
Isauro Prajapati)  Infectious Disease; Internal Medicine  [address], [address]  [place] [address]  Phone: [phone]  Fax: [phone]  Follow Up Time: 1 week    Ephraim Mims)  Surgery; Thoracic and Cardiac Surgery  [address] [place] [address]  Phone: [phone]  Fax: [phone]  Follow Up Time: 2 weeks

## 2019-09-26 NOTE — DISCHARGE NOTE PROVIDER - NSDCCPCAREPLAN_GEN_ALL_CORE_FT
PRINCIPAL DISCHARGE DIAGNOSIS  Diagnosis: Post op infection  Assessment and Plan of Treatment: You were diagnosed with a superficial sternal wound infection which is now controlled.   Please TAKE ALL OF YOUR ANTIBIOTICS (Doxycycline and Augmentin) AS ORDERED.   Please FOLLOW UP with Dr Prajapati within the next WEEK.  Please FOLLOW UP with Dr Mims at previously scheduled appointment.  Please APPLY BETADINE SWAB to sternal wound twice daily and cover with dry dressing.  Please call the Cardiothoracic Surgery office at [phone] if you are experiencing any shortness of breath, chest pain, fevers or chills, drainage from the incisions, persistent nausea, vomiting or if you have any questions about your medications. If the symptoms are severe, call 911 and go to the nearest hospital. You can also call [phone] for an emergency [facility] ambulance, which will take you to the closest [facility].  If you need any assistance for making any appointments for a new consult or referral in any specialty, please call our [facility] Clinical Coordination Center at [phone].      SECONDARY DISCHARGE DIAGNOSES  Diagnosis: Type 2 diabetes mellitus without complication, without long-term current use of insulin  Assessment and Plan of Treatment: It is extremely important to follow a diabetic (consistent carbohydrates, LOW/NO ADDED SUGAR) diet and monitor your glucose (sugar) levels. You have an increased risk of complications, including wound infections, due to the diabetes.  1. Check your glucoses 3-4 times daily before meals and bedtime.   2. Keep a log of your glucose levels every day.   3. Make an appointment to meet with your primary care provider or endocrinologist within a week.   4. Take ALL of your medications as prescribed. Only hold medications for low glucose levels (< 80) or if you are symptomatic (dizzy, sweating, lightheaded).  5. Ideally, we would like all of the glucose levels to be between .   You may have been discharged on different medications than you were taking before. Your body reacts differently to the medications after surgery. Please continue to take the medications as prescribed and notify the office, nurse practitioner or your PCP if you have any concerns right away.    Diagnosis: Essential hypertension  Assessment and Plan of Treatment: Take all medications as prescribed.    Diagnosis: S/P CABG x 3  Assessment and Plan of Treatment: 1. Take ALL of your medications as ordered. Fill your prescriptions the day you are discharged and take according to the schedule you were given. Continue to take a stool softener if you are taking narcotic pain medications. AVOID medications such as ibuprofen or naproxen if you have had bypass surgery. If you have any questions or are unable to fill the prescriptions, please call the office right away at [phone].  2. Shower daily. Clean all incisions daily while showering with warm water and mild soap, pat dry with a clean towel and do not cover with any dressings unless instructed to. No bathing, swimming in a pool or the ocean until instructed by MD.  DO NOT use creams or lotions on the wound.  3. We advise that you do not drive until instructed by MD. Sit in the rear passenger seat with the red pillow between chest and seatbelt to avoid injury in the event of a motor vehicle accident until you see the surgeon again in the office.  4. You may not return to work until instructed by MD.   5. Please eat a low fat, low cholesterol, low salt diet. (No added/extra salt). A nutritional supplement like Ensure or Glucerna (for diabetics) may help you reach your nutritional goals after surgery and aid in your recovery.  6. Weigh yourself every day in the morning and record it in the weight log in your red folder. Notify the office of any weight gain more than 2-3 pounds in 24 hours.  **Please LIMIT your fluid intake to less than a liter a day.**  7. Continue breathing exercises several times a day. Continue to use your heart pillow when coughing.  8. No heavy lifting nothing greater than 5 pounds until cleared by MD. We recommend that you sleep on your back and not your side or stomach for the next 4-6 weeks.  9. Call / Notify MD any fever greater than 101.0, any drainage from incisions or if they become red, hot or very tender to the touch.  10. Increase activity as tolerated. Walk indoors and/or outdoors at least 3 times a day.

## 2019-09-26 NOTE — DISCHARGE NOTE NURSING/CASE MANAGEMENT/SOCIAL WORK - NSTRANSFERBELONGINGSRESP_GEN_A_NUR
lose bladder or bowel control.    Watch closely for changes in your health, and be sure to contact your doctor if:    · You have a fever, lose weight, or don't feel well.     · You are not getting better as expected. Where can you learn more? Go to [email or web address] and sign in to your [employer] account. Enter V228 in the SFOX box to learn more about \"Back Strain: Care Instructions. \"     If you do not have an account, please click on the \"Sign Up Now\" link. Current as of: November 29, 2017  Content Version: 11.7  © 3942-5260 [employer]. Care instructions adapted under license by [facility] ([facility]). If you have questions about a medical condition or this instruction, always ask your healthcare professional. [address] any warranty or liability for your use of this information.
yes

## 2019-09-26 NOTE — DISCHARGE NOTE PROVIDER - NSDCACTIVITY_GEN_ALL_CORE
Do not drive or operate machinery/Showering allowed/Stairs allowed/Walking - Outdoors allowed/Walking - Indoors allowed/No heavy lifting/straining/Do not make important decisions

## 2019-09-26 NOTE — PROGRESS NOTE ADULT - PROBLEM SELECTOR PLAN 1
ID consult appreciated.  Plan for change to Doxycycline and Augmentin x 10 days and f/u with Dr Prajapati as outpt  Betadine QID to MSI per Dr. Mims.  Ct chest without collection or sternal involvement.

## 2019-09-26 NOTE — DISCHARGE NOTE PROVIDER - HOSPITAL COURSE
58yo Male known to our service with a PMHx DM (HA1c 8.4), HTN, HLD, prior CVA, sciatica, and former smoker,  acute on chronic CKD, new to HD (M / W/ F) requiring R chest permacath,  NSTEMI ,  LUE AVF placement 8/26. S/P  C3L on 9/13 with Dr Mims . uneventful post op course and was  discharged on 9/18.    Called CTS office stating he has had frequent vomiting 2-3 times daily x 72 hours Denied fever,diarrhea,rigors or sore throat  Neg sick contacts     On exam lower pole sternum with purulent drainage.    No click C/S swab x 2 obtained  Vanco x 1 .  HD completed.    9/24  ID called and antibiotics changed to vanco/zosyn.    9/25 HD        Blood cultures negative to date, skin + few coag neg staph        CT scan : no deep collection, sternum intact

## 2019-09-26 NOTE — DISCHARGE NOTE NURSING/CASE MANAGEMENT/SOCIAL WORK - NSDCPEEMAIL_GEN_ALL_CORE
[facility] for Tobacco Control email [email or web address]

## 2019-09-26 NOTE — DISCHARGE NOTE PROVIDER - NSDCFUADDAPPT_GEN_ALL_CORE_FT
Resume HD seat at [facility] MW 3:30 chair time  [phone] [address] [place] [address]    Your appointment with Dr Mims is October 9th at 2PM.  Please call Dr Prajapati's office at your earliest convenience to make an appointment for NEXT WEEK.

## 2019-09-26 NOTE — DISCHARGE NOTE NURSING/CASE MANAGEMENT/SOCIAL WORK - NSDCPEWEB_GEN_ALL_CORE
[facility] website --- [email or web address] --- [email or web address]

## 2019-09-26 NOTE — PROGRESS NOTE ADULT - ASSESSMENT
1) ESRD on HD  2) MBD Of renal dx  3) Anemia of renal dx  4) Vol / HTN    HD in am - orders placed  3 hours; 1-2kg as tolerated UF  Check daily phosphorus  Phos 3.8 stable  Epo on HD if HGB < 10  Continue current management  d/w 4 TWR CTU team

## 2019-09-26 NOTE — DISCHARGE NOTE NURSING/CASE MANAGEMENT/SOCIAL WORK - PATIENT PORTAL LINK FT
You can access the FollowMyHealth Patient Portal offered by [facility] by registering at the following website: [email or web address]. By joining [employer]s FollowMyHealth portal, you will also be able to view your health information using other applications (apps) compatible with our system.

## 2019-09-26 NOTE — PROGRESS NOTE ADULT - ASSESSMENT
60yo Male known to our service with a PMHx DM (HA1c 8.4), HTN, HLD, prior CVA, sciatica, and former smoker,  acute on chronic CKD, new to HD (M / W/ F) requiring R chest permacath,  NSTEMI ,  LUE AVF placement 8/26. S/P  C3L on 9/13 with Dr Mims . uneventful post op course and was  discharged on 9/18.  Called CTS office stating he has had frequent vomiting 2-3 times daily x 72 hours Denied fever,diarrhea,rigors or sore throat  Neg sick contacts   On exam lower pole sternum with purulent drainage.  No click C/S swab x 2 obtained  Vanco x 1 .  HD completed.  9/24  ID called and antibiotics changed to vanco/zosyn.  9/25 HD    Blood cultures negative to date, skin + few coag neg staph    CT scan : no deep collection, sternum intact    Cleared for d/c home on PO abx per ID today. Betadine and dsd daily to incision

## 2019-09-28 LAB
CULTURE RESULTS: SIGNIFICANT CHANGE UP
CULTURE RESULTS: SIGNIFICANT CHANGE UP
SPECIMEN SOURCE: SIGNIFICANT CHANGE UP
SPECIMEN SOURCE: SIGNIFICANT CHANGE UP

## 2019-10-01 DIAGNOSIS — Z76.89 PERSONS ENCOUNTERING HEALTH SERVICES IN OTHER SPECIFIED CIRCUMSTANCES: ICD-10-CM

## 2019-10-01 PROCEDURE — G9005: CPT

## 2019-10-02 PROBLEM — Z95.1 PRESENCE OF AORTOCORONARY BYPASS GRAFT: Chronic | Status: ACTIVE | Noted: 2019-09-23

## 2019-10-02 PROBLEM — Z98.890 OTHER SPECIFIED POSTPROCEDURAL STATES: Chronic | Status: ACTIVE | Noted: 2019-09-23

## 2019-10-09 ENCOUNTER — APPOINTMENT (OUTPATIENT)
Dept: CARDIOTHORACIC SURGERY | Facility: CLINIC | Age: 59
End: 2019-10-09
Payer: MEDICAID

## 2019-10-09 PROCEDURE — 99024 POSTOP FOLLOW-UP VISIT: CPT

## 2019-10-09 NOTE — PHYSICAL EXAM
[Respiration, Rhythm And Depth] : normal respiratory rhythm and effort [Heart Rate And Rhythm] : heart rate was normal and rhythm regular [Clean] : clean [Dry] : dry [Healing Well] : healing well [No Edema] : no edema

## 2019-10-10 ENCOUNTER — APPOINTMENT (OUTPATIENT)
Dept: CARDIOLOGY | Facility: CLINIC | Age: 59
End: 2019-10-10
Payer: MEDICAID

## 2019-10-10 VITALS
HEIGHT: 64 IN | SYSTOLIC BLOOD PRESSURE: 146 MMHG | DIASTOLIC BLOOD PRESSURE: 70 MMHG | WEIGHT: 188 LBS | HEART RATE: 90 BPM | BODY MASS INDEX: 32.1 KG/M2 | OXYGEN SATURATION: 98 %

## 2019-10-10 PROCEDURE — 99205 OFFICE O/P NEW HI 60 MIN: CPT

## 2019-10-10 RX ORDER — LOSARTAN POTASSIUM AND HYDROCHLOROTHIAZIDE 12.5; 1 MG/1; MG/1
100-12.5 TABLET ORAL
Refills: 0 | Status: DISCONTINUED | COMMUNITY
End: 2019-10-10

## 2019-10-10 RX ORDER — METOPROLOL TARTRATE 25 MG/1
25 TABLET, FILM COATED ORAL 3 TIMES DAILY
Refills: 0 | Status: DISCONTINUED | COMMUNITY
End: 2019-10-10

## 2019-10-10 NOTE — HISTORY OF PRESENT ILLNESS
[FreeTextEntry1] : The patient walks with a cane. He has end-stage renal disease on hemodialysis. The patient recently underwent coronary artery bypass grafting. The patient has not had any exertional chest discomfort or shortness of breath since his surgery. He ambulates slowly. He can walk about 30-60 seconds. When he walks often he is limited by calf burning and tightness. The patient had left ventricular dysfunction prior to bypass surgery.

## 2019-10-10 NOTE — PHYSICAL EXAM
[General Appearance - Well Developed] : well developed [Normal Appearance] : normal appearance [Well Groomed] : well groomed [General Appearance - Well Nourished] : well nourished [No Deformities] : no deformities [Normal Conjunctiva] : the conjunctiva exhibited no abnormalities [FreeTextEntry1] : 146/70, 90, 14 [General Appearance - In No Acute Distress] : no acute distress [No Oral Pallor] : no oral pallor [Eyelids - No Xanthelasma] : the eyelids demonstrated no xanthelasmas [Normal Oral Mucosa] : normal oral mucosa [No Oral Cyanosis] : no oral cyanosis [Normal Jugular Venous A Waves Present] : normal jugular venous A waves present [Normal Jugular Venous V Waves Present] : normal jugular venous V waves present [No Jugular Venous Tejada A Waves] : no jugular venous tejada A waves [Heart Rate And Rhythm] : heart rate and rhythm were normal [Heart Sounds] : normal S1 and S2 [Murmurs] : no murmurs present [Respiration, Rhythm And Depth] : normal respiratory rhythm and effort [Exaggerated Use Of Accessory Muscles For Inspiration] : no accessory muscle use [Auscultation Breath Sounds / Voice Sounds] : lungs were clear to auscultation bilaterally [Abdomen Soft] : soft [Abdomen Tenderness] : non-tender [Abdomen Mass (___ Cm)] : no abdominal mass palpated [Abnormal Walk] : normal gait [Nail Clubbing] : no clubbing of the fingernails [Gait - Sufficient For Exercise Testing] : the gait was sufficient for exercise testing [Cyanosis, Localized] : no localized cyanosis [Petechial Hemorrhages (___cm)] : no petechial hemorrhages [No Venous Stasis] : no venous stasis [Skin Color & Pigmentation] : normal skin color and pigmentation [] : no rash [No Skin Ulcers] : no skin ulcer [Skin Lesions] : no skin lesions [Oriented To Time, Place, And Person] : oriented to person, place, and time [No Xanthoma] : no  xanthoma was observed [Affect] : the affect was normal [No Anxiety] : not feeling anxious [Mood] : the mood was normal

## 2019-10-10 NOTE — ASSESSMENT
[FreeTextEntry1] : Very pleasant patient s/p CABGx3.    Currently, patient is doing well.  Report slight shortness of breath and incisional discomfort, but this is getting better over the last few days.  Denies, fevers, chills, or additional problems.  On exam, vital signs are stable, lungs are clear, and all incisions are healing nicely.  I have recommended progressive increase in activity.    He is scheduled to see vascular to define when AV graft can be used for HD, and remove permacath.  Thank you for the opportunity to participate in the care of your patient.  Please do not hesitate to call me if I can be of further help.

## 2019-10-10 NOTE — DISCUSSION/SUMMARY
[FreeTextEntry1] : Cardiomyopathy:  Short acting metoprolol has been discontinued. Long-acting metoprolol succinate 100 mg daily has been ordered.  The patient will need repeat echocardiography after titration of pharmacologic therapy.\par \par Hypertension: We'll followup on higher dose of metoprolol succinate.\par \par Claudication: ABIs have been arranged. Unable to palpate dorsalis pedis pulse\par \par CAD:  cont asa/plavix for now.  Cath done for acute  CHF, trop positive w ESRD (trop 0.123) found w 3 vd.

## 2019-10-10 NOTE — ASSESSMENT
[FreeTextEntry1] : There is a high complexity to the medical decision making in this case.  There is an extensive number of diagnostic or management options regarding the chief complaint.  These include stress test, echo, increase b blocker, ace i . The chief complaint is an illness which causes threat to life.\par \par All of the data entered by staff today into the electronic health record  has been reviewed.  I am in agreement with all of the data.\par \par \par

## 2019-10-10 NOTE — CONSULT LETTER
[Please see my note below.] : Please see my note below. [Consult Closing:] : Thank you very much for allowing me to participate in the care of this patient.  If you have any questions, please do not hesitate to contact me. [Sincerely,] : Sincerely, [FreeTextEntry3] : Ephraim Mims MD\par Chief, Cardiovascular Surgery at [facility]\par System Director of Surgical Heart Failure\par Professor Cardiovascular Surgery at [facility]\par

## 2019-10-31 PROCEDURE — 82310 ASSAY OF CALCIUM: CPT

## 2019-10-31 PROCEDURE — 83540 ASSAY OF IRON: CPT

## 2019-10-31 PROCEDURE — 80053 COMPREHEN METABOLIC PANEL: CPT

## 2019-10-31 PROCEDURE — 84244 ASSAY OF RENIN: CPT

## 2019-10-31 PROCEDURE — 86705 HEP B CORE ANTIBODY IGM: CPT

## 2019-10-31 PROCEDURE — 83550 IRON BINDING TEST: CPT

## 2019-10-31 PROCEDURE — 84100 ASSAY OF PHOSPHORUS: CPT

## 2019-10-31 PROCEDURE — 83690 ASSAY OF LIPASE: CPT

## 2019-10-31 PROCEDURE — 85610 PROTHROMBIN TIME: CPT

## 2019-10-31 PROCEDURE — 87070 CULTURE OTHR SPECIMN AEROBIC: CPT

## 2019-10-31 PROCEDURE — 84132 ASSAY OF SERUM POTASSIUM: CPT

## 2019-10-31 PROCEDURE — 99261: CPT

## 2019-10-31 PROCEDURE — 93880 EXTRACRANIAL BILAT STUDY: CPT

## 2019-10-31 PROCEDURE — 84466 ASSAY OF TRANSFERRIN: CPT

## 2019-10-31 PROCEDURE — 83735 ASSAY OF MAGNESIUM: CPT

## 2019-10-31 PROCEDURE — 86803 HEPATITIS C AB TEST: CPT

## 2019-10-31 PROCEDURE — 82570 ASSAY OF URINE CREATININE: CPT

## 2019-10-31 PROCEDURE — 99285 EMERGENCY DEPT VISIT HI MDM: CPT

## 2019-10-31 PROCEDURE — 94010 BREATHING CAPACITY TEST: CPT

## 2019-10-31 PROCEDURE — 83036 HEMOGLOBIN GLYCOSYLATED A1C: CPT

## 2019-10-31 PROCEDURE — 85027 COMPLETE CBC AUTOMATED: CPT

## 2019-10-31 PROCEDURE — 93306 TTE W/DOPPLER COMPLETE: CPT

## 2019-10-31 PROCEDURE — 85730 THROMBOPLASTIN TIME PARTIAL: CPT

## 2019-10-31 PROCEDURE — 82962 GLUCOSE BLOOD TEST: CPT

## 2019-10-31 PROCEDURE — 85576 BLOOD PLATELET AGGREGATION: CPT

## 2019-10-31 PROCEDURE — 80048 BASIC METABOLIC PNL TOTAL CA: CPT

## 2019-10-31 PROCEDURE — 36415 COLL VENOUS BLD VENIPUNCTURE: CPT

## 2019-10-31 PROCEDURE — 86709 HEPATITIS A IGM ANTIBODY: CPT

## 2019-10-31 PROCEDURE — 87086 URINE CULTURE/COLONY COUNT: CPT

## 2019-10-31 PROCEDURE — 87340 HEPATITIS B SURFACE AG IA: CPT

## 2019-10-31 PROCEDURE — 84156 ASSAY OF PROTEIN URINE: CPT

## 2019-10-31 PROCEDURE — 93005 ELECTROCARDIOGRAM TRACING: CPT

## 2019-10-31 PROCEDURE — 84295 ASSAY OF SERUM SODIUM: CPT

## 2019-10-31 PROCEDURE — 86850 RBC ANTIBODY SCREEN: CPT

## 2019-10-31 PROCEDURE — 71045 X-RAY EXAM CHEST 1 VIEW: CPT

## 2019-10-31 PROCEDURE — 87640 STAPH A DNA AMP PROBE: CPT

## 2019-10-31 PROCEDURE — 83880 ASSAY OF NATRIURETIC PEPTIDE: CPT

## 2019-10-31 PROCEDURE — 82435 ASSAY OF BLOOD CHLORIDE: CPT

## 2019-10-31 PROCEDURE — 71250 CT THORAX DX C-: CPT

## 2019-10-31 PROCEDURE — 84484 ASSAY OF TROPONIN QUANT: CPT

## 2019-10-31 PROCEDURE — 82150 ASSAY OF AMYLASE: CPT

## 2019-10-31 PROCEDURE — G0365: CPT

## 2019-10-31 PROCEDURE — 86923 COMPATIBILITY TEST ELECTRIC: CPT

## 2019-10-31 PROCEDURE — 84439 ASSAY OF FREE THYROXINE: CPT

## 2019-10-31 PROCEDURE — 87040 BLOOD CULTURE FOR BACTERIA: CPT

## 2019-10-31 PROCEDURE — 86706 HEP B SURFACE ANTIBODY: CPT

## 2019-10-31 PROCEDURE — 83605 ASSAY OF LACTIC ACID: CPT

## 2019-10-31 PROCEDURE — 82550 ASSAY OF CK (CPK): CPT

## 2019-10-31 PROCEDURE — 97530 THERAPEUTIC ACTIVITIES: CPT

## 2019-10-31 PROCEDURE — 94760 N-INVAS EAR/PLS OXIMETRY 1: CPT

## 2019-10-31 PROCEDURE — 84134 ASSAY OF PREALBUMIN: CPT

## 2019-10-31 PROCEDURE — 86704 HEP B CORE ANTIBODY TOTAL: CPT

## 2019-10-31 PROCEDURE — 82085 ASSAY OF ALDOLASE: CPT

## 2019-10-31 PROCEDURE — 82947 ASSAY GLUCOSE BLOOD QUANT: CPT

## 2019-10-31 PROCEDURE — 87641 MR-STAPH DNA AMP PROBE: CPT

## 2019-10-31 PROCEDURE — 82330 ASSAY OF CALCIUM: CPT

## 2019-10-31 PROCEDURE — 97116 GAIT TRAINING THERAPY: CPT

## 2019-10-31 PROCEDURE — 76770 US EXAM ABDO BACK WALL COMP: CPT

## 2019-10-31 PROCEDURE — 82803 BLOOD GASES ANY COMBINATION: CPT

## 2019-10-31 PROCEDURE — 84460 ALANINE AMINO (ALT) (SGPT): CPT

## 2019-10-31 PROCEDURE — 83970 ASSAY OF PARATHORMONE: CPT

## 2019-10-31 PROCEDURE — 86901 BLOOD TYPING SEROLOGIC RH(D): CPT

## 2019-10-31 PROCEDURE — 85014 HEMATOCRIT: CPT

## 2019-10-31 PROCEDURE — 97110 THERAPEUTIC EXERCISES: CPT

## 2019-10-31 PROCEDURE — 94640 AIRWAY INHALATION TREATMENT: CPT

## 2019-10-31 PROCEDURE — 80061 LIPID PANEL: CPT

## 2019-10-31 PROCEDURE — 82553 CREATINE MB FRACTION: CPT

## 2019-10-31 PROCEDURE — 86900 BLOOD TYPING SEROLOGIC ABO: CPT

## 2019-10-31 PROCEDURE — 82306 VITAMIN D 25 HYDROXY: CPT

## 2019-10-31 PROCEDURE — 82728 ASSAY OF FERRITIN: CPT

## 2019-10-31 PROCEDURE — 81001 URINALYSIS AUTO W/SCOPE: CPT

## 2019-10-31 PROCEDURE — 84443 ASSAY THYROID STIM HORMONE: CPT

## 2019-11-01 ENCOUNTER — OUTPATIENT (OUTPATIENT)
Dept: OUTPATIENT SERVICES | Facility: HOSPITAL | Age: 59
LOS: 1 days | End: 2019-11-01
Payer: MEDICARE

## 2019-11-01 DIAGNOSIS — Z95.1 PRESENCE OF AORTOCORONARY BYPASS GRAFT: Chronic | ICD-10-CM

## 2019-11-01 PROCEDURE — G9005: CPT

## 2019-11-06 ENCOUNTER — APPOINTMENT (OUTPATIENT)
Dept: CARDIOLOGY | Facility: CLINIC | Age: 59
End: 2019-11-06
Payer: MEDICARE

## 2019-11-06 PROCEDURE — 93923 UPR/LXTR ART STDY 3+ LVLS: CPT

## 2019-11-07 ENCOUNTER — APPOINTMENT (OUTPATIENT)
Dept: CARDIOLOGY | Facility: CLINIC | Age: 59
End: 2019-11-07
Payer: MEDICARE

## 2019-11-07 VITALS
WEIGHT: 191 LBS | OXYGEN SATURATION: 98 % | SYSTOLIC BLOOD PRESSURE: 118 MMHG | BODY MASS INDEX: 32.61 KG/M2 | DIASTOLIC BLOOD PRESSURE: 66 MMHG | HEIGHT: 64 IN | HEART RATE: 73 BPM

## 2019-11-07 PROCEDURE — 99214 OFFICE O/P EST MOD 30 MIN: CPT

## 2019-11-07 NOTE — DISCUSSION/SUMMARY
[FreeTextEntry1] : Cardiomyopathy:  Continue long-acting metoprolol succinate 100 mg daily. The patient will need repeat echocardiography after titration of pharmacologic therapy.\par \par Hypertension: appears controlled continue current medical therapy.\par \par Claudication: ABIs performed on 11/26/2019 demonstrated severe bilateral lower extremity peripheral arterial disease. Continue DAPT, statin. Will add cilostazol. Will refer to vascular specialist Dr. Leone for arterial dopplers and further evaluation and management. \par \par CAD:  cont asa/plavix for now.  Cath done for acute  CHF, trop positive w ESRD (trop 0.123) found w 3 vd.  s/p CABG

## 2019-11-07 NOTE — PHYSICAL EXAM
[General Appearance - Well Developed] : well developed [Normal Appearance] : normal appearance [Well Groomed] : well groomed [General Appearance - Well Nourished] : well nourished [No Deformities] : no deformities [General Appearance - In No Acute Distress] : no acute distress [FreeTextEntry1] : 146/70, 90, 14 [Normal Conjunctiva] : the conjunctiva exhibited no abnormalities [Eyelids - No Xanthelasma] : the eyelids demonstrated no xanthelasmas [Normal Oral Mucosa] : normal oral mucosa [No Oral Pallor] : no oral pallor [No Oral Cyanosis] : no oral cyanosis [Normal Jugular Venous A Waves Present] : normal jugular venous A waves present [Normal Jugular Venous V Waves Present] : normal jugular venous V waves present [No Jugular Venous Tejada A Waves] : no jugular venous tejada A waves [Respiration, Rhythm And Depth] : normal respiratory rhythm and effort [Auscultation Breath Sounds / Voice Sounds] : lungs were clear to auscultation bilaterally [Exaggerated Use Of Accessory Muscles For Inspiration] : no accessory muscle use [Heart Sounds] : normal S1 and S2 [Heart Rate And Rhythm] : heart rate and rhythm were normal [Murmurs] : no murmurs present [Edema] : no peripheral edema present [Abdomen Soft] : soft [Abdomen Tenderness] : non-tender [Abnormal Walk] : normal gait [Abdomen Mass (___ Cm)] : no abdominal mass palpated [Nail Clubbing] : no clubbing of the fingernails [Gait - Sufficient For Exercise Testing] : the gait was sufficient for exercise testing [Cyanosis, Localized] : no localized cyanosis [Petechial Hemorrhages (___cm)] : no petechial hemorrhages [Skin Color & Pigmentation] : normal skin color and pigmentation [] : no rash [No Venous Stasis] : no venous stasis [Skin Lesions] : no skin lesions [No Xanthoma] : no  xanthoma was observed [No Skin Ulcers] : no skin ulcer [Oriented To Time, Place, And Person] : oriented to person, place, and time [Affect] : the affect was normal [Mood] : the mood was normal [No Anxiety] : not feeling anxious

## 2019-11-20 ENCOUNTER — APPOINTMENT (OUTPATIENT)
Dept: CARDIOLOGY | Facility: CLINIC | Age: 59
End: 2019-11-20
Payer: MEDICARE

## 2019-11-20 VITALS
RESPIRATION RATE: 18 BRPM | HEIGHT: 64 IN | OXYGEN SATURATION: 100 % | HEART RATE: 85 BPM | SYSTOLIC BLOOD PRESSURE: 149 MMHG | BODY MASS INDEX: 33.29 KG/M2 | DIASTOLIC BLOOD PRESSURE: 68 MMHG | WEIGHT: 195 LBS

## 2019-11-20 PROCEDURE — 99205 OFFICE O/P NEW HI 60 MIN: CPT

## 2019-11-20 PROCEDURE — 93925 LOWER EXTREMITY STUDY: CPT

## 2019-11-20 RX ORDER — NATEGLINIDE 60 MG/1
60 TABLET, COATED ORAL
Refills: 0 | Status: ACTIVE | COMMUNITY

## 2019-11-20 RX ORDER — OXYCODONE AND ACETAMINOPHEN 5; 325 MG/1; MG/1
5-325 TABLET ORAL
Qty: 30 | Refills: 0 | Status: DISCONTINUED | COMMUNITY
Start: 2019-10-10 | End: 2019-11-20

## 2019-11-20 RX ORDER — ATORVASTATIN CALCIUM 40 MG/1
40 TABLET, FILM COATED ORAL
Qty: 30 | Refills: 0 | Status: ACTIVE | COMMUNITY

## 2019-11-20 RX ORDER — DOXYCYCLINE HYCLATE 100 MG/1
100 TABLET ORAL
Refills: 0 | Status: DISCONTINUED | COMMUNITY
End: 2019-11-20

## 2019-11-20 RX ORDER — ASPIRIN 81 MG/1
81 TABLET, CHEWABLE ORAL
Qty: 90 | Refills: 1 | Status: ACTIVE | COMMUNITY

## 2019-11-20 NOTE — ASSESSMENT
[FreeTextEntry1] : 60 y/o male with CAD s/p CABG with bilateral severe claudication Arlette IIB \par NANCY shows severe ischemia bilaterally (Right 0.35 and left 0.3) \par Patient currently on medical therapy ( cilostazol 100 mg) , lifestyle limiting claudication\par will order arterial U/S of both LE \par Carotid U/S probably done prior to CABG ( will check [facility] records) \par will likely need peripheral angiogram and angioplasty \par

## 2019-11-20 NOTE — HISTORY OF PRESENT ILLNESS
[FreeTextEntry1] : 60y/o male with PMHx of DM on oral therapy , hyperlipidemia, ESRD on HD as well as CAD s/p CABG x 3 , who is referred to me because of bilateral LE claudication. He started to have his symptoms worsened after having CABG in 10/2019. He gets bilateral leg and foot cramp when he walks to get the mail from his driveway and they are relieved by resting. \par No ulcers, but c/o of numbness and tingling worse in the left leg. \par + CVA 3-4 years (?syncope presentation) \par

## 2019-11-20 NOTE — PHYSICAL EXAM
[General Appearance - Well Developed] : well developed [Normal Appearance] : normal appearance [General Appearance - Well Nourished] : well nourished [Normal Conjunctiva] : the conjunctiva exhibited no abnormalities [Normal Jugular Venous A Waves Present] : normal jugular venous A waves present [No Oral Pallor] : no oral pallor [No Oral Cyanosis] : no oral cyanosis [Heart Sounds] : normal S1 and S2 [Normal Jugular Venous V Waves Present] : normal jugular venous V waves present [Murmurs] : no murmurs present [Edema] : no peripheral edema present [Abdomen Soft] : soft [Auscultation Breath Sounds / Voice Sounds] : lungs were clear to auscultation bilaterally [Abdomen Tenderness] : non-tender [Nail Clubbing] : no clubbing of the fingernails [Cyanosis, Localized] : no localized cyanosis [Skin Turgor] : normal skin turgor [Oriented To Time, Place, And Person] : oriented to person, place, and time [] : no rash [FreeTextEntry1] : walk with cane

## 2019-11-27 DIAGNOSIS — Z71.89 OTHER SPECIFIED COUNSELING: ICD-10-CM

## 2019-12-01 ENCOUNTER — OUTPATIENT (OUTPATIENT)
Dept: OUTPATIENT SERVICES | Facility: HOSPITAL | Age: 59
LOS: 1 days | End: 2019-12-01

## 2019-12-01 ENCOUNTER — INPATIENT (INPATIENT)
Facility: HOSPITAL | Age: 59
LOS: 7 days | Discharge: HOME | End: 2019-12-09
Payer: MEDICARE

## 2019-12-01 DIAGNOSIS — Z95.1 PRESENCE OF AORTOCORONARY BYPASS GRAFT: Chronic | ICD-10-CM

## 2019-12-01 PROCEDURE — 70450 CT HEAD/BRAIN W/O DYE: CPT | Mod: 26

## 2019-12-01 PROCEDURE — 71045 X-RAY EXAM CHEST 1 VIEW: CPT | Mod: 26

## 2019-12-01 PROCEDURE — 99285 EMERGENCY DEPT VISIT HI MDM: CPT

## 2019-12-02 ENCOUNTER — OUTPATIENT (OUTPATIENT)
Dept: OUTPATIENT SERVICES | Facility: HOSPITAL | Age: 59
LOS: 1 days | End: 2019-12-02

## 2019-12-02 DIAGNOSIS — Z95.1 PRESENCE OF AORTOCORONARY BYPASS GRAFT: Chronic | ICD-10-CM

## 2019-12-03 ENCOUNTER — OUTPATIENT (OUTPATIENT)
Dept: OUTPATIENT SERVICES | Facility: HOSPITAL | Age: 59
LOS: 1 days | End: 2019-12-03

## 2019-12-03 DIAGNOSIS — Z95.1 PRESENCE OF AORTOCORONARY BYPASS GRAFT: Chronic | ICD-10-CM

## 2019-12-04 ENCOUNTER — OUTPATIENT (OUTPATIENT)
Dept: OUTPATIENT SERVICES | Facility: HOSPITAL | Age: 59
LOS: 1 days | End: 2019-12-04

## 2019-12-04 DIAGNOSIS — Z95.1 PRESENCE OF AORTOCORONARY BYPASS GRAFT: Chronic | ICD-10-CM

## 2019-12-05 ENCOUNTER — OUTPATIENT (OUTPATIENT)
Dept: OUTPATIENT SERVICES | Facility: HOSPITAL | Age: 59
LOS: 1 days | End: 2019-12-05

## 2019-12-05 DIAGNOSIS — Z95.1 PRESENCE OF AORTOCORONARY BYPASS GRAFT: Chronic | ICD-10-CM

## 2019-12-06 ENCOUNTER — OUTPATIENT (OUTPATIENT)
Dept: OUTPATIENT SERVICES | Facility: HOSPITAL | Age: 59
LOS: 1 days | End: 2019-12-06

## 2019-12-06 DIAGNOSIS — Z95.1 PRESENCE OF AORTOCORONARY BYPASS GRAFT: Chronic | ICD-10-CM

## 2019-12-07 ENCOUNTER — OUTPATIENT (OUTPATIENT)
Dept: OUTPATIENT SERVICES | Facility: HOSPITAL | Age: 59
LOS: 1 days | End: 2019-12-07

## 2019-12-07 DIAGNOSIS — Z95.1 PRESENCE OF AORTOCORONARY BYPASS GRAFT: Chronic | ICD-10-CM

## 2019-12-08 ENCOUNTER — OUTPATIENT (OUTPATIENT)
Dept: OUTPATIENT SERVICES | Facility: HOSPITAL | Age: 59
LOS: 1 days | End: 2019-12-08

## 2019-12-08 DIAGNOSIS — Z95.1 PRESENCE OF AORTOCORONARY BYPASS GRAFT: Chronic | ICD-10-CM

## 2019-12-09 ENCOUNTER — OUTPATIENT (OUTPATIENT)
Dept: OUTPATIENT SERVICES | Facility: HOSPITAL | Age: 59
LOS: 1 days | End: 2019-12-09

## 2019-12-09 DIAGNOSIS — Z95.1 PRESENCE OF AORTOCORONARY BYPASS GRAFT: Chronic | ICD-10-CM

## 2020-01-01 ENCOUNTER — OUTPATIENT (OUTPATIENT)
Dept: OUTPATIENT SERVICES | Facility: HOSPITAL | Age: 60
LOS: 1 days | End: 2020-01-01
Payer: MEDICARE

## 2020-01-01 DIAGNOSIS — Z95.1 PRESENCE OF AORTOCORONARY BYPASS GRAFT: Chronic | ICD-10-CM

## 2020-01-27 DIAGNOSIS — Z71.89 OTHER SPECIFIED COUNSELING: ICD-10-CM

## 2020-02-04 ENCOUNTER — APPOINTMENT (OUTPATIENT)
Dept: CARDIOLOGY | Facility: CLINIC | Age: 60
End: 2020-02-04
Payer: MEDICARE

## 2020-02-04 VITALS
HEIGHT: 64 IN | HEART RATE: 86 BPM | OXYGEN SATURATION: 99 % | BODY MASS INDEX: 34.15 KG/M2 | DIASTOLIC BLOOD PRESSURE: 60 MMHG | SYSTOLIC BLOOD PRESSURE: 100 MMHG | WEIGHT: 200 LBS

## 2020-02-04 PROCEDURE — 99215 OFFICE O/P EST HI 40 MIN: CPT

## 2020-02-04 NOTE — DISCUSSION/SUMMARY
[FreeTextEntry1] : HFrEF:  Continue long-acting metoprolol succinate 100 mg daily (high risk medication with no signs of toxicity). The patient will need repeat echocardiography.\par \par Hypertension: appears controlled continue current medical therapy.\par \par Claudication: ABIs performed on 11/26/2019 demonstrated severe bilateral lower extremity peripheral arterial disease. Continue DAPT, statin. Continue cilostazol. Following with vascular specialist Dr. Leone for arterial dopplers and further evaluation and management. \par \par CAD:  cont asa/plavix for now.  Cath done for acute  CHF, trop positive w ESRD (trop 0.123) found w 3 vd.  s/p CABG\par \par Follow up in 4 months.

## 2020-02-04 NOTE — HISTORY OF PRESENT ILLNESS
[FreeTextEntry1] : Historical Perspective:\par 59 year old male He has end-stage renal disease on hemodialysis. The patient recently underwent coronary artery bypass grafting. The patient has not had any exertional chest discomfort or shortness of breath since his surgery. He ambulates slowly. He can walk about 30-60 seconds. When he walks often he is limited by calf burning and tightness. The patient had left ventricular dysfunction prior to bypass surgery.\par \par Current Health Status:\par Continues with claudication. Saw Dr. Leone and underwent arterial dopplers of the lower extremities. Severe PAD. Going to follow up with Dr. Leone. Will likely need angiography.

## 2020-02-04 NOTE — PHYSICAL EXAM
[General Appearance - Well Developed] : well developed [Normal Appearance] : normal appearance [Well Groomed] : well groomed [No Deformities] : no deformities [General Appearance - Well Nourished] : well nourished [General Appearance - In No Acute Distress] : no acute distress [Eyelids - No Xanthelasma] : the eyelids demonstrated no xanthelasmas [Normal Conjunctiva] : the conjunctiva exhibited no abnormalities [No Oral Pallor] : no oral pallor [Normal Oral Mucosa] : normal oral mucosa [Normal Jugular Venous A Waves Present] : normal jugular venous A waves present [No Oral Cyanosis] : no oral cyanosis [Normal Jugular Venous V Waves Present] : normal jugular venous V waves present [No Jugular Venous Tejada A Waves] : no jugular venous tejada A waves [Respiration, Rhythm And Depth] : normal respiratory rhythm and effort [Exaggerated Use Of Accessory Muscles For Inspiration] : no accessory muscle use [Heart Rate And Rhythm] : heart rate and rhythm were normal [Auscultation Breath Sounds / Voice Sounds] : lungs were clear to auscultation bilaterally [Heart Sounds] : normal S1 and S2 [Murmurs] : no murmurs present [Edema] : no peripheral edema present [Abdomen Mass (___ Cm)] : no abdominal mass palpated [Abnormal Walk] : normal gait [Nail Clubbing] : no clubbing of the fingernails [Gait - Sufficient For Exercise Testing] : the gait was sufficient for exercise testing [Cyanosis, Localized] : no localized cyanosis [Petechial Hemorrhages (___cm)] : no petechial hemorrhages [Skin Color & Pigmentation] : normal skin color and pigmentation [] : no rash [Skin Lesions] : no skin lesions [No Venous Stasis] : no venous stasis [Oriented To Time, Place, And Person] : oriented to person, place, and time [No Skin Ulcers] : no skin ulcer [No Xanthoma] : no  xanthoma was observed [Mood] : the mood was normal [No Anxiety] : not feeling anxious [Affect] : the affect was normal [FreeTextEntry1] : 146/70, 90, 14

## 2020-02-26 ENCOUNTER — OUTPATIENT (OUTPATIENT)
Dept: OUTPATIENT SERVICES | Facility: HOSPITAL | Age: 60
LOS: 1 days | End: 2020-02-26

## 2020-02-26 ENCOUNTER — INPATIENT (INPATIENT)
Facility: HOSPITAL | Age: 60
LOS: 8 days | Discharge: HOME | End: 2020-03-06
Admitting: STUDENT IN AN ORGANIZED HEALTH CARE EDUCATION/TRAINING PROGRAM
Payer: MEDICARE

## 2020-02-26 DIAGNOSIS — Z95.1 PRESENCE OF AORTOCORONARY BYPASS GRAFT: Chronic | ICD-10-CM

## 2020-02-26 PROCEDURE — 71045 X-RAY EXAM CHEST 1 VIEW: CPT | Mod: 26

## 2020-02-26 PROCEDURE — 72170 X-RAY EXAM OF PELVIS: CPT | Mod: 26

## 2020-02-26 PROCEDURE — 71260 CT THORAX DX C+: CPT | Mod: 26

## 2020-02-26 PROCEDURE — 70450 CT HEAD/BRAIN W/O DYE: CPT | Mod: 26

## 2020-02-26 PROCEDURE — 99291 CRITICAL CARE FIRST HOUR: CPT

## 2020-02-26 PROCEDURE — 72125 CT NECK SPINE W/O DYE: CPT | Mod: 26

## 2020-02-26 PROCEDURE — 74177 CT ABD & PELVIS W/CONTRAST: CPT | Mod: 26

## 2020-02-27 ENCOUNTER — OUTPATIENT (OUTPATIENT)
Dept: OUTPATIENT SERVICES | Facility: HOSPITAL | Age: 60
LOS: 1 days | End: 2020-02-27

## 2020-02-27 DIAGNOSIS — Z95.1 PRESENCE OF AORTOCORONARY BYPASS GRAFT: Chronic | ICD-10-CM

## 2020-02-28 ENCOUNTER — OUTPATIENT (OUTPATIENT)
Dept: OUTPATIENT SERVICES | Facility: HOSPITAL | Age: 60
LOS: 1 days | End: 2020-02-28

## 2020-02-28 DIAGNOSIS — Z95.1 PRESENCE OF AORTOCORONARY BYPASS GRAFT: Chronic | ICD-10-CM

## 2020-02-29 ENCOUNTER — OUTPATIENT (OUTPATIENT)
Dept: OUTPATIENT SERVICES | Facility: HOSPITAL | Age: 60
LOS: 1 days | End: 2020-02-29

## 2020-02-29 DIAGNOSIS — Z95.1 PRESENCE OF AORTOCORONARY BYPASS GRAFT: Chronic | ICD-10-CM

## 2020-03-01 ENCOUNTER — OUTPATIENT (OUTPATIENT)
Dept: OUTPATIENT SERVICES | Facility: HOSPITAL | Age: 60
LOS: 1 days | End: 2020-03-01

## 2020-03-01 DIAGNOSIS — Z95.1 PRESENCE OF AORTOCORONARY BYPASS GRAFT: Chronic | ICD-10-CM

## 2020-03-02 ENCOUNTER — OUTPATIENT (OUTPATIENT)
Dept: OUTPATIENT SERVICES | Facility: HOSPITAL | Age: 60
LOS: 1 days | End: 2020-03-02

## 2020-03-02 DIAGNOSIS — Z95.1 PRESENCE OF AORTOCORONARY BYPASS GRAFT: Chronic | ICD-10-CM

## 2020-03-03 ENCOUNTER — OUTPATIENT (OUTPATIENT)
Dept: OUTPATIENT SERVICES | Facility: HOSPITAL | Age: 60
LOS: 1 days | End: 2020-03-03

## 2020-03-03 DIAGNOSIS — Z95.1 PRESENCE OF AORTOCORONARY BYPASS GRAFT: Chronic | ICD-10-CM

## 2020-03-03 PROCEDURE — 93931 UPPER EXTREMITY STUDY: CPT | Mod: 26,LT

## 2020-03-04 ENCOUNTER — OUTPATIENT (OUTPATIENT)
Dept: OUTPATIENT SERVICES | Facility: HOSPITAL | Age: 60
LOS: 1 days | End: 2020-03-04

## 2020-03-04 DIAGNOSIS — Z95.1 PRESENCE OF AORTOCORONARY BYPASS GRAFT: Chronic | ICD-10-CM

## 2020-03-05 ENCOUNTER — OUTPATIENT (OUTPATIENT)
Dept: OUTPATIENT SERVICES | Facility: HOSPITAL | Age: 60
LOS: 1 days | End: 2020-03-05

## 2020-03-05 DIAGNOSIS — Z95.1 PRESENCE OF AORTOCORONARY BYPASS GRAFT: Chronic | ICD-10-CM

## 2020-03-06 ENCOUNTER — OUTPATIENT (OUTPATIENT)
Dept: OUTPATIENT SERVICES | Facility: HOSPITAL | Age: 60
LOS: 1 days | End: 2020-03-06

## 2020-03-06 DIAGNOSIS — Z95.1 PRESENCE OF AORTOCORONARY BYPASS GRAFT: Chronic | ICD-10-CM

## 2020-04-29 ENCOUNTER — APPOINTMENT (OUTPATIENT)
Dept: CARDIOLOGY | Facility: CLINIC | Age: 60
End: 2020-04-29
Payer: MEDICARE

## 2020-04-29 PROCEDURE — 93306 TTE W/DOPPLER COMPLETE: CPT

## 2020-05-07 ENCOUNTER — APPOINTMENT (OUTPATIENT)
Dept: CARDIOLOGY | Facility: CLINIC | Age: 60
End: 2020-05-07
Payer: MEDICARE

## 2020-05-07 PROCEDURE — 99442: CPT

## 2020-05-07 NOTE — HISTORY OF PRESENT ILLNESS
[FreeTextEntry1] : Historical Perspective:\par 59 year old male He has end-stage renal disease on hemodialysis. The patient recently underwent coronary artery bypass grafting. The patient has not had any exertional chest discomfort or shortness of breath since his surgery. He ambulates slowly. He can walk about 30-60 seconds. When he walks often he is limited by calf burning and tightness. The patient had left ventricular dysfunction prior to bypass surgery.\par \par Current Health Status:\par This visit was performed transtelephonic as the risk of performing a face to face visit outweighed the benefits given the COVID-19 Pandemic. Continues with claudication. Saw Dr. Leone and underwent arterial dopplers of the lower extremities. Severe PAD. Going to follow up with Dr. Leone. Will likely need angiography. Patient denies any open wounds on his legs or feet.  [Verbal consent obtained from patient] : the patient, [unfilled]

## 2020-05-07 NOTE — DISCUSSION/SUMMARY
[FreeTextEntry1] : HFrEF:  Ischemic cardiomyopathy. Patient revascularized with CABG. Continue long-acting metoprolol succinate 100 mg daily (high risk medication with no signs of toxicity) and Losartan 100mg daily. Echocardiogram on 4/29/2020 revealed improved LV EF at 60-65%.\par \par Hypertension: appears controlled continue current medical therapy.\par \par Claudication: ABIs performed on 11/26/2019 demonstrated severe bilateral lower extremity peripheral arterial disease. Continue DAPT, statin. Continue cilostazol. Following with vascular specialist Dr. Leone for arterial dopplers and further evaluation and management. \par \par CAD:  cont asa/plavix for now.  Cath done for acute  CHF, trop positive w ESRD (trop 0.123) found with 3 vd.  s/p CABG\par \par Follow up in 4 months.

## 2020-09-22 ENCOUNTER — APPOINTMENT (OUTPATIENT)
Dept: CARDIOLOGY | Facility: CLINIC | Age: 60
End: 2020-09-22

## 2020-10-22 ENCOUNTER — NON-APPOINTMENT (OUTPATIENT)
Age: 60
End: 2020-10-22

## 2020-10-22 ENCOUNTER — APPOINTMENT (OUTPATIENT)
Dept: CARDIOLOGY | Facility: CLINIC | Age: 60
End: 2020-10-22
Payer: MEDICARE

## 2020-10-22 VITALS
HEIGHT: 64 IN | DIASTOLIC BLOOD PRESSURE: 70 MMHG | HEART RATE: 75 BPM | SYSTOLIC BLOOD PRESSURE: 118 MMHG | WEIGHT: 210 LBS | BODY MASS INDEX: 35.85 KG/M2 | TEMPERATURE: 98.4 F | OXYGEN SATURATION: 96 %

## 2020-10-22 VITALS
OXYGEN SATURATION: 96 % | HEIGHT: 64 IN | BODY MASS INDEX: 35.85 KG/M2 | SYSTOLIC BLOOD PRESSURE: 118 MMHG | TEMPERATURE: 98.4 F | DIASTOLIC BLOOD PRESSURE: 70 MMHG | HEART RATE: 75 BPM | WEIGHT: 210 LBS | RESPIRATION RATE: 12 BRPM

## 2020-10-22 PROCEDURE — 93000 ELECTROCARDIOGRAM COMPLETE: CPT

## 2020-10-22 PROCEDURE — 99215 OFFICE O/P EST HI 40 MIN: CPT | Mod: 25

## 2020-10-22 NOTE — PHYSICAL EXAM
[General Appearance - Well Developed] : well developed [Normal Appearance] : normal appearance [Well Groomed] : well groomed [General Appearance - Well Nourished] : well nourished [No Deformities] : no deformities [General Appearance - In No Acute Distress] : no acute distress [Normal Conjunctiva] : the conjunctiva exhibited no abnormalities [Eyelids - No Xanthelasma] : the eyelids demonstrated no xanthelasmas [Normal Oral Mucosa] : normal oral mucosa [No Oral Pallor] : no oral pallor [No Oral Cyanosis] : no oral cyanosis [Respiration, Rhythm And Depth] : normal respiratory rhythm and effort [Exaggerated Use Of Accessory Muscles For Inspiration] : no accessory muscle use [Auscultation Breath Sounds / Voice Sounds] : lungs were clear to auscultation bilaterally [Heart Rate And Rhythm] : heart rate and rhythm were normal [Heart Sounds] : normal S1 and S2 [Edema] : no peripheral edema present [Nail Clubbing] : no clubbing of the fingernails [Cyanosis, Localized] : no localized cyanosis [Petechial Hemorrhages (___cm)] : no petechial hemorrhages [Skin Color & Pigmentation] : normal skin color and pigmentation [] : no rash [No Venous Stasis] : no venous stasis [Skin Lesions] : no skin lesions [No Skin Ulcers] : no skin ulcer [No Xanthoma] : no  xanthoma was observed [Oriented To Time, Place, And Person] : oriented to person, place, and time [Affect] : the affect was normal [Mood] : the mood was normal [No Anxiety] : not feeling anxious [FreeTextEntry1] : ambulates slowly with cane

## 2020-10-22 NOTE — DISCUSSION/SUMMARY
[FreeTextEntry1] : 1. Hx of HFrEF/Coronary Artery Disease:  Ischemic cardiomyopathy.  Patient revascularized with CABG and placed on medical therapy. EF normalized on most recent echocardiogram from 4/29/2020.   Continue long-acting metoprolol succinate 100 mg daily (high risk medication with no signs of toxicity) and Losartan 100mg daily. \par \par 2. Hypertension: appears controlled continue current medical therapy.\par \par 3. PAD/Claudication: ABIs performed on 11/26/2019 demonstrated severe bilateral lower extremity peripheral arterial disease. Continue DAPT, statin. Continue cilostazol. Following with vascular specialist Dr. Stanley . Patient self reports intervention on left lower extremity and pending intervention on right lower extremity. Will attempt to obtain records. \par \par 4. HLD: on atorvastatin 40mg daily. Goal LDL is less than 70. \par \par 5. Aortic Valve Insufficiency/Mitral Valve Regurgitation: mild for both on echocardiogram from April 2020. Periodic echo surveillance. \par \par Follow up in 4 months.

## 2020-10-22 NOTE — HISTORY OF PRESENT ILLNESS
[FreeTextEntry1] : Historical Perspective:\par 60 year old male with PMHx of end-stage renal disease on hemodialysis, CAD s/p CABG x 3 (LIMA-->LAD, SVG-->PDA, SVG-->OM on 9/13/2019), PAD (follows with Dr. Stanley and s/p intervention on left lower extremity, pending right lower extremity intervention, HTN, HLD.\par \par Current Health Status:\par Patient with no chest pain, SOB, or palpitations. No hospitalizations since seeing me last. Remains compliant with his medications and reports no adverse effects.\par

## 2021-02-12 ENCOUNTER — APPOINTMENT (OUTPATIENT)
Dept: CARDIOLOGY | Facility: CLINIC | Age: 61
End: 2021-02-12
Payer: MEDICARE

## 2021-02-12 VITALS
TEMPERATURE: 97.8 F | BODY MASS INDEX: 37.56 KG/M2 | HEART RATE: 68 BPM | SYSTOLIC BLOOD PRESSURE: 112 MMHG | WEIGHT: 220 LBS | DIASTOLIC BLOOD PRESSURE: 64 MMHG | OXYGEN SATURATION: 100 % | HEIGHT: 64 IN

## 2021-02-12 PROCEDURE — 99215 OFFICE O/P EST HI 40 MIN: CPT

## 2021-02-12 NOTE — DISCUSSION/SUMMARY
[FreeTextEntry1] : 1. Hx of HFrEF/Coronary Artery Disease:  Ischemic cardiomyopathy.  Patient revascularized with CABG and placed on medical therapy. EF normalized on most recent echocardiogram from 4/29/2020.   Continue long-acting metoprolol succinate 100 mg daily (high risk medication with no signs of toxicity) and Losartan 100mg daily. \par \par 2. Hypertension: appears controlled continue current medical therapy.\par \par 3. PAD/Claudication: ABIs performed on 11/26/2019 demonstrated severe bilateral lower extremity peripheral arterial disease. Continue DAPT, statin. Continue cilostazol. Following with vascular specialist Dr. Stanley . Patient self reports intervention on left lower extremity and pending intervention on right lower extremity. Will attempt to obtain records. \par \par 4. HLD: on atorvastatin 40mg daily. Goal LDL is less than 70. \par \par 5. Aortic Valve Insufficiency/Mitral Valve Regurgitation: mild for both on echocardiogram from April 2020. Periodic echo surveillance. \par \par Follow up in 6 months.

## 2021-02-12 NOTE — PHYSICAL EXAM
[General Appearance - Well Developed] : well developed [Normal Appearance] : normal appearance [Well Groomed] : well groomed [General Appearance - Well Nourished] : well nourished [No Deformities] : no deformities [General Appearance - In No Acute Distress] : no acute distress [Normal Conjunctiva] : the conjunctiva exhibited no abnormalities [Eyelids - No Xanthelasma] : the eyelids demonstrated no xanthelasmas [Normal Oral Mucosa] : normal oral mucosa [No Oral Pallor] : no oral pallor [No Oral Cyanosis] : no oral cyanosis [Respiration, Rhythm And Depth] : normal respiratory rhythm and effort [Auscultation Breath Sounds / Voice Sounds] : lungs were clear to auscultation bilaterally [Exaggerated Use Of Accessory Muscles For Inspiration] : no accessory muscle use [Heart Rate And Rhythm] : heart rate and rhythm were normal [Heart Sounds] : normal S1 and S2 [Edema] : no peripheral edema present [Nail Clubbing] : no clubbing of the fingernails [Cyanosis, Localized] : no localized cyanosis [Petechial Hemorrhages (___cm)] : no petechial hemorrhages [Skin Color & Pigmentation] : normal skin color and pigmentation [] : no rash [No Venous Stasis] : no venous stasis [Skin Lesions] : no skin lesions [No Skin Ulcers] : no skin ulcer [No Xanthoma] : no  xanthoma was observed [Oriented To Time, Place, And Person] : oriented to person, place, and time [Affect] : the affect was normal [Mood] : the mood was normal [No Anxiety] : not feeling anxious [FreeTextEntry1] : ambulates slowly with cane

## 2021-03-11 ENCOUNTER — OUTPATIENT (OUTPATIENT)
Dept: OUTPATIENT SERVICES | Facility: HOSPITAL | Age: 61
LOS: 1 days | End: 2021-03-11

## 2021-03-11 DIAGNOSIS — Z95.1 PRESENCE OF AORTOCORONARY BYPASS GRAFT: Chronic | ICD-10-CM

## 2021-03-12 ENCOUNTER — OUTPATIENT (OUTPATIENT)
Dept: OUTPATIENT SERVICES | Facility: HOSPITAL | Age: 61
LOS: 1 days | End: 2021-03-12
Payer: MEDICARE

## 2021-03-12 DIAGNOSIS — Z95.1 PRESENCE OF AORTOCORONARY BYPASS GRAFT: Chronic | ICD-10-CM

## 2021-03-12 PROCEDURE — 74174 CTA ABD&PLVS W/CONTRAST: CPT | Mod: 26

## 2021-04-27 ENCOUNTER — APPOINTMENT (OUTPATIENT)
Dept: CARDIOLOGY | Facility: CLINIC | Age: 61
End: 2021-04-27
Payer: MEDICARE

## 2021-04-27 PROCEDURE — 93306 TTE W/DOPPLER COMPLETE: CPT

## 2021-04-27 PROCEDURE — 99072 ADDL SUPL MATRL&STAF TM PHE: CPT

## 2021-08-19 ENCOUNTER — APPOINTMENT (OUTPATIENT)
Dept: CARDIOLOGY | Facility: CLINIC | Age: 61
End: 2021-08-19

## 2021-10-01 NOTE — PROGRESS NOTE ADULT - PROVIDER SPECIALTY LIST ADULT
Nephrology 8 y/o obese male PMH asthma c/o cough,  nasal congestion, fever and mild CP with cough for 2 to 3 days , has albuterol neb at home , Lungs CTA and mild reproducible CP. dx URI plan RVP with COVID, throat rapid strep if negative sent throat cx and po motrin for pain d/c home w/ instructions f/u w/ PMD 6 y/o obese male PMH asthma c/o cough,  nasal congestion, fever and mild CP with cough for 2 to 3 days , has albuterol neb at home , Lungs CTA and mild reproducible CP. plan RVP with COVID, throat rapid strep positive and po motrin and amoxicillin  dx strep pharyngitis d/c home w/ instructions f/u w/ PMD 8 y/o obese male PMH asthma and allergic to PCN (rash)  c/o cough,  nasal congestion, fever and mild CP with cough for 2 to 3 days , has albuterol neb at home , Lungs CTA and mild reproducible CP. plan RVP with COVID, throat rapid strep positive and po motrin and Zithromax   dx strep pharyngitis d/c home w/ instructions f/u w/ PMD

## 2021-11-01 PROCEDURE — G9005: CPT

## 2022-11-16 NOTE — ED ADULT TRIAGE NOTE - TEMPERATURE IN FAHRENHEIT (DEGREES F)
Comment: Right posterior shoulder Detail Level: Simple Render Risk Assessment In Note?: no Comment: Including lesion of concern on the right flank. 99.1 Comment: Including dry skin of concern on the scalp.

## 2022-12-24 NOTE — PATIENT PROFILE ADULT - FUNCTIONAL SCREEN CURRENT LEVEL: EATING, MLM
no loss of consciousness, no gait abnormality, no headache, no sensory deficits, and no weakness.
0 = independent

## 2023-02-28 NOTE — PROGRESS NOTE ADULT - SUBJECTIVE AND OBJECTIVE BOX
[facility] DIVISION OF KIDNEY DISEASES AND HYPERTENSION -- FOLLOW UP NOTE  --------------------------------------------------------------------------------  Chief Complaint: ESRD HD    24 hour events/subjective:  Seen/examined;   HD today;      PAST HISTORY  --------------------------------------------------------------------------------  No significant changes to PMH, PSH, FHx, SHx, unless otherwise noted    ALLERGIES & MEDICATIONS  --------------------------------------------------------------------------------  Allergies    lactose (Flatulence (Severe); Diarrhea (Severe); Stomach Upset (Severe); Nausea (Severe))  No Known Drug Allergies    Intolerances    OHS (Unknown)    Standing Inpatient Medications  aspirin enteric coated 325 milliGRAM(s) Oral daily  atorvastatin 40 milliGRAM(s) Oral at bedtime  calcium carbonate   1250 mG (OsCal) 1 Tablet(s) Oral two times a day  clopidogrel Tablet 75 milliGRAM(s) Oral daily  dextrose 5%. 1000 milliLiter(s) IV Continuous <Continuous>  dextrose 50% Injectable 12.5 Gram(s) IV Push once  dextrose 50% Injectable 25 Gram(s) IV Push once  dextrose 50% Injectable 25 Gram(s) IV Push once  docusate sodium 100 milliGRAM(s) Oral three times a day  insulin glargine Injectable (LANTUS) 10 Unit(s) SubCutaneous at bedtime  insulin lispro (HumaLOG) corrective regimen sliding scale   SubCutaneous Before meals and at bedtime  metoprolol tartrate 25 milliGRAM(s) Oral three times a day  piperacillin/tazobactam IVPB.. 3.375 Gram(s) IV Intermittent every 12 hours  povidone iodine 10% Solution 1 Application(s) Topical three times a day  sodium chloride 0.9% lock flush 3 milliLiter(s) IV Push every 8 hours    PRN Inpatient Medications  acetaminophen   Tablet .. 650 milliGRAM(s) Oral every 6 hours PRN  dextrose 40% Gel 15 Gram(s) Oral once PRN  glucagon  Injectable 1 milliGRAM(s) IntraMuscular once PRN  oxyCODONE    5 mG/acetaminophen 325 mG 1 Tablet(s) Oral every 4 hours PRN      REVIEW OF SYSTEMS  --------------------------------------------------------------------------------  Gen: No weight changes, fatigue, fevers/chills, weakness  Skin: No rashes  Head/Eyes/Ears/Mouth: No headache; Normal hearing; Normal vision w/o blurriness; No sinus pain/discomfort, sore throat  Respiratory: No dyspnea, cough, wheezing, hemoptysis  CV: No chest pain, PND, orthopnea  GI: No abdominal pain, diarrhea, constipation, nausea, vomiting, melena, hematochezia  : No increased frequency, dysuria, hematuria, nocturia  MSK: No joint pain/swelling; no back pain; no edema  Neuro: No dizziness/lightheadedness, weakness, seizures, numbness, tingling  Heme: No easy bruising or bleeding  Endo: No heat/cold intolerance  Psych: No significant nervousness, anxiety, stress, depression    All other systems were reviewed and are negative, except as noted.    VITALS/PHYSICAL EXAM  --------------------------------------------------------------------------------  T(C): 36.7 (09-25-19 @ 08:52), Max: 37.2 (09-24-19 @ 21:22)  HR: 80 (09-25-19 @ 08:52) (73 - 80)  BP: 112/72 (09-25-19 @ 08:52) (112/72 - 131/77)  RR: 18 (09-25-19 @ 08:52) (16 - 18)  SpO2: 96% (09-25-19 @ 08:52) (95% - 96%)  Wt(kg): --  Height (cm): 165.1 (09-23-19 @ 20:47)  Weight (kg): 87.3 (09-23-19 @ 20:47)  BMI (kg/m2): 32 (09-23-19 @ 20:47)  BSA (m2): 1.95 (09-23-19 @ 20:47)      09-24-19 @ 07:01  -  09-25-19 @ 07:00  --------------------------------------------------------  IN: 970 mL / OUT: 1075 mL / NET: -105 mL    09-25-19 @ 07:01  -  09-25-19 @ 14:03  --------------------------------------------------------  IN: 240 mL / OUT: 0 mL / NET: 240 mL      Physical Exam:  	Gen: NAD   	HEENT: PERRL, supple neck, clear oropharynx  	Pulm: CTA B/L  	CV: RRR, S1S2; no rub  	Back: No spinal or CVA tenderness; no sacral edema  	Abd: +BS, soft, nontender/nondistended  	: No suprapubic tenderness  	UE: Warm, FROM, no clubbing, intact strength; no edema; no asterixis  	LE: Warm, FROM, no clubbing, intact strength; no edema  	Neuro: No focal deficits, intact gait  	Psych: Normal affect and mood  	Skin: Warm, without rashes  	Vascular access:    LABS/STUDIES  --------------------------------------------------------------------------------              8.1    12.31 >-----------<  332      [09-25-19 @ 06:24]              25.9     134  |  93  |  42.0  ----------------------------<  90      [09-25-19 @ 06:24]  4.2   |  28.0  |  5.18        Ca     7.9     [09-25-19 @ 06:24]      Mg     1.9     [09-25-19 @ 06:24]      Phos  3.8     [09-25-19 @ 06:24]    TPro  5.9  /  Alb  2.9  /  TBili  0.6  /  DBili  x   /  AST  15  /  ALT  16  /  AlkPhos  73  [09-25-19 @ 06:24]    PT/INR: PT 13.1 , INR 1.13       [09-23-19 @ 14:42]  PTT: 27.4       [09-23-19 @ 14:42]      Creatinine Trend:  SCr 5.18 [09-25 @ 06:24]  SCr 4.26 [09-24 @ 06:01]  SCr 6.21 [09-23 @ 14:42]  SCr 4.20 [09-18 @ 06:14]  SCr 3.77 [09-17 @ 06:09]        Iron 37, TIBC 204, %sat 18      [08-20-19 @ 15:20]  Ferritin 305      [08-20-19 @ 15:20]  PTH -- (Ca 6.9)      [08-20-19 @ 23:33]   456  Vitamin D (25OH) 11.3      [08-21-19 @ 00:00]  HbA1c 8.4      [08-20-19 @ 13:09]  TSH 1.97      [08-20-19 @ 13:09]  Lipid: chol 209, , HDL 32,       [08-20-19 @ 13:09]
CC: ESRD ,  Sternal wound drainage     HPI:  58y/o man with PMH of DM2, HTN, stroke, ESRD on HD trough R chest permacath s/p AVF placement on 8/26 in lef arm, CAD s/p CABG on 9/13, was admitted with mid sternal wound discharge, nausea and vomiting. He states that he had minimal discharge from the beginning after surgery.   Since admission feels better, no more vomiting but had fever 101.8.     PAST MEDICAL & SURGICAL HISTORY:  S/P arteriovenous (AV) fistula creation  S/P CABG x 3  HLD (hyperlipidemia)  Cerebrovascular accident (CVA): Poor historian, unaware of date  DM2 (diabetes mellitus, type 2)  HTN (hypertension)  S/P CABG x 3    Social Hx: Ex smoker    FAMILY HISTORY:  FH: HTN (hypertension)  FH: CAD (coronary artery disease)    Allergies  lactose (Flatulence (Severe); Diarrhea (Severe); Stomach Upset (Severe); Nausea (Severe))  No Known Drug Allergies    Antibiotics:  noone     REVIEW OF SYSTEMS:  CONSTITUTIONAL:  No Fever or chills  HEENT:  No diplopia or blurred vision.  No sore throat or runny nose.  CARDIOVASCULAR:  No chest pain or SOB.  RESPIRATORY:  No cough, shortness of breath, PND or orthopnea.  GASTROINTESTINAL:  No nausea, vomiting or diarrhea.  GENITOURINARY:  No dysuria, frequency or urgency. No Blood in urine  MUSCULOSKELETAL:  no joint aches, no muscle pain  SKIN:  No change in skin, hair or nails.  NEUROLOGIC:  No paresthesias, fasciculations, seizures or weakness.  PSYCHIATRIC:  No disorder of thought or mood.  ENDOCRINE:  No heat or cold intolerance, polyuria or polydipsia.  HEMATOLOGICAL:  No easy bruising or bleeding.     Physical Exam:  Vital Signs Last 24 Hrs  T(C): 36.9 (24 Sep 2019 09:04), Max: 38.8 (23 Sep 2019 23:51)  T(F): 98.5 (24 Sep 2019 09:04), Max: 101.8 (23 Sep 2019 23:51)  HR: 73 (24 Sep 2019 09:04) (70 - 102)  BP: 128/78 (24 Sep 2019 09:04) (114/65 - 157/50)  RR: 18 (24 Sep 2019 09:04) (16 - 18)  SpO2: 96% (24 Sep 2019 09:04) (92% - 98%)  Height (cm): 165.1 (09-23 @ 20:47)  Weight (kg): 87.3 (09-23 @ 20:47)  BMI (kg/m2): 32 (09-23 @ 20:47)  BSA (m2): 1.95 (09-23 @ 20:47)  GEN: NAD  HEENT: normocephalic and atraumatic. EOMI. PERRL.    NECK: Supple.  No lymphadenopathy   LUNGS: Clear to auscultation. Midsternal chest with healing wound, midpart with minimal opening and clear discharge  HEART: irregular rate and rhythm 2/6 systolic murmur.  ABDOMEN: Soft, nontender, and nondistended.  Positive bowel sounds.    : No CVA tenderness  EXTREMITIES: left arm nontender AVF site, R chest permacath nontender.   PSYCHIATRIC: Appropriate affect .  SKIN: as above     Labs:  09-24    134<L>  |  96<L>  |  32.0<H>  ----------------------------<  117<H>  4.2   |  29.0  |  4.26<H>    Ca    8.0<L>      24 Sep 2019 06:01  Mg     1.9     09-24    TPro  7.4  /  Alb  3.9  /  TBili  0.5  /  DBili  x   /  AST  14  /  ALT  14  /  AlkPhos  93  09-23                        8.8    15.80 )-----------( 329      ( 24 Sep 2019 06:01 )             28.4     PT/INR - ( 23 Sep 2019 14:42 )   PT: 13.1 sec;   INR: 1.13 ratio    PTT - ( 23 Sep 2019 14:42 )  PTT:27.4 sec    LIVER FUNCTIONS - ( 23 Sep 2019 14:42 )  Alb: 3.9 g/dL / Pro: 7.4 g/dL / ALK PHOS: 93 U/L / ALT: 14 U/L / AST: 14 U/L / GGT: x           RECENT CULTURES:  09-23 @ 13:56 Skin     Few Coag Negative Staphylococcus    All imaging and other data have been reviewed.    Assessment and Plan:     58y/o man with PMH of DM2, HTN, stroke, ESRD on HD trough R chest TDC,  s/p AVF placement on 8/26 in lef arm, CAD s/p CABG on 9/13, was admitted with mid sternal wound discharge, fever, nausea and vomiting.    Wound infection  Fever  Leukocytosis  ESRD   DM2    Per ID :   - Blood culture x 2   - Wound culture looks like CoNS, most likely skin dilma  - WBC 15k, trend it   - Chest CT to see if there is any collection or deep infection.   - Left fistula and  TDC - look fine for now.   - Start Vancomycin 1gm after HD  - Start zosyn 3.375gm q12h   - based on culture results will switch antibiotics. ( ESRD Dosing )     HD in AM,
[facility] DIVISION OF KIDNEY DISEASES AND HYPERTENSION -- FOLLOW UP NOTE  --------------------------------------------------------------------------------  Chief Complaint:  ESRD on HD    24 hour events/subjective:  Pt seen and examined   NAD  HD yesterday      PAST HISTORY  --------------------------------------------------------------------------------  No significant changes to PMH, PSH, FHx, SHx, unless otherwise noted    ALLERGIES & MEDICATIONS  --------------------------------------------------------------------------------  Allergies    lactose (Flatulence (Severe); Diarrhea (Severe); Stomach Upset (Severe); Nausea (Severe))  No Known Drug Allergies    Intolerances    OHS (Unknown)    Standing Inpatient Medications  aspirin enteric coated 325 milliGRAM(s) Oral daily  atorvastatin 40 milliGRAM(s) Oral at bedtime  calcium carbonate   1250 mG (OsCal) 1 Tablet(s) Oral two times a day  clopidogrel Tablet 75 milliGRAM(s) Oral daily  dextrose 5%. 1000 milliLiter(s) IV Continuous <Continuous>  dextrose 50% Injectable 12.5 Gram(s) IV Push once  dextrose 50% Injectable 25 Gram(s) IV Push once  dextrose 50% Injectable 25 Gram(s) IV Push once  docusate sodium 100 milliGRAM(s) Oral three times a day  insulin glargine Injectable (LANTUS) 10 Unit(s) SubCutaneous at bedtime  insulin lispro (HumaLOG) corrective regimen sliding scale   SubCutaneous Before meals and at bedtime  metoprolol tartrate 25 milliGRAM(s) Oral three times a day  piperacillin/tazobactam IVPB.. 3.375 Gram(s) IV Intermittent every 12 hours  povidone iodine 10% Solution 1 Application(s) Topical three times a day  sodium chloride 0.9% lock flush 3 milliLiter(s) IV Push every 8 hours    PRN Inpatient Medications  acetaminophen   Tablet .. 650 milliGRAM(s) Oral every 6 hours PRN  dextrose 40% Gel 15 Gram(s) Oral once PRN  glucagon  Injectable 1 milliGRAM(s) IntraMuscular once PRN  oxyCODONE    5 mG/acetaminophen 325 mG 1 Tablet(s) Oral every 4 hours PRN      REVIEW OF SYSTEMS  --------------------------------------------------------------------------------  Gen: No weight changes, fatigue, fevers/chills, weakness  Skin: No rashes  Head/Eyes/Ears/Mouth: No headache; Normal hearing; Normal vision w/o blurriness; No sinus pain/discomfort, sore throat  Respiratory: No dyspnea, cough, wheezing, hemoptysis  CV: No chest pain, PND, orthopnea  GI: No abdominal pain, diarrhea, constipation, nausea, vomiting, melena, hematochezia  : No increased frequency, dysuria, hematuria, nocturia  MSK: No joint pain/swelling; no back pain; no edema  Neuro: No dizziness/lightheadedness, weakness, seizures, numbness, tingling  Heme: No easy bruising or bleeding  Endo: No heat/cold intolerance  Psych: No significant nervousness, anxiety, stress, depression    All other systems were reviewed and are negative, except as noted.    VITALS/PHYSICAL EXAM  --------------------------------------------------------------------------------  T(C): 37 (09-26-19 @ 05:36), Max: 37.6 (09-25-19 @ 20:33)  HR: 75 (09-26-19 @ 05:36) (71 - 85)  BP: 108/52 (09-26-19 @ 05:36) (108/52 - 156/57)  RR: 15 (09-26-19 @ 05:36) (15 - 18)  SpO2: 94% (09-25-19 @ 19:15) (94% - 94%)  Wt(kg): --        09-25-19 @ 07:01  -  09-26-19 @ 07:00  --------------------------------------------------------  IN: 650 mL / OUT: 2125 mL / NET: -1475 mL    09-26-19 @ 07:01  -  09-26-19 @ 11:21  --------------------------------------------------------  IN: 240 mL / OUT: 0 mL / NET: 240 mL      Physical Exam:  	Gen: NAD, well-appearing  	HEENT: PERRL, supple neck, clear oropharynx  	Pulm: CTA B/L  	CV: RRR, S1S2; no rub  	Back: No spinal or CVA tenderness; no sacral edema  	Abd: +BS, soft, nontender/nondistended  	: No suprapubic tenderness  	UE: Warm, FROM, no clubbing, intact strength; no edema; no asterixis  	LE: Warm, FROM, no clubbing, intact strength; no edema  	Neuro: No focal deficits, intact gait  	Psych: Normal affect and mood  	Skin: Warm, without rashes  	Vascular access:    LABS/STUDIES  --------------------------------------------------------------------------------              8.8    9.85  >-----------<  332      [09-26-19 @ 05:55]              28.4     138  |  99  |  28.0  ----------------------------<  97      [09-26-19 @ 05:55]  4.9   |  25.0  |  3.56        Ca     8.4     [09-26-19 @ 05:55]      Mg     1.9     [09-26-19 @ 05:55]      Phos  3.2     [09-26-19 @ 05:55]    TPro  6.6  /  Alb  2.9  /  TBili  0.6  /  DBili  x   /  AST  25  /  ALT  16  /  AlkPhos  73  [09-26-19 @ 05:55]          Creatinine Trend:  SCr 3.56 [09-26 @ 05:55]  SCr 5.18 [09-25 @ 06:24]  SCr 4.26 [09-24 @ 06:01]  SCr 6.21 [09-23 @ 14:42]  SCr 4.20 [09-18 @ 06:14]        Iron 37, TIBC 204, %sat 18      [08-20-19 @ 15:20]  Ferritin 305      [08-20-19 @ 15:20]  PTH -- (Ca 6.9)      [08-20-19 @ 23:33]   456  Vitamin D (25OH) 11.3      [08-21-19 @ 00:00]  HbA1c 8.4      [08-20-19 @ 13:09]  TSH 1.97      [08-20-19 @ 13:09]  Lipid: chol 209, , HDL 32,       [08-20-19 @ 13:09]
[facility]  INFECTIOUS DISEASES AND INTERNAL MEDICINE at [place]  =======================================================  Vazquez Jean MD  Diplomates American Board of Internal Medicine and Infectious Diseases  =======================================================    MRN-[id number]  KATELYN WALKER     Follow up: Sternal wound drainage     No more nausea or vomiting.   Minimal lower sternal wound discharge. No pain or swelling.     PAST MEDICAL & SURGICAL HISTORY:  S/P arteriovenous (AV) fistula creation  S/P CABG x 3  HLD (hyperlipidemia)  Cerebrovascular accident (CVA): Poor historian, unaware of date  DM2 (diabetes mellitus, type 2)  HTN (hypertension)  S/P CABG x 3    Social Hx: Exsmoker    FAMILY HISTORY:  FH: HTN (hypertension)  FH: CAD (coronary artery disease)    Allergies  lactose (Flatulence (Severe); Diarrhea (Severe); Stomach Upset (Severe); Nausea (Severe))  No Known Drug Allergies    Antibiotics:  zosyn  vancomycin      REVIEW OF SYSTEMS:  CONSTITUTIONAL:  No Fever or chills  HEENT:  No diplopia or blurred vision.  No sore throat or runny nose.  CARDIOVASCULAR:  No chest pain or SOB.  RESPIRATORY:  No cough, shortness of breath, PND or orthopnea.  GASTROINTESTINAL:  No nausea, vomiting or diarrhea.  GENITOURINARY:  No dysuria, frequency or urgency. No Blood in urine  MUSCULOSKELETAL:  no joint aches, no muscle pain  SKIN:  No change in skin, hair or nails.  NEUROLOGIC:  No paresthesias, fasciculations, seizures or weakness.  PSYCHIATRIC:  No disorder of thought or mood.  ENDOCRINE:  No heat or cold intolerance, polyuria or polydipsia.  HEMATOLOGICAL:  No easy bruising or bleeding.     Physical Exam:  Vital Signs Last 24 Hrs  T(C): 36.7 (25 Sep 2019 08:52), Max: 37.2 (24 Sep 2019 21:22)  T(F): 98 (25 Sep 2019 08:52), Max: 98.9 (24 Sep 2019 21:22)  HR: 80 (25 Sep 2019 08:52) (73 - 80)  BP: 112/72 (25 Sep 2019 08:52) (112/72 - 131/77)  BP(mean): --  RR: 18 (25 Sep 2019 08:52) (16 - 18)  SpO2: 96% (25 Sep 2019 08:52) (95% - 96%)  GEN: NAD  HEENT: normocephalic and atraumatic. EOMI. PERRL.    NECK: Supple.  No lymphadenopathy   LUNGS: Clear to auscultation. Midsternal chest with healing wound, midpart with minimal opening and clear discharge  HEART: irregular rate and rhythm 2/6 systolic murmur.  ABDOMEN: Soft, nontender, and nondistended.  Positive bowel sounds.    : No CVA tenderness  EXTREMITIES: left arm nontender AVF site, R chest permacath nontender.   PSYCHIATRIC: Appropriate affect .  SKIN: as above     Labs:  09-25    134<L>  |  93<L>  |  42.0<H>  ----------------------------<  90  4.2   |  28.0  |  5.18<H>    Ca    7.9<L>      25 Sep 2019 06:24  Phos  3.8     09-25  Mg     1.9     09-25    TPro  5.9<L>  /  Alb  2.9<L>  /  TBili  0.6  /  DBili  x   /  AST  15  /  ALT  16  /  AlkPhos  73  09-25                        8.1    12.31 )-----------( 332      ( 25 Sep 2019 06:24 )             25.9     PT/INR - ( 23 Sep 2019 14:42 )   PT: 13.1 sec;   INR: 1.13 ratio    PTT - ( 23 Sep 2019 14:42 )  PTT:27.4 sec    LIVER FUNCTIONS - ( 25 Sep 2019 06:24 )  Alb: 2.9 g/dL / Pro: 5.9 g/dL / ALK PHOS: 73 U/L / ALT: 16 U/L / AST: 15 U/L / GGT: x           RECENT CULTURES:  09-23 @ 13:56 Skin     Few Coag Negative Staphylococcus    All imaging and other data have been reviewed.    Assessment and Plan:   58y/o man with PMH of DM2, HTN, stroke, ESRD on HD trough R chest permacath s/p AVF placement on 8/26 in lef arm, CAD s/p CABG on 9/13, was admitted with mid sternal wound discharge, fever, nausea and vomiting.    Wound infection  Fever  Leukocytosis  ESRD   DM2    - Blood culture penidng   - Wound culture looks like CoNS, most likely skin dilma  - WBC 15k-->12k, trend it   - Chest CT to rule out collection or deep infection in sternal area.   - No sign of infection in Left fistula and permacath   - Continue Vancomycin 1gm after HD  - Continue zosyn 3.375gm q12h   - based on culture results will switch antibiotics.     Will follow.
Subjective: "I feel ok" nad seated in chair. Denies cp, sob    VITAL SIGNS  Vital Signs Last 24 Hrs  T(C): 37 (19 @ 05:36), Max: 37.6 (19 @ 20:33)  T(F): 98.6 (19 @ 05:36), Max: 99.7 (19 @ 20:33)  HR: 75 (19 @ 05:36) (71 - 85)  BP: 108/52 (19 @ 05:36) (108/52 - 156/57)  RR: 15 (19 @ 05:36) (15 - 18)  SpO2: 94% (19 @ 19:15) (94% - 94%)  on (O2)              Telemetry/Alarms:  SR 70s, pvcs      MEDICATIONS  acetaminophen   Tablet .. 650 milliGRAM(s) Oral every 6 hours PRN  aspirin enteric coated 325 milliGRAM(s) Oral daily  atorvastatin 40 milliGRAM(s) Oral at bedtime  calcium carbonate   1250 mG (OsCal) 1 Tablet(s) Oral two times a day  clopidogrel Tablet 75 milliGRAM(s) Oral daily  dextrose 40% Gel 15 Gram(s) Oral once PRN  dextrose 5%. 1000 milliLiter(s) IV Continuous <Continuous>  dextrose 50% Injectable 12.5 Gram(s) IV Push once  dextrose 50% Injectable 25 Gram(s) IV Push once  dextrose 50% Injectable 25 Gram(s) IV Push once  docusate sodium 100 milliGRAM(s) Oral three times a day  glucagon  Injectable 1 milliGRAM(s) IntraMuscular once PRN  insulin glargine Injectable (LANTUS) 10 Unit(s) SubCutaneous at bedtime  insulin lispro (HumaLOG) corrective regimen sliding scale   SubCutaneous Before meals and at bedtime  metoprolol tartrate 25 milliGRAM(s) Oral three times a day  oxyCODONE    5 mG/acetaminophen 325 mG 1 Tablet(s) Oral every 4 hours PRN  piperacillin/tazobactam IVPB.. 3.375 Gram(s) IV Intermittent every 12 hours  povidone iodine 10% Solution 1 Application(s) Topical three times a day  sodium chloride 0.9% lock flush 3 milliLiter(s) IV Push every 8 hours      PHYSICAL EXAM  General: well nourished, well developed, no acute distress  Neurology: alert and oriented x 3, nonfocal, no gross deficits  Respiratory: clear to auscultation bilaterally  CV: regular rate and rhythm, normal S1, S2  Abdomen: soft, nontender, nondistended, positive bowel sounds,  Extremities: warm, well perfused. no edema. + DP pulses  Incisions: midline sternal incision, dry dressing over lower pole with only scant drainage. skin intact, msternum stable.       @ 07:  -   @ 07:00  --------------------------------------------------------  IN: 650 mL / OUT: 2125 mL / NET: -1475 mL     @ 07: @ 11:24  --------------------------------------------------------  IN: 240 mL / OUT: 0 mL / NET: 240 mL        Weights:  Daily     Daily Weight in k.3 (26 Sep 2019 05:51)  Admit Wt: Drug Dosing Weight  Height (cm): 165.1 (23 Sep 2019 20:47)  Weight (kg): 87.3 (23 Sep 2019 20:47)  BMI (kg/m2): 32 (23 Sep 2019 20:47)  BSA (m2): 1.95 (23 Sep 2019 20:47)    All laboratory results, radiology and medications reviewed.    LABS      138  |  99  |  28.0<H>  ----------------------------<  97  4.9   |  25.0  |  3.56<H>    Ca    8.4<L>      26 Sep 2019 05:55  Phos  3.2       Mg     1.9         TPro  6.6  /  Alb  2.9<L>  /  TBili  0.6  /  DBili  x   /  AST  25  /  ALT  16  /  AlkPhos  73                                   8.8    9.85  )-----------( 332      ( 26 Sep 2019 05:55 )             28.4            Bilirubin Total, Serum: 0.6 mg/dL ( @ 05:55)    CAPILLARY BLOOD GLUCOSE      POCT Blood Glucose.: 105 mg/dL (26 Sep 2019 08:12)  POCT Blood Glucose.: 124 mg/dL (25 Sep 2019 21:45)  POCT Blood Glucose.: 117 mg/dL (25 Sep 2019 20:57)  POCT Blood Glucose.: 85 mg/dL (25 Sep 2019 16:58)  POCT Blood Glucose.: 132 mg/dL (25 Sep 2019 11:45)           Today's CXR: < from: Xray Chest 1 View- PORTABLE-Routine (19 @ 07:05) >    Findings:  No change in lines and tubes. Poststernotomy changes. The lungs are clear   without evidence of pleural effusion or pneumothorax..    Impression:  Stable exam without significant change since the previous study..    < end of copied text >      Today's EKG:   < from: 12 Lead ECG (19 @ 07:26) >    Diagnosis Line Normal sinus rhythm  Possible Left atrial enlargement  Nonspecific ST abnormality  Leftward axis    < end of copied text >    PAST MEDICAL & SURGICAL HISTORY:  S/P arteriovenous (AV) fistula creation  S/P CABG x 3  HLD (hyperlipidemia)  Cerebrovascular accident (CVA): Poor historian, unaware of date  DM2 (diabetes mellitus, type 2)  HTN (hypertension)  S/P CABG x 3
Subjective: Pt sitting up in chair eating breakfast.  Denies CP or SOB.  NAD noted.       Tele:    SR                      T(F): 98.5 (19 @ 09:04), Max: 101.8 (19 @ 23:51)  HR: 73 (19 @ 09:04) (70 - 102)  BP: 128/78 (19 @ 09:04) (114/65 - 157/50)  RR: 18 (19 @ 09:04) (16 - 18)  SpO2: 96% (19 @ 09:04) (92% - 98%) on room air at rest.    Weight (kg): 87.3 ( @ 20:47)    Daily Height in cm: 165.1 (23 Sep 2019 20:47)    Daily Weight in k.2 (24 Sep 2019 04:55)    LV EF: 35-40%    lactose (Flatulence (Severe); Diarrhea (Severe); Stomach Upset (Severe); Nausea (Severe))  No Known Drug Allergies  OHS (Unknown)          134<L>  |  96<L>  |  32.0<H>  ----------------------------<  117<H>  4.2   |  29.0  |  4.26<H>    Ca    8.0<L>      24 Sep 2019 06:01  Mg     1.9         TPro  7.4  /  Alb  3.9  /  TBili  0.5  /  DBili  x   /  AST  14  /  ALT  14  /  AlkPhos  93                                 8.8    15.80 )-----------( 329      ( 24 Sep 2019 06:01 )             28.4        PT/INR - ( 23 Sep 2019 14:42 )   PT: 13.1 sec;   INR: 1.13 ratio         PTT - ( 23 Sep 2019 14:42 )  PTT:27.4 sec         CAPILLARY BLOOD GLUCOSE  POCT Blood Glucose.: 127 mg/dL (24 Sep 2019 08:05)  POCT Blood Glucose.: 157 mg/dL (24 Sep 2019 00:29)  POCT Blood Glucose.: 190 mg/dL (23 Sep 2019 21:11)           CXR: < from: Xray Chest 1 View- PORTABLE-Routine (19 @ 05:55) >     EXAM:  XR CHEST PORTABLE ROUTINE 1V                          PROCEDURE DATE:  2019          INTERPRETATION:  Portable chest radiograph       CLINICAL INFORMATION:  Postoperative  Cardiac surgery.    TECHNIQUE:  Portable  AP view of the chestwas obtained.    FINDINGS:   No previous examinations are available for review.    There is an indwelling tunneled double lumen catheter with distal tip in   the SVC.  The lungs  show ill-defined LEFT asthmatic contour which may indicate a   adjacentLEFT lung base and opacification/infiltrate. Remaining lung   parenchyma clear.    The heart and mediastinum are within normal limits following cardiac    surgery.     Visualized osseous structures are intact.      IMPRESSION:    Status post open heart surgery.   Possible LEFT lower lobe infiltrate.   Catheters in place.      FELICE DONG M.D., ATTENDING RADIOLOGIST  This document has been electronically signed. Sep 24 2019  8:52AM    < end of copied text >      I&O's Detail    23 Sep 2019 07:  -  24 Sep 2019 07:00  --------------------------------------------------------  IN:    Oral Fluid: 60 mL    Solution: 500 mL  Total IN: 560 mL    OUT:    Other: 1000 mL    Voided: 400 mL  Total OUT: 1400 mL    Total NET: -840 mL      24 Sep 2019 07:  -  24 Sep 2019 11:15  --------------------------------------------------------  IN:    Oral Fluid: 120 mL    Solution: 50 mL  Total IN: 170 mL    OUT:  Total OUT: 0 mL    Total NET: 170 mL        Active Medications:  acetaminophen   Tablet .. 650 milliGRAM(s) Oral every 6 hours PRN  aspirin enteric coated 325 milliGRAM(s) Oral daily  atorvastatin 40 milliGRAM(s) Oral at bedtime  calcium carbonate   1250 mG (OsCal) 1 Tablet(s) Oral two times a day  clopidogrel Tablet 75 milliGRAM(s) Oral daily  dextrose 40% Gel 15 Gram(s) Oral once PRN  dextrose 5%. 1000 milliLiter(s) IV Continuous <Continuous>  dextrose 50% Injectable 12.5 Gram(s) IV Push once  dextrose 50% Injectable 25 Gram(s) IV Push once  dextrose 50% Injectable 25 Gram(s) IV Push once  docusate sodium 100 milliGRAM(s) Oral three times a day  glucagon  Injectable 1 milliGRAM(s) IntraMuscular once PRN  insulin glargine Injectable (LANTUS) 10 Unit(s) SubCutaneous at bedtime  insulin lispro (HumaLOG) corrective regimen sliding scale   SubCutaneous Before meals and at bedtime  metoprolol tartrate 25 milliGRAM(s) Oral three times a day  oxyCODONE    5 mG/acetaminophen 325 mG 1 Tablet(s) Oral every 4 hours PRN  sodium chloride 0.9% lock flush 3 milliLiter(s) IV Push every 8 hours      Physical Exam:    Neuro: AAOX3.  Non focal.    Pulm: Diminished BLL.     CV: RRR.  +S1+S2.    Abd: Soft/NT/ND.  +BS.      Extremities: No edema.  +pp.    Incision(s): MSI PHILIP.  Lower pole with purulent drainage.  Sternum stable.                     PAST MEDICAL & SURGICAL HISTORY:  S/P arteriovenous (AV) fistula creation  S/P CABG x 3  HLD (hyperlipidemia)  Cerebrovascular accident (CVA): Poor historian, unaware of date  DM2 (diabetes mellitus, type 2)  HTN (hypertension)  S/P CABG x 3
Subjective: Pt sitting up in chair.  Denies CP or SOB.  NAD noted.      Tele: SR                         T(F): 98 (19 @ 08:52), Max: 98.9 (19 @ 21:22)  HR: 80 (19 @ 08:52) (73 - 80)  BP: 112/72 (19 @ 08:52) (112/72 - 131/77)  RR: 18 (19 @ 08:52) (16 - 18)  SpO2: 96% (19 @ 08:52) (95% - 96%) on room air at rest.    Daily     Daily Weight in k.6 (25 Sep 2019 06:16)    LV EF: 40%    No Known Drug Allergies          134<L>  |  93<L>  |  42.0<H>  ----------------------------<  90  4.2   |  28.0  |  5.18<H>    Ca    7.9<L>      25 Sep 2019 06:24  Phos  3.8       Mg     1.9         TPro  5.9<L>  /  Alb  2.9<L>  /  TBili  0.6  /  DBili  x   /  AST  15  /  ALT  16  /  AlkPhos  73                                 8.1    12.31 )-----------( 332      ( 25 Sep 2019 06:24 )             25.9        PT/INR - ( 23 Sep 2019 14:42 )   PT: 13.1 sec;   INR: 1.13 ratio         PTT - ( 23 Sep 2019 14:42 )  PTT:27.4 sec         CAPILLARY BLOOD GLUCOSE  POCT Blood Glucose.: 110 mg/dL (25 Sep 2019 08:11)  POCT Blood Glucose.: 174 mg/dL (24 Sep 2019 21:12)  POCT Blood Glucose.: 116 mg/dL (24 Sep 2019 17:16)  POCT Blood Glucose.: 160 mg/dL (24 Sep 2019 12:10)           CXR: < from: Xray Chest 1 View- PORTABLE-Routine (19 @ 07:05) >  EXAM:  XR CHEST PORTABLE ROUTINE 1V                          PROCEDURE DATE:  2019        INTERPRETATION:  CHEST AP PORTABLE:    History: ohs.     Date and time of exam: 2019 5:33 AM.    Technique: A single AP view of the chest was obtained.    Comparison exam: 2019 4:12 AM.    Findings:  No change in lines and tubes. Poststernotomy changes. The lungs are clear   without evidence of pleural effusion or pneumothorax..    Impression:  Stable exam without significant change since the previous study.    GEE LIND M.D., ATTENDING RADIOLOGIST  This document has been electronically signed. Sep 25 2019  8:23AM    < end of copied text >      I&O's Detail    24 Sep 2019 07:  -  25 Sep 2019 07:00  --------------------------------------------------------  IN:    Oral Fluid: 720 mL    Solution: 150 mL    Solution: 50 mL    Solution: 50 mL  Total IN: 970 mL    OUT:    Voided: 1075 mL  Total OUT: 1075 mL    Total NET: -105 mL      25 Sep 2019 07:  -  25 Sep 2019 10:13  --------------------------------------------------------  IN:    Oral Fluid: 240 mL  Total IN: 240 mL    OUT:  Total OUT: 0 mL    Total NET: 240 mL       Active Medications:  acetaminophen   Tablet .. 650 milliGRAM(s) Oral every 6 hours PRN  aspirin enteric coated 325 milliGRAM(s) Oral daily  atorvastatin 40 milliGRAM(s) Oral at bedtime  calcium carbonate   1250 mG (OsCal) 1 Tablet(s) Oral two times a day  clopidogrel Tablet 75 milliGRAM(s) Oral daily  dextrose 40% Gel 15 Gram(s) Oral once PRN  dextrose 5%. 1000 milliLiter(s) IV Continuous <Continuous>  dextrose 50% Injectable 12.5 Gram(s) IV Push once  dextrose 50% Injectable 25 Gram(s) IV Push once  dextrose 50% Injectable 25 Gram(s) IV Push once  docusate sodium 100 milliGRAM(s) Oral three times a day  glucagon  Injectable 1 milliGRAM(s) IntraMuscular once PRN  insulin glargine Injectable (LANTUS) 10 Unit(s) SubCutaneous at bedtime  insulin lispro (HumaLOG) corrective regimen sliding scale   SubCutaneous Before meals and at bedtime  metoprolol tartrate 25 milliGRAM(s) Oral three times a day  oxyCODONE    5 mG/acetaminophen 325 mG 1 Tablet(s) Oral every 4 hours PRN  piperacillin/tazobactam IVPB.. 3.375 Gram(s) IV Intermittent every 12 hours  povidone iodine 10% Solution 1 Application(s) Topical three times a day  sodium chloride 0.9% lock flush 3 milliLiter(s) IV Push every 8 hours      Physical Exam:    Neuro: AAOX3.  Non focal .     Pulm: Diminished BLL.    CV: RRR.  +S1+S2.    Abd: Soft/NT/ND.  +BS.     Extremities: No edema.  +pp.      Incision(s):   Lower pole of incision with purulent drainage.  Sternum stable.                    PAST MEDICAL & SURGICAL HISTORY:  S/P arteriovenous (AV) fistula creation  S/P CABG x 3  HLD (hyperlipidemia)  Cerebrovascular accident (CVA): Poor historian, unaware of date  DM2 (diabetes mellitus, type 2)  HTN (hypertension)  S/P CABG x 3
[facility]  INFECTIOUS DISEASES AND INTERNAL MEDICINE at [place]  =======================================================  Vazquez Jean MD  Diplomates American Board of Internal Medicine and Infectious Diseases  =======================================================    [id number]  KATELYN WALKER     Follow up: Sternal wound drainage     No nausea or vomiting.   Minimal lower sternal wound discharge.   No pain or swelling.   Chest CT with no collection or OM.     PAST MEDICAL & SURGICAL HISTORY:  S/P arteriovenous (AV) fistula creation  S/P CABG x 3  HLD (hyperlipidemia)  Cerebrovascular accident (CVA): Poor historian, unaware of date  DM2 (diabetes mellitus, type 2)  HTN (hypertension)  S/P CABG x 3    Social Hx: Exsmoker    FAMILY HISTORY:  FH: HTN (hypertension)  FH: CAD (coronary artery disease)    Allergies  lactose (Flatulence (Severe); Diarrhea (Severe); Stomach Upset (Severe); Nausea (Severe))  No Known Drug Allergies    Antibiotics:  zosyn  vancomycin      REVIEW OF SYSTEMS:  CONSTITUTIONAL:  No Fever or chills  HEENT:  No diplopia or blurred vision.  No sore throat or runny nose.  CARDIOVASCULAR:  No chest pain or SOB.  RESPIRATORY:  No cough, shortness of breath, PND or orthopnea.  GASTROINTESTINAL:  No nausea, vomiting or diarrhea.  GENITOURINARY:  No dysuria, frequency or urgency. No Blood in urine  MUSCULOSKELETAL:  no joint aches, no muscle pain  SKIN:  No change in skin, hair or nails.  NEUROLOGIC:  No paresthesias, fasciculations, seizures or weakness.  PSYCHIATRIC:  No disorder of thought or mood.  ENDOCRINE:  No heat or cold intolerance, polyuria or polydipsia.  HEMATOLOGICAL:  No easy bruising or bleeding.     Physical Exam:  Vital Signs Last 24 Hrs  T(C): 37 (26 Sep 2019 05:36), Max: 37.6 (25 Sep 2019 20:33)  T(F): 98.6 (26 Sep 2019 05:36), Max: 99.7 (25 Sep 2019 20:33)  HR: 75 (26 Sep 2019 05:36) (71 - 85)  BP: 108/52 (26 Sep 2019 05:36) (108/52 - 156/57)  BP(mean): --  RR: 15 (26 Sep 2019 05:36) (15 - 18)  SpO2: 94% (25 Sep 2019 19:15) (94% - 94%)  GEN: NAD  HEENT: normocephalic and atraumatic. EOMI. PERRL.    NECK: Supple.  No lymphadenopathy   LUNGS: Clear to auscultation. Midsternal chest with healing wound, midpart with minimal opening and clear discharge  HEART: irregular rate and rhythm 2/6 systolic murmur.  ABDOMEN: Soft, nontender, and nondistended.  Positive bowel sounds.    : No CVA tenderness  EXTREMITIES: left arm nontender AVF site, R chest permacath nontender.   PSYCHIATRIC: Appropriate affect .  SKIN: as above     Labs:  09-26    138  |  99  |  28.0<H>  ----------------------------<  97  4.9   |  25.0  |  3.56<H>    Ca    8.4<L>      26 Sep 2019 05:55  Phos  3.2     09-26  Mg     1.9     09-26    TPro  6.6  /  Alb  2.9<L>  /  TBili  0.6  /  DBili  x   /  AST  25  /  ALT  16  /  AlkPhos  73  09-26                        8.8    9.85  )-----------( 332      ( 26 Sep 2019 05:55 )             28.4     LIVER FUNCTIONS - ( 26 Sep 2019 05:55 )  Alb: 2.9 g/dL / Pro: 6.6 g/dL / ALK PHOS: 73 U/L / ALT: 16 U/L / AST: 25 U/L / GGT: x           RECENT CULTURES:  09-23 @ 15:45 .Blood     No growth at 48 hours    09-23 @ 14:42 .Blood     No growth at 48 hours    09-23 @ 13:56 Skin     Few Coag Negative Staphylococcus    All imaging and other data have been reviewed.    Assessment and Plan:   58y/o man with PMH of DM2, HTN, stroke, ESRD on HD trough R chest permacath s/p AVF placement on 8/26 in lef arm, CAD s/p CABG on 9/13, was admitted with mid sternal wound discharge, fever, nausea and vomiting.    Wound infection  Fever  Leukocytosis  ESRD   DM2    - Blood culture Negative   - Wound culture looks like CoNS, most likely skin dilma  - WBC 15k-->9k, trend it   - Chest CT with no collection or deep infection in sternal area.   - No sign of infection in Left fistula and permacath   - Can switch to oral antibiotics   - Doxycycline 100mg q12h and Augmentin 500gm daily total 10days.   - Follow up with ID next week.     Will sign off please call with any question.
minimum assist (75% patients effort)

## 2023-03-08 NOTE — PATIENT PROFILE ADULT - NSPROGENSOURCEINFO_GEN_A_NUR
patient  1-calculated by average/Not able to assess (calculate score using [facility] averaging method)

## 2023-10-30 ENCOUNTER — APPOINTMENT (OUTPATIENT)
Dept: OPHTHALMOLOGY | Facility: CLINIC | Age: 63
End: 2023-10-30
Payer: MEDICARE

## 2023-10-30 ENCOUNTER — NON-APPOINTMENT (OUTPATIENT)
Age: 63
End: 2023-10-30

## 2023-10-30 PROCEDURE — 92004 COMPRE OPH EXAM NEW PT 1/>: CPT

## 2023-11-10 ENCOUNTER — NON-APPOINTMENT (OUTPATIENT)
Age: 63
End: 2023-11-10

## 2023-11-10 ENCOUNTER — APPOINTMENT (OUTPATIENT)
Dept: OPHTHALMOLOGY | Facility: CLINIC | Age: 63
End: 2023-11-10
Payer: MEDICARE

## 2023-11-10 PROCEDURE — 92014 COMPRE OPH EXAM EST PT 1/>: CPT

## 2023-11-10 PROCEDURE — 92136 OPHTHALMIC BIOMETRY: CPT

## 2023-11-27 ENCOUNTER — APPOINTMENT (OUTPATIENT)
Dept: CARDIOLOGY | Facility: CLINIC | Age: 63
End: 2023-11-27
Payer: MEDICARE

## 2023-11-27 ENCOUNTER — NON-APPOINTMENT (OUTPATIENT)
Age: 63
End: 2023-11-27

## 2023-11-27 VITALS
OXYGEN SATURATION: 100 % | DIASTOLIC BLOOD PRESSURE: 64 MMHG | HEART RATE: 66 BPM | SYSTOLIC BLOOD PRESSURE: 112 MMHG | HEIGHT: 64 IN | BODY MASS INDEX: 36.19 KG/M2 | WEIGHT: 212 LBS

## 2023-11-27 PROCEDURE — 99215 OFFICE O/P EST HI 40 MIN: CPT

## 2023-11-27 RX ORDER — CALCIUM ACETATE 667 MG
667 TABLET ORAL 3 TIMES DAILY
Refills: 0 | Status: DISCONTINUED | COMMUNITY
End: 2023-11-27

## 2023-11-27 RX ORDER — CILOSTAZOL 100 MG/1
100 TABLET ORAL TWICE DAILY
Qty: 180 | Refills: 0 | Status: DISCONTINUED | COMMUNITY
Start: 2019-11-07 | End: 2023-11-27

## 2023-11-27 RX ORDER — CHOLECALCIFEROL (VITAMIN D3) 1250 MCG
1.25 MG CAPSULE ORAL DAILY
Refills: 0 | Status: ACTIVE | COMMUNITY

## 2023-11-27 RX ORDER — LOSARTAN POTASSIUM 100 MG/1
100 TABLET, FILM COATED ORAL DAILY
Qty: 90 | Refills: 3 | Status: DISCONTINUED | COMMUNITY
Start: 2019-10-10 | End: 2023-11-27

## 2023-11-27 RX ORDER — FERROUS SULFATE TAB EC 325 MG (65 MG FE EQUIVALENT) 325 (65 FE) MG
325 (65 FE) TABLET DELAYED RESPONSE ORAL 3 TIMES DAILY
Refills: 0 | Status: DISCONTINUED | COMMUNITY
End: 2023-11-27

## 2023-11-27 RX ORDER — METOPROLOL SUCCINATE 100 MG/1
100 TABLET, EXTENDED RELEASE ORAL
Qty: 90 | Refills: 1 | Status: DISCONTINUED | COMMUNITY
Start: 2019-10-10 | End: 2023-11-27

## 2023-11-27 RX ORDER — AMLODIPINE BESYLATE 10 MG/1
10 TABLET ORAL DAILY
Refills: 0 | Status: ACTIVE | COMMUNITY

## 2023-11-27 RX ORDER — FOLIC ACID 1 MG/1
1 TABLET ORAL DAILY
Qty: 90 | Refills: 3 | Status: DISCONTINUED | COMMUNITY
End: 2023-11-27

## 2023-11-27 RX ORDER — CLOPIDOGREL BISULFATE 75 MG/1
75 TABLET, FILM COATED ORAL DAILY
Qty: 90 | Refills: 1 | Status: DISCONTINUED | COMMUNITY
End: 2023-11-27

## 2023-11-29 ENCOUNTER — NON-APPOINTMENT (OUTPATIENT)
Age: 63
End: 2023-11-29

## 2023-11-29 ENCOUNTER — APPOINTMENT (OUTPATIENT)
Dept: OPHTHALMOLOGY | Facility: HOSPITAL | Age: 63
End: 2023-11-29
Payer: MEDICARE

## 2023-11-29 PROCEDURE — 66984 XCAPSL CTRC RMVL W/O ECP: CPT | Mod: LT

## 2023-11-30 ENCOUNTER — NON-APPOINTMENT (OUTPATIENT)
Age: 63
End: 2023-11-30

## 2023-11-30 ENCOUNTER — APPOINTMENT (OUTPATIENT)
Dept: OPHTHALMOLOGY | Facility: CLINIC | Age: 63
End: 2023-11-30
Payer: MEDICARE

## 2023-11-30 PROCEDURE — 99024 POSTOP FOLLOW-UP VISIT: CPT

## 2023-12-08 ENCOUNTER — NON-APPOINTMENT (OUTPATIENT)
Age: 63
End: 2023-12-08

## 2023-12-08 ENCOUNTER — APPOINTMENT (OUTPATIENT)
Dept: OPHTHALMOLOGY | Facility: CLINIC | Age: 63
End: 2023-12-08
Payer: MEDICARE

## 2023-12-08 PROCEDURE — 99024 POSTOP FOLLOW-UP VISIT: CPT

## 2023-12-20 ENCOUNTER — APPOINTMENT (OUTPATIENT)
Dept: CARDIOLOGY | Facility: CLINIC | Age: 63
End: 2023-12-20

## 2023-12-20 ENCOUNTER — APPOINTMENT (OUTPATIENT)
Dept: CARDIOLOGY | Facility: CLINIC | Age: 63
End: 2023-12-20
Payer: MEDICARE

## 2023-12-20 VITALS
SYSTOLIC BLOOD PRESSURE: 140 MMHG | OXYGEN SATURATION: 97 % | HEIGHT: 64 IN | RESPIRATION RATE: 14 BRPM | HEART RATE: 70 BPM | DIASTOLIC BLOOD PRESSURE: 64 MMHG

## 2023-12-20 VITALS — WEIGHT: 212 LBS | BODY MASS INDEX: 36.39 KG/M2

## 2023-12-20 DIAGNOSIS — Z87.891 PERSONAL HISTORY OF NICOTINE DEPENDENCE: ICD-10-CM

## 2023-12-20 DIAGNOSIS — I73.9 PERIPHERAL VASCULAR DISEASE, UNSPECIFIED: ICD-10-CM

## 2023-12-20 DIAGNOSIS — I50.22 CHRONIC SYSTOLIC (CONGESTIVE) HEART FAILURE: ICD-10-CM

## 2023-12-20 DIAGNOSIS — Z79.899 OTHER LONG TERM (CURRENT) DRUG THERAPY: ICD-10-CM

## 2023-12-20 DIAGNOSIS — Z01.810 ENCOUNTER FOR PREPROCEDURAL CARDIOVASCULAR EXAMINATION: ICD-10-CM

## 2023-12-20 DIAGNOSIS — I25.10 ATHEROSCLEROTIC HEART DISEASE OF NATIVE CORONARY ARTERY W/OUT ANGINA PECTORIS: ICD-10-CM

## 2023-12-20 DIAGNOSIS — I35.1 NONRHEUMATIC AORTIC (VALVE) INSUFFICIENCY: ICD-10-CM

## 2023-12-20 DIAGNOSIS — N18.9 CHRONIC KIDNEY DISEASE, UNSPECIFIED: ICD-10-CM

## 2023-12-20 DIAGNOSIS — Z78.9 OTHER SPECIFIED HEALTH STATUS: ICD-10-CM

## 2023-12-20 DIAGNOSIS — I34.0 NONRHEUMATIC MITRAL (VALVE) INSUFFICIENCY: ICD-10-CM

## 2023-12-20 DIAGNOSIS — I10 ESSENTIAL (PRIMARY) HYPERTENSION: ICD-10-CM

## 2023-12-20 DIAGNOSIS — Z83.3 FAMILY HISTORY OF DIABETES MELLITUS: ICD-10-CM

## 2023-12-20 DIAGNOSIS — Z82.49 FAMILY HISTORY OF ISCHEMIC HEART DISEASE AND OTHER DISEASES OF THE CIRCULATORY SYSTEM: ICD-10-CM

## 2023-12-20 DIAGNOSIS — Z86.39 PERSONAL HISTORY OF OTHER ENDOCRINE, NUTRITIONAL AND METABOLIC DISEASE: ICD-10-CM

## 2023-12-20 DIAGNOSIS — Z87.448 PERSONAL HISTORY OF OTHER DISEASES OF URINARY SYSTEM: ICD-10-CM

## 2023-12-20 DIAGNOSIS — Z86.69 PERSONAL HISTORY OF OTHER DISEASES OF THE NERVOUS SYSTEM AND SENSE ORGANS: ICD-10-CM

## 2023-12-20 DIAGNOSIS — Z86.73 PERSONAL HISTORY OF TRANSIENT ISCHEMIC ATTACK (TIA), AND CEREBRAL INFARCTION W/OUT RESIDUAL DEFICITS: ICD-10-CM

## 2023-12-20 DIAGNOSIS — Z99.2 DEPENDENCE ON RENAL DIALYSIS: ICD-10-CM

## 2023-12-20 PROCEDURE — 99214 OFFICE O/P EST MOD 30 MIN: CPT

## 2023-12-20 RX ORDER — CARVEDILOL 25 MG/1
25 TABLET, FILM COATED ORAL TWICE DAILY
Refills: 0 | Status: DISCONTINUED | COMMUNITY
End: 2023-12-20

## 2023-12-29 NOTE — REASON FOR VISIT
Mary Hart from [facility] called. He needed the pt's last visit note for her Haja refill. I tried to do it on [place], but she no longer qualifies. She's no longer on insulin or any other dm meds since her bariatric surgery. She also has no record of any hypoglycemia. I did leave a detailed message for the pt. [de-identified] : CABG x 3 (LIMA_LAD, SVG-  [de-identified] : 09/13/19

## 2024-01-04 PROBLEM — N18.9 CHRONIC KIDNEY DISEASE: Status: ACTIVE | Noted: 2019-10-10

## 2024-01-04 PROBLEM — Z87.448 HISTORY OF RENAL FAILURE: Status: RESOLVED | Noted: 2019-10-09 | Resolved: 2024-01-04

## 2024-01-04 PROBLEM — I50.22 CHRONIC SYSTOLIC HF (HEART FAILURE): Status: ACTIVE | Noted: 2020-02-04

## 2024-01-04 PROBLEM — I73.9 PERIPHERAL ARTERIAL DISEASE: Status: ACTIVE | Noted: 2020-10-22

## 2024-01-04 PROBLEM — Z78.9 CAFFEINE USE: Status: ACTIVE | Noted: 2019-10-10

## 2024-01-04 PROBLEM — I25.10 CORONARY ARTERY DISEASE: Status: ACTIVE | Noted: 2019-10-09

## 2024-01-04 PROBLEM — Z83.3 FAMILY HISTORY OF TYPE 2 DIABETES MELLITUS: Status: ACTIVE | Noted: 2019-11-20

## 2024-01-04 PROBLEM — I73.9 CLAUDICATION: Status: ACTIVE | Noted: 2019-10-10

## 2024-01-04 PROBLEM — Z87.891 FORMER SMOKER: Status: ACTIVE | Noted: 2019-10-09

## 2024-01-04 PROBLEM — I10 BENIGN ESSENTIAL HYPERTENSION: Status: RESOLVED | Noted: 2019-10-09 | Resolved: 2024-01-04

## 2024-01-04 PROBLEM — Z79.899 HIGH RISK MEDICATION USE: Status: ACTIVE | Noted: 2020-02-04

## 2024-01-04 PROBLEM — Z86.39 HISTORY OF HYPERLIPIDEMIA: Status: RESOLVED | Noted: 2019-10-09 | Resolved: 2024-01-04

## 2024-01-04 PROBLEM — I35.1 AORTIC VALVE INSUFFICIENCY, ACQUIRED: Status: ACTIVE | Noted: 2020-10-22

## 2024-01-04 PROBLEM — Z86.73 HISTORY OF CEREBROVASCULAR ACCIDENT: Status: RESOLVED | Noted: 2019-10-09 | Resolved: 2024-01-04

## 2024-01-04 PROBLEM — Z86.69 HISTORY OF SCIATICA: Status: RESOLVED | Noted: 2019-10-09 | Resolved: 2024-01-04

## 2024-01-04 PROBLEM — Z99.2 HEMODIALYSIS PATIENT: Status: ACTIVE | Noted: 2019-10-10

## 2024-01-04 PROBLEM — I34.0 NON-RHEUMATIC MITRAL REGURGITATION: Status: ACTIVE | Noted: 2020-10-22

## 2024-01-04 PROBLEM — Z82.49 FAMILY HISTORY OF HYPERTENSION: Status: ACTIVE | Noted: 2019-11-20

## 2024-01-04 PROBLEM — Z01.810 PRE-OPERATIVE CARDIOVASCULAR EXAMINATION: Status: ACTIVE | Noted: 2023-11-27

## 2024-01-04 NOTE — CARDIOLOGY SUMMARY
[LVEF ___%] : LVEF [unfilled]% [___] : [unfilled] [de-identified] : 11/27/23: NSR, HR 68, incomplete LBBB, LAFB, poor R wave progression.   [de-identified] : Echo 2/16/23: LVEF 55-60% , mild MR.

## 2024-01-04 NOTE — REVIEW OF SYSTEMS
[Negative] : Heme/Lymph [FreeTextEntry1] : 10 point review of systems was negative except for that listed in the HPI.

## 2024-01-04 NOTE — HISTORY OF PRESENT ILLNESS
[FreeTextEntry1] : 63 year old male with ESRD on HD, CAD s/p CABG x 3 (LIMA-->LAD, SVG-->PDA, SVG-->OM on 9/13/2019), PAD (follows with Dr. Stanley and s/p intervention on left lower extremity, pending right lower extremity intervention, HTN, HLD presents today for preoperative evaluation of left-sided cataract surgery.  Today, he denies chest pain, palpitations, dyspnea at rest or exertion, orthopnea, leg swelling, changes in appetite, changes in weight, bendopnea, lightheadedness, dizziness, and syncope/pre-syncope.  At baseline he walks with a cane but is able to walk up > 1 flights of stairs without symptoms.

## 2024-01-04 NOTE — DISCUSSION/SUMMARY
[FreeTextEntry1] : # Pre op evaluation  - left cataract surgery on 11/29. - he is optimized from a cardiac standpoint for low risk cataract surgery.  No further preoperative testing.   - EKG today stable with incomplete LBBB and LAFB similar to prior. - He currently does not endorse any cardiac symptoms.     # Hx of HFrEF/Coronary Artery Disease - Ischemic cardiomyopathy.  Last echocardiogram in 2/20/2023 showed preserved LV function.  - Patient revascularized with CABG and placed on medical therapy.  -Continue on beta-blockers with Coreg 25 mg twice daily.  No longer on losartan per renal team.  #  Hypertension: appears controlled continue current medical therapy.  #  PAD/Claudication: ABIs performed on 11/26/2019 demonstrated severe bilateral lower extremity peripheral arterial disease. No longer on DAPT just ASA.  Off of cilostazol. Following with vascular specialist Dr. Stanley per patient had intervention performed.  # HLD: on atorvastatin 40mg daily. Goal LDL is less than 70.   # Aortic Valve Insufficiency/Mitral Valve Regurgitation: mild for both on echocardiogram from 2/2023. Periodic echo surveillance.   Preserved LVEF not a candidate for ICD or CRT at this time. Stable post cataract surgery. Appears euvolemic.   I spent 35 minutes with the patient including: 10 minutes preparing for the visit 15 minutes face to face. 10 minutes on documentation and coordination of care.

## 2024-01-04 NOTE — ASSESSMENT
[FreeTextEntry1] : 63-year-old male with ESRD on HD, CAD s/p CABG x 3 (LIMA-->LAD, SVG-->PDA, SVG-->OM on 9/13/2019), PAD (follows with Dr. Stanley and s/p intervention on left lower extremity, pending right lower extremity intervention, HTN, HLD presented in November for pre-op clearance.

## 2024-01-04 NOTE — PHYSICAL EXAM
[No Oral Cyanosis] : no oral cyanosis [FreeTextEntry1] : ambulates slowly with cane [Normal] : soft, non-tender, no masses/organomegaly, normal bowel sounds [No Edema] : no edema [No Rash] : no rash [Moves all extremities] : moves all extremities [Alert and Oriented] : alert and oriented [de-identified] : Left upper arm AVF

## 2024-01-19 ENCOUNTER — NON-APPOINTMENT (OUTPATIENT)
Age: 64
End: 2024-01-19

## 2024-01-19 ENCOUNTER — APPOINTMENT (OUTPATIENT)
Dept: OPHTHALMOLOGY | Facility: CLINIC | Age: 64
End: 2024-01-19
Payer: MEDICARE

## 2024-01-19 PROCEDURE — 99024 POSTOP FOLLOW-UP VISIT: CPT

## 2024-02-08 NOTE — PATIENT PROFILE ADULT - NSTOBACCOCESSATIONEDU2_GEN_A_NUR
Thank you for choosing Dr. Barcenas at [facility]. It's a pleasure to be a part of your healthcare team. Please let us know if you need anything---the nursing staff can be reached at [phone]. Have a great day!     You may receive a short survey from [facility] about this visit. We appreciate your feedback.    Your Healthcare Providers,  Dr. Dipti Marie MA, CLARISSA Estrada, Jeannie, RN, and Lisa RN    Develop coping skills.  For example, strategies and lifestyle changes that reduce negative moods, stress, and exposure to smoking cues.

## 2024-02-21 ENCOUNTER — APPOINTMENT (OUTPATIENT)
Dept: CARDIOLOGY | Facility: CLINIC | Age: 64
End: 2024-02-21
Payer: MEDICARE

## 2024-02-21 VITALS
DIASTOLIC BLOOD PRESSURE: 72 MMHG | HEART RATE: 82 BPM | SYSTOLIC BLOOD PRESSURE: 150 MMHG | HEIGHT: 64 IN | OXYGEN SATURATION: 96 %

## 2024-02-21 VITALS — BODY MASS INDEX: 35.87 KG/M2 | WEIGHT: 209 LBS

## 2024-02-21 PROCEDURE — 99214 OFFICE O/P EST MOD 30 MIN: CPT

## 2024-02-21 RX ORDER — LOSARTAN POTASSIUM 100 MG/1
100 TABLET, FILM COATED ORAL
Refills: 0 | Status: ACTIVE | COMMUNITY

## 2024-02-21 NOTE — CARDIOLOGY SUMMARY
[de-identified] : Echo: 2020, mild MR, mild AI. LVEF 55-60%.   Cardiac Cath: 2019, triple vessel disease   CAB2019, LIMA-->LAD, SVG-->PDA, SVG-->OM   EC23: NSR, HR 68, incomplete LBBB, LAFB, poor R wave progression.   Stress Test: Echo 23: LVEF 55-60% , mild MR.

## 2024-02-21 NOTE — HISTORY OF PRESENT ILLNESS
[FreeTextEntry1] :  63 year old with ESRD on HD, CAD s/p 3vCABG (LIMA-LAD, SVG-PDA, SVG-OM, 2019), PAD s/p LLE intervention (Dr. Rosario), HTN (labile with HD), and HLD, who presents for routine cardiovascular follow-up.   Patient was recently evaluated prior to left-sided cataract surgery. At that time, and at present, patient has been doing well and has not had issues with chest discomfort, shortness of breath, orthopnea, leg swelling, or palpitations. He remains at his usual activity level with a cane and enjoys playing with his nephew. He is able to fulfill all his ADLs and iADLs.   Of note, prior CV documentation suggests that losartan was to be discontinued in favor of carvedilol   -- this has not occurred in reality. He remains on losartan 100 mg daily and has not taken the carvedilol. His nephrologist is handling hypertensive regimen, which is appropriate given the lability his BP faces with HD. Should another agent be needed in the future for HTN, carvedilol could be a consideration.   Patient requires no refills at this time.

## 2024-02-21 NOTE — DISCUSSION/SUMMARY
[FreeTextEntry1] :  63 year old gentleman with ESRD on HD (T,Th,Sa), CAD s/p 3vCABG (2019), PAD s/p LLE intervention (Dr. Rosario), HTN with post HD lability, and HLD, who is doing well. No changes to medications at this time -- elevated BP is noted; however, per discussion with patient, aggressive BP control has led to post-HD hypotension in the past. We agreed that it would be best for his nephrologist to continue managing HTN, though we would be happy to assist in any way if needed.   # HTN  - Amlodipine 10 mg daily  - Losartan 100 mg daily   # CAD  - ASA 81 mg daily   - Atorvastatin 40 mg daily  # ESRD on HD  - Continue T, Th, Sa  - If ongoing post -HD hypotension remains a problem, consider a twice a day BP regimen that can be held on mornings of dialysis.    # PAD  - Per prior documentation, RLE intervention pending; patient continues to follow-up with vascular provider.  RTC: 6 months with primary cardiologist, or sooner if needed.   Appreciate the opportunity to participate in the care of Mr. STANLEY. Strict ER precautions were provided to patient should symptoms worsen, or new ones present. Please do not hesitate to reach out with any questions, concerns, or changes in clinical status.   Michael Ba MD, [facility] Interventional Cardiology

## 2024-03-15 ENCOUNTER — APPOINTMENT (OUTPATIENT)
Dept: ENDOCRINOLOGY | Facility: CLINIC | Age: 64
End: 2024-03-15
Payer: MEDICARE

## 2024-03-15 VITALS
SYSTOLIC BLOOD PRESSURE: 140 MMHG | DIASTOLIC BLOOD PRESSURE: 86 MMHG | HEART RATE: 83 BPM | WEIGHT: 206 LBS | OXYGEN SATURATION: 95 % | HEIGHT: 63 IN | BODY MASS INDEX: 36.5 KG/M2

## 2024-03-15 DIAGNOSIS — E55.9 VITAMIN D DEFICIENCY, UNSPECIFIED: ICD-10-CM

## 2024-03-15 DIAGNOSIS — E11.9 TYPE 2 DIABETES MELLITUS W/OUT COMPLICATIONS: ICD-10-CM

## 2024-03-15 LAB — GLUCOSE BLDC GLUCOMTR-MCNC: 177

## 2024-03-15 PROCEDURE — 99204 OFFICE O/P NEW MOD 45 MIN: CPT

## 2024-03-15 NOTE — ASSESSMENT
[FreeTextEntry1] :  1- T2DM c/b ESRD, CAD and CVA Plan:  - Prescription for glucometer was sent to pt's pharmacy. He was asked to check FBG fasting, before main meal and before bedtime and bring the log book to next appointment.  - He will drop by recent labs at office and he will confirm his Nateglinide dose. BMP and A1C ordered to be done.  - Refused DM diet education consult.  - Follow up with Podiatry and ophthalmology.  - F/U in in 1-2 month   2- Vit D deficiency On Vit D 50,000 once a week  - Will check Vit D level

## 2024-03-15 NOTE — HISTORY OF PRESENT ILLNESS
[FreeTextEntry1] : 63 year old man with h/o T2DM, CAD- s/p triple bypass, CVA, ESRD (on HD Tues/Thurs/Sat), CHF, HTN, HLP presented to establish care.   Pt states he had recent labs but he forgot to bring them to today's appointment. He will drop them by later today.   - Diabetes  Diagnosed with T2DM about 10-12 years ago. States his nephrologist was managing his diabetes.  Doesn't check FBG. When asked about yhe reason he states "it seems to be alright". Reports he never had symptoms of hypoglycemia. Home regimen only includes Nateglinide ? dose BID. Reports he was never placed on insulin.  Eats 3 meals a day. Breakfast and/or sometimes dinner are main meals.  Reports numbness of Lt hand and both feet.   Follows up with podiatry.  Follows up with ophthalmology- ? unaware of DR He is retired.   - HLD  Atorvastatin 40 mg   - Vit D deficiency  Vit D 50,000 IU once a week

## 2024-03-18 RX ORDER — BLOOD-GLUCOSE METER
W/DEVICE KIT MISCELLANEOUS
Qty: 1 | Refills: 0 | Status: DISCONTINUED | COMMUNITY
Start: 2024-03-15 | End: 2024-03-18

## 2024-03-18 RX ORDER — BLOOD SUGAR DIAGNOSTIC
STRIP MISCELLANEOUS DAILY
Qty: 1 | Refills: 1 | Status: ACTIVE | COMMUNITY
Start: 2024-03-18 | End: 1900-01-01

## 2024-03-18 RX ORDER — BLOOD SUGAR DIAGNOSTIC
STRIP MISCELLANEOUS DAILY
Qty: 100 | Refills: 1 | Status: DISCONTINUED | COMMUNITY
Start: 2024-03-18 | End: 2024-03-18

## 2024-03-18 RX ORDER — LANCETS 28 GAUGE
EACH MISCELLANEOUS
Qty: 1 | Refills: 1 | Status: DISCONTINUED | COMMUNITY
Start: 2024-03-15 | End: 2024-03-18

## 2024-03-18 RX ORDER — LANCETS 30 GAUGE
EACH MISCELLANEOUS
Qty: 1 | Refills: 1 | Status: ACTIVE | COMMUNITY
Start: 2024-03-18 | End: 1900-01-01

## 2024-03-18 RX ORDER — LANCETS 28 GAUGE
EACH MISCELLANEOUS
Qty: 100 | Refills: 1 | Status: DISCONTINUED | COMMUNITY
Start: 2024-03-18 | End: 2024-03-18

## 2024-03-18 RX ORDER — BLOOD-GLUCOSE METER
W/DEVICE EACH MISCELLANEOUS
Qty: 1 | Refills: 0 | Status: ACTIVE | COMMUNITY
Start: 2024-03-18 | End: 1900-01-01

## 2024-03-18 RX ORDER — BLOOD SUGAR DIAGNOSTIC
STRIP MISCELLANEOUS 4 TIMES DAILY
Qty: 100 | Refills: 0 | Status: DISCONTINUED | COMMUNITY
Start: 2024-03-15 | End: 2024-03-18

## 2024-05-17 ENCOUNTER — APPOINTMENT (OUTPATIENT)
Dept: ENDOCRINOLOGY | Facility: CLINIC | Age: 64
End: 2024-05-17

## 2024-07-19 ENCOUNTER — APPOINTMENT (OUTPATIENT)
Dept: OPHTHALMOLOGY | Facility: CLINIC | Age: 64
End: 2024-07-19

## 2024-08-26 ENCOUNTER — APPOINTMENT (OUTPATIENT)
Dept: CARDIOLOGY | Facility: CLINIC | Age: 64
End: 2024-08-26

## 2024-08-26 DIAGNOSIS — Z99.2 DEPENDENCE ON RENAL DIALYSIS: ICD-10-CM

## 2024-08-26 DIAGNOSIS — E11.9 TYPE 2 DIABETES MELLITUS W/OUT COMPLICATIONS: ICD-10-CM

## 2024-08-26 DIAGNOSIS — I50.22 CHRONIC SYSTOLIC (CONGESTIVE) HEART FAILURE: ICD-10-CM

## 2024-08-26 DIAGNOSIS — I35.1 NONRHEUMATIC AORTIC (VALVE) INSUFFICIENCY: ICD-10-CM

## 2024-08-26 DIAGNOSIS — I25.10 ATHEROSCLEROTIC HEART DISEASE OF NATIVE CORONARY ARTERY W/OUT ANGINA PECTORIS: ICD-10-CM

## 2024-08-26 DIAGNOSIS — N18.9 CHRONIC KIDNEY DISEASE, UNSPECIFIED: ICD-10-CM

## 2024-08-26 DIAGNOSIS — I73.9 PERIPHERAL VASCULAR DISEASE, UNSPECIFIED: ICD-10-CM

## 2024-08-26 NOTE — REASON FOR VISIT
[FreeTextEntry1] :  Brief Cardiology Update Note  Patient has not arrived for 11:00 AM appointment as of 11:41 AM.   Kindly recommend office staff notify patient that he is welcome to wait for a potential schedule availability or re-schedule his appointment for a later date.    Michael Ba MD, [facility] Interventional & Clinical Cardiology

## 2024-08-26 NOTE — DISCUSSION/SUMMARY
[FreeTextEntry1] :  64 year old gentleman with ESRD on HD, CAD s/p 3vCABG (LIMA-LAD, SVG-PDA, SVG-OM, 2019), PAD s/p LLE intervention (Dr. Rosario), HTN (labile with HD), and HLD, who presents for routine cardiovascular follow-up.  Plan:   -   -   -  [EKG obtained to assist in diagnosis and management of assessed problem(s)] : EKG obtained to assist in diagnosis and management of assessed problem(s)

## 2024-08-26 NOTE — HISTORY OF PRESENT ILLNESS
[FreeTextEntry1] : 63 year old with ESRD on HD, CAD s/p 3vCABG (LIMA-LAD, SVG-PDA, SVG-OM, 2019), PAD s/p LLE intervention (Dr. Rosario), HTN (labile with HD), and HLD, who presents for routine cardiovascular follow-up. Presently doing well, same level of activity level.
